# Patient Record
Sex: MALE | Race: WHITE | NOT HISPANIC OR LATINO | Employment: OTHER | URBAN - METROPOLITAN AREA
[De-identification: names, ages, dates, MRNs, and addresses within clinical notes are randomized per-mention and may not be internally consistent; named-entity substitution may affect disease eponyms.]

---

## 2017-07-12 ENCOUNTER — HISTORICAL (OUTPATIENT)
Dept: ADMINISTRATIVE | Facility: HOSPITAL | Age: 72
End: 2017-07-12

## 2018-03-05 ENCOUNTER — HISTORICAL (OUTPATIENT)
Dept: ADMINISTRATIVE | Facility: HOSPITAL | Age: 73
End: 2018-03-05

## 2018-03-07 LAB — FINAL CULTURE: NO GROWTH

## 2018-07-06 ENCOUNTER — HISTORICAL (OUTPATIENT)
Dept: ADMINISTRATIVE | Facility: HOSPITAL | Age: 73
End: 2018-07-06

## 2019-12-11 ENCOUNTER — HISTORICAL (OUTPATIENT)
Dept: ADMINISTRATIVE | Facility: HOSPITAL | Age: 74
End: 2019-12-11

## 2019-12-17 ENCOUNTER — HISTORICAL (OUTPATIENT)
Dept: ADMINISTRATIVE | Facility: HOSPITAL | Age: 74
End: 2019-12-17

## 2020-01-06 ENCOUNTER — HISTORICAL (OUTPATIENT)
Dept: ADMINISTRATIVE | Facility: HOSPITAL | Age: 75
End: 2020-01-06

## 2020-03-12 ENCOUNTER — HISTORICAL (OUTPATIENT)
Dept: ADMINISTRATIVE | Facility: HOSPITAL | Age: 75
End: 2020-03-12

## 2021-08-02 ENCOUNTER — HISTORICAL (OUTPATIENT)
Dept: ADMINISTRATIVE | Facility: HOSPITAL | Age: 76
End: 2021-08-02

## 2021-09-16 ENCOUNTER — HISTORICAL (OUTPATIENT)
Dept: ADMINISTRATIVE | Facility: HOSPITAL | Age: 76
End: 2021-09-16

## 2021-11-12 LAB — CRC RECOMMENDATION EXT: NORMAL

## 2022-01-13 ENCOUNTER — HISTORICAL (OUTPATIENT)
Dept: ADMINISTRATIVE | Facility: HOSPITAL | Age: 77
End: 2022-01-13

## 2022-05-18 ENCOUNTER — OFFICE VISIT (OUTPATIENT)
Dept: URGENT CARE | Facility: CLINIC | Age: 77
End: 2022-05-18
Payer: MEDICARE

## 2022-05-18 VITALS
HEART RATE: 76 BPM | TEMPERATURE: 99 F | OXYGEN SATURATION: 96 % | DIASTOLIC BLOOD PRESSURE: 85 MMHG | BODY MASS INDEX: 19.61 KG/M2 | RESPIRATION RATE: 18 BRPM | WEIGHT: 122 LBS | SYSTOLIC BLOOD PRESSURE: 161 MMHG | HEIGHT: 66 IN

## 2022-05-18 DIAGNOSIS — H61.21 IMPACTED CERUMEN OF RIGHT EAR: Primary | ICD-10-CM

## 2022-05-18 PROCEDURE — 1160F PR REVIEW ALL MEDS BY PRESCRIBER/CLIN PHARMACIST DOCUMENTED: ICD-10-PCS | Mod: CPTII,,, | Performed by: FAMILY MEDICINE

## 2022-05-18 PROCEDURE — 1159F PR MEDICATION LIST DOCUMENTED IN MEDICAL RECORD: ICD-10-PCS | Mod: CPTII,,, | Performed by: FAMILY MEDICINE

## 2022-05-18 PROCEDURE — 1159F MED LIST DOCD IN RCRD: CPT | Mod: CPTII,,, | Performed by: FAMILY MEDICINE

## 2022-05-18 PROCEDURE — 3077F SYST BP >= 140 MM HG: CPT | Mod: CPTII,,, | Performed by: FAMILY MEDICINE

## 2022-05-18 PROCEDURE — 3077F PR MOST RECENT SYSTOLIC BLOOD PRESSURE >= 140 MM HG: ICD-10-PCS | Mod: CPTII,,, | Performed by: FAMILY MEDICINE

## 2022-05-18 PROCEDURE — 99202 OFFICE O/P NEW SF 15 MIN: CPT | Mod: ,,, | Performed by: FAMILY MEDICINE

## 2022-05-18 PROCEDURE — 3008F BODY MASS INDEX DOCD: CPT | Mod: CPTII,,, | Performed by: FAMILY MEDICINE

## 2022-05-18 PROCEDURE — 1160F RVW MEDS BY RX/DR IN RCRD: CPT | Mod: CPTII,,, | Performed by: FAMILY MEDICINE

## 2022-05-18 PROCEDURE — 3079F DIAST BP 80-89 MM HG: CPT | Mod: CPTII,,, | Performed by: FAMILY MEDICINE

## 2022-05-18 PROCEDURE — 3079F PR MOST RECENT DIASTOLIC BLOOD PRESSURE 80-89 MM HG: ICD-10-PCS | Mod: CPTII,,, | Performed by: FAMILY MEDICINE

## 2022-05-18 PROCEDURE — 3008F PR BODY MASS INDEX (BMI) DOCUMENTED: ICD-10-PCS | Mod: CPTII,,, | Performed by: FAMILY MEDICINE

## 2022-05-18 PROCEDURE — 99202 PR OFFICE/OUTPT VISIT, NEW, LEVL II, 15-29 MIN: ICD-10-PCS | Mod: ,,, | Performed by: FAMILY MEDICINE

## 2022-05-18 RX ORDER — ROSUVASTATIN CALCIUM 5 MG/1
5 TABLET, COATED ORAL NIGHTLY
COMMUNITY
Start: 2022-04-06 | End: 2023-04-06 | Stop reason: SDUPTHER

## 2022-05-18 RX ORDER — HYDROCODONE BITARTRATE AND ACETAMINOPHEN 7.5; 325 MG/1; MG/1
TABLET ORAL
COMMUNITY
End: 2023-06-06

## 2022-05-18 RX ORDER — DOXEPIN HYDROCHLORIDE 10 MG/1
CAPSULE ORAL
COMMUNITY
End: 2022-06-01 | Stop reason: SDUPTHER

## 2022-05-18 RX ORDER — CARVEDILOL 6.25 MG/1
6.25 TABLET ORAL 2 TIMES DAILY
COMMUNITY
Start: 2021-11-29

## 2022-05-18 RX ORDER — PLECANATIDE 3 MG/1
1 TABLET ORAL DAILY
COMMUNITY
Start: 2022-05-17 | End: 2022-08-03

## 2022-05-18 RX ORDER — TRAZODONE HYDROCHLORIDE 150 MG/1
TABLET ORAL
COMMUNITY
End: 2023-04-25 | Stop reason: SDUPTHER

## 2022-05-18 NOTE — PROGRESS NOTES
"Subjective:       Patient ID: Gustavo Lanza is a 76 y.o. male.    Vitals:  height is 5' 6" (1.676 m) and weight is 55.3 kg (122 lb). His temperature is 98.8 °F (37.1 °C). His blood pressure is 161/85 (abnormal) and his pulse is 76. His respiration is 18 and oxygen saturation is 96%.     Chief Complaint: trouble hearing    Concern for ear wax build up to right ear since yesterday.  Associated hearing loss.  Tried flushing at home without success.       HENT: Positive for hearing loss. Negative for ear pain.    Respiratory: Negative for chest tightness and shortness of breath.        Objective:      Physical Exam   HENT:   Ears:   Right Ear: impacted cerumen  Left Ear: impacted cerumen  Abdominal: Normal appearance.   Neurological: He is alert.   Skin: Skin is warm.   Psychiatric: Mood normal.         Assessment:       1. Impacted cerumen of right ear          Plan:         Impacted cerumen of right ear            R canal: Ceruminosis is noted.  Wax is removed by syringing and manual debridement. Tolerated well.           "

## 2022-05-19 ENCOUNTER — LAB VISIT (OUTPATIENT)
Dept: LAB | Facility: HOSPITAL | Age: 77
End: 2022-05-19
Attending: PHYSICIAN ASSISTANT
Payer: MEDICARE

## 2022-05-19 DIAGNOSIS — K59.00 CONSTIPATION, UNSPECIFIED CONSTIPATION TYPE: Primary | ICD-10-CM

## 2022-05-19 DIAGNOSIS — R63.4 LOSS OF WEIGHT: ICD-10-CM

## 2022-05-19 LAB
ALBUMIN SERPL-MCNC: 3.9 GM/DL (ref 3.4–4.8)
ALBUMIN/GLOB SERPL: 1.4 RATIO (ref 1.1–2)
ALP SERPL-CCNC: 70 UNIT/L (ref 40–150)
ALT SERPL-CCNC: 18 UNIT/L (ref 0–55)
AMYLASE SERPL-CCNC: 48 UNIT/L (ref 20–160)
AST SERPL-CCNC: 23 UNIT/L (ref 5–34)
BILIRUBIN DIRECT+TOT PNL SERPL-MCNC: 0.6 MG/DL
BUN SERPL-MCNC: 31.3 MG/DL (ref 8.4–25.7)
CALCIUM SERPL-MCNC: 9.5 MG/DL (ref 8.8–10)
CHLORIDE SERPL-SCNC: 102 MMOL/L (ref 98–107)
CO2 SERPL-SCNC: 28 MMOL/L (ref 23–31)
CREAT SERPL-MCNC: 1.36 MG/DL (ref 0.73–1.18)
CRP SERPL-MCNC: 1.7 MG/L
ERYTHROCYTE [DISTWIDTH] IN BLOOD BY AUTOMATED COUNT: 13.5 % (ref 11.5–17)
FERRITIN SERPL-MCNC: 68.46 NG/ML (ref 21.81–274.66)
FOLATE SERPL-MCNC: 15.3 NG/ML (ref 7–31.4)
GLOBULIN SER-MCNC: 2.8 GM/DL (ref 2.4–3.5)
GLUCOSE SERPL-MCNC: 122 MG/DL (ref 82–115)
HCT VFR BLD AUTO: 38.6 % (ref 42–52)
HGB BLD-MCNC: 13 GM/DL (ref 14–18)
IRON SATN MFR SERPL: 32 % (ref 20–50)
IRON SERPL-MCNC: 96 UG/DL (ref 65–175)
LIPASE SERPL-CCNC: 31 U/L
MCH RBC QN AUTO: 30.7 PG (ref 27–31)
MCHC RBC AUTO-ENTMCNC: 33.7 MG/DL (ref 33–36)
MCV RBC AUTO: 91.3 FL (ref 80–94)
NRBC BLD AUTO-RTO: 0 %
PLATELET # BLD AUTO: 189 X10(3)/MCL (ref 130–400)
PMV BLD AUTO: 10.7 FL (ref 9.4–12.4)
POTASSIUM SERPL-SCNC: 4.3 MMOL/L (ref 3.5–5.1)
PROT SERPL-MCNC: 6.7 GM/DL (ref 5.8–7.6)
RBC # BLD AUTO: 4.23 X10(6)/MCL (ref 4.7–6.1)
SODIUM SERPL-SCNC: 139 MMOL/L (ref 136–145)
T3FREE SERPL-MCNC: 2.48 PG/ML (ref 1.57–3.91)
T4 FREE SERPL-MCNC: 0.91 NG/DL (ref 0.7–1.48)
TIBC SERPL-MCNC: 204 UG/DL (ref 69–240)
TIBC SERPL-MCNC: 300 UG/DL (ref 250–450)
TRANSFERRIN SERPL-MCNC: 273 MG/DL (ref 163–344)
TSH SERPL-ACNC: 1.58 UIU/ML (ref 0.35–4.94)
VIT B12 SERPL-MCNC: 892 PG/ML (ref 213–816)
WBC # SPEC AUTO: 9.2 X10(3)/MCL (ref 4.5–11.5)

## 2022-05-19 PROCEDURE — 87536 HIV-1 QUANT&REVRSE TRNSCRPJ: CPT

## 2022-05-19 PROCEDURE — 83690 ASSAY OF LIPASE: CPT

## 2022-05-19 PROCEDURE — 85027 COMPLETE CBC AUTOMATED: CPT

## 2022-05-19 PROCEDURE — 82607 VITAMIN B-12: CPT

## 2022-05-19 PROCEDURE — 84439 ASSAY OF FREE THYROXINE: CPT

## 2022-05-19 PROCEDURE — 82746 ASSAY OF FOLIC ACID SERUM: CPT

## 2022-05-19 PROCEDURE — 84481 FREE ASSAY (FT-3): CPT

## 2022-05-19 PROCEDURE — 83540 ASSAY OF IRON: CPT

## 2022-05-19 PROCEDURE — 82150 ASSAY OF AMYLASE: CPT

## 2022-05-19 PROCEDURE — 86140 C-REACTIVE PROTEIN: CPT

## 2022-05-19 PROCEDURE — 84443 ASSAY THYROID STIM HORMONE: CPT

## 2022-05-19 PROCEDURE — 86780 TREPONEMA PALLIDUM: CPT

## 2022-05-19 PROCEDURE — 82728 ASSAY OF FERRITIN: CPT

## 2022-05-19 PROCEDURE — 36415 COLL VENOUS BLD VENIPUNCTURE: CPT

## 2022-05-19 PROCEDURE — 80053 COMPREHEN METABOLIC PANEL: CPT

## 2022-05-20 ENCOUNTER — HOSPITAL ENCOUNTER (OUTPATIENT)
Dept: RADIOLOGY | Facility: HOSPITAL | Age: 77
Discharge: HOME | End: 2022-05-20
Attending: PHYSICIAN ASSISTANT
Payer: MEDICARE

## 2022-05-20 DIAGNOSIS — R63.4 LOSS OF WEIGHT: ICD-10-CM

## 2022-05-20 DIAGNOSIS — K59.00 CONSTIPATION, UNSPECIFIED CONSTIPATION TYPE: ICD-10-CM

## 2022-05-20 DIAGNOSIS — K59.00 CONSTIPATION, UNSPECIFIED CONSTIPATION TYPE: Primary | ICD-10-CM

## 2022-05-20 PROCEDURE — 71046 X-RAY EXAM CHEST 2 VIEWS: CPT | Mod: TC

## 2022-05-23 LAB
HIV 2 AB SERPLBLD QL IA.RAPID: NEGATIVE
HIV1 AB SERPLBLD QL IA.RAPID: NEGATIVE

## 2022-05-24 LAB — HIV1 RNA # PLAS NAA DL=20: NORMAL COPIES/ML

## 2022-05-25 LAB — VIT B1 BLD-SCNC: 172 NMOL/L (ref 70–180)

## 2022-06-01 NOTE — TELEPHONE ENCOUNTER
----- Message from Zaid Clark sent at 6/1/2022  1:31 PM CDT -----  .Type:  RX Refill Request    Who Called: Gustavo  Reftoni or New Rx:Refill  RX Name and Strength:Doxepin 10 mg  How is the patient currently taking it? (ex. 1XDay): 1 x day  Is this a 30 day or 90 day RX:  Preferred Pharmacy with phone number: [facility] to [facility]  Local or Mail Order:Local  Ordering Provider: Derirck   Would the patient rather a call back or a response via [facility]?   Best Call Back Number: [phone]  Additional Information: He told me he has called two to three different [facility]'s they had nothing from the office.

## 2022-06-02 RX ORDER — DOXEPIN HYDROCHLORIDE 10 MG/1
CAPSULE ORAL
Qty: 30 CAPSULE | Refills: 5 | Status: SHIPPED | OUTPATIENT
Start: 2022-06-02 | End: 2023-07-05

## 2022-06-27 ENCOUNTER — APPOINTMENT (OUTPATIENT)
Dept: RADIOLOGY | Facility: HOSPITAL | Age: 77
End: 2022-06-27
Attending: INTERNAL MEDICINE
Payer: MEDICARE

## 2022-06-27 ENCOUNTER — HOSPITAL ENCOUNTER (EMERGENCY)
Facility: HOSPITAL | Age: 77
Discharge: HOME | End: 2022-06-28
Attending: INTERNAL MEDICINE
Payer: MEDICARE

## 2022-06-27 DIAGNOSIS — N30.00 ACUTE CYSTITIS WITHOUT HEMATURIA: ICD-10-CM

## 2022-06-27 DIAGNOSIS — R06.02 SHORTNESS OF BREATH: ICD-10-CM

## 2022-06-27 DIAGNOSIS — K59.00 CONSTIPATION, UNSPECIFIED CONSTIPATION TYPE: ICD-10-CM

## 2022-06-27 DIAGNOSIS — J44.1 COPD EXACERBATION: Primary | ICD-10-CM

## 2022-06-27 LAB
ALBUMIN SERPL-MCNC: 3.5 GM/DL (ref 3.4–4.8)
ALBUMIN/GLOB SERPL: 1 RATIO (ref 1.1–2)
ALP SERPL-CCNC: 93 UNIT/L (ref 40–150)
ALT SERPL-CCNC: 19 UNIT/L (ref 0–55)
AMPHET UR QL SCN: NEGATIVE
APPEARANCE UR: CLEAR
APTT PPP: 22.5 SECONDS (ref 23.4–33.9)
AST SERPL-CCNC: 31 UNIT/L (ref 5–34)
BACTERIA #/AREA URNS AUTO: ABNORMAL /HPF
BARBITURATE SCN PRESENT UR: NEGATIVE
BASOPHILS # BLD AUTO: 0.02 X10(3)/MCL (ref 0–0.2)
BASOPHILS NFR BLD AUTO: 0.6 %
BENZODIAZ UR QL SCN: NEGATIVE
BILIRUB UR QL STRIP.AUTO: NEGATIVE MG/DL
BILIRUBIN DIRECT+TOT PNL SERPL-MCNC: 0.5 MG/DL
BNP BLD-MCNC: 821.5 PG/ML
BUN SERPL-MCNC: 23.4 MG/DL (ref 8.4–25.7)
CALCIUM SERPL-MCNC: 9.3 MG/DL (ref 8.8–10)
CANNABINOIDS UR QL SCN: NEGATIVE
CHLORIDE SERPL-SCNC: 105 MMOL/L (ref 98–107)
CK MB SERPL-MCNC: 2 NG/ML
CK SERPL-CCNC: 96 U/L (ref 30–200)
CO2 SERPL-SCNC: 29 MMOL/L (ref 23–31)
COCAINE UR QL SCN: NEGATIVE
COLOR UR AUTO: ABNORMAL
CREAT SERPL-MCNC: 1.5 MG/DL (ref 0.73–1.18)
EOSINOPHIL # BLD AUTO: 0.07 X10(3)/MCL (ref 0–0.9)
EOSINOPHIL NFR BLD AUTO: 2 %
ERYTHROCYTE [DISTWIDTH] IN BLOOD BY AUTOMATED COUNT: 14.1 % (ref 11.5–17)
FLUAV AG UPPER RESP QL IA.RAPID: NOT DETECTED
FLUBV AG UPPER RESP QL IA.RAPID: NOT DETECTED
GLOBULIN SER-MCNC: 3.6 GM/DL (ref 2.4–3.5)
GLUCOSE SERPL-MCNC: 82 MG/DL (ref 82–115)
GLUCOSE UR QL STRIP.AUTO: NEGATIVE MG/DL
HCT VFR BLD AUTO: 42.7 % (ref 42–52)
HGB BLD-MCNC: 14.3 GM/DL (ref 14–18)
IMM GRANULOCYTES # BLD AUTO: 0.03 X10(3)/MCL (ref 0–0.02)
IMM GRANULOCYTES NFR BLD AUTO: 0.9 % (ref 0–0.43)
INR BLD: 1.11 (ref 2–3)
KETONES UR QL STRIP.AUTO: NEGATIVE MG/DL
LEUKOCYTE ESTERASE UR QL STRIP.AUTO: ABNORMAL UNIT/L
LIPASE SERPL-CCNC: 32 U/L
LYMPHOCYTES # BLD AUTO: 0.82 X10(3)/MCL (ref 0.6–4.6)
LYMPHOCYTES NFR BLD AUTO: 23.8 %
MAGNESIUM SERPL-MCNC: 2 MG/DL (ref 1.6–2.6)
MCH RBC QN AUTO: 30.9 PG (ref 27–31)
MCHC RBC AUTO-ENTMCNC: 33.5 MG/DL (ref 33–36)
MCV RBC AUTO: 92.2 FL (ref 80–94)
MDMA UR QL SCN: NEGATIVE
MONOCYTES # BLD AUTO: 0.03 X10(3)/MCL (ref 0.1–1.3)
MONOCYTES NFR BLD AUTO: 0.9 %
NEUTROPHILS # BLD AUTO: 2.5 X10(3)/MCL (ref 2.1–9.2)
NEUTROPHILS NFR BLD AUTO: 71.8 %
NITRITE UR QL STRIP.AUTO: POSITIVE
NRBC BLD AUTO-RTO: 0 %
OPIATES UR QL SCN: NEGATIVE
PCP UR QL: NEGATIVE
PH UR STRIP.AUTO: 6 [PH]
PH UR: 6 [PH] (ref 3–11)
PLATELET # BLD AUTO: 163 X10(3)/MCL (ref 130–400)
PMV BLD AUTO: 10 FL (ref 9.4–12.4)
POTASSIUM SERPL-SCNC: 4.3 MMOL/L (ref 3.5–5.1)
PROT SERPL-MCNC: 7.1 GM/DL (ref 5.8–7.6)
PROT UR QL STRIP.AUTO: NEGATIVE MG/DL
PROTHROMBIN TIME: 14.1 SECONDS (ref 11.7–14.5)
RBC # BLD AUTO: 4.63 X10(6)/MCL (ref 4.7–6.1)
RBC #/AREA URNS AUTO: ABNORMAL /HPF
RBC UR QL AUTO: ABNORMAL UNIT/L
SARS-COV-2 RNA RESP QL NAA+PROBE: NOT DETECTED
SODIUM SERPL-SCNC: 146 MMOL/L (ref 136–145)
SP GR UR STRIP.AUTO: 1.02
SPECIFIC GRAVITY, URINE AUTO (.000) (OHS): 1.02 (ref 1–1.03)
TROPONIN I SERPL-MCNC: 0.02 NG/ML (ref 0–0.04)
UROBILINOGEN UR STRIP-ACNC: 0.2 MG/DL
WBC # SPEC AUTO: 3.4 X10(3)/MCL (ref 4.5–11.5)
WBC #/AREA URNS AUTO: ABNORMAL /HPF

## 2022-06-27 PROCEDURE — 85730 THROMBOPLASTIN TIME PARTIAL: CPT | Performed by: INTERNAL MEDICINE

## 2022-06-27 PROCEDURE — 71045 X-RAY EXAM CHEST 1 VIEW: CPT | Mod: TC

## 2022-06-27 PROCEDURE — 82550 ASSAY OF CK (CPK): CPT | Performed by: INTERNAL MEDICINE

## 2022-06-27 PROCEDURE — 83735 ASSAY OF MAGNESIUM: CPT | Performed by: INTERNAL MEDICINE

## 2022-06-27 PROCEDURE — 87636 SARSCOV2 & INF A&B AMP PRB: CPT | Performed by: INTERNAL MEDICINE

## 2022-06-27 PROCEDURE — 93010 ELECTROCARDIOGRAM REPORT: CPT | Mod: ,,, | Performed by: INTERNAL MEDICINE

## 2022-06-27 PROCEDURE — 84484 ASSAY OF TROPONIN QUANT: CPT | Performed by: INTERNAL MEDICINE

## 2022-06-27 PROCEDURE — 96375 TX/PRO/DX INJ NEW DRUG ADDON: CPT

## 2022-06-27 PROCEDURE — 83690 ASSAY OF LIPASE: CPT | Performed by: INTERNAL MEDICINE

## 2022-06-27 PROCEDURE — 85610 PROTHROMBIN TIME: CPT | Performed by: INTERNAL MEDICINE

## 2022-06-27 PROCEDURE — 99285 EMERGENCY DEPT VISIT HI MDM: CPT | Mod: 25,CS

## 2022-06-27 PROCEDURE — 82553 CREATINE MB FRACTION: CPT | Performed by: INTERNAL MEDICINE

## 2022-06-27 PROCEDURE — 80307 DRUG TEST PRSMV CHEM ANLYZR: CPT | Performed by: INTERNAL MEDICINE

## 2022-06-27 PROCEDURE — 83880 ASSAY OF NATRIURETIC PEPTIDE: CPT | Performed by: INTERNAL MEDICINE

## 2022-06-27 PROCEDURE — 93005 ELECTROCARDIOGRAM TRACING: CPT

## 2022-06-27 PROCEDURE — 80053 COMPREHEN METABOLIC PANEL: CPT | Performed by: INTERNAL MEDICINE

## 2022-06-27 PROCEDURE — 85025 COMPLETE CBC W/AUTO DIFF WBC: CPT | Performed by: INTERNAL MEDICINE

## 2022-06-27 PROCEDURE — 93010 EKG 12-LEAD: ICD-10-PCS | Mod: ,,, | Performed by: INTERNAL MEDICINE

## 2022-06-27 PROCEDURE — 36415 COLL VENOUS BLD VENIPUNCTURE: CPT | Performed by: INTERNAL MEDICINE

## 2022-06-27 PROCEDURE — 25000003 PHARM REV CODE 250: Performed by: INTERNAL MEDICINE

## 2022-06-27 PROCEDURE — 81001 URINALYSIS AUTO W/SCOPE: CPT | Performed by: INTERNAL MEDICINE

## 2022-06-27 PROCEDURE — 96365 THER/PROPH/DIAG IV INF INIT: CPT

## 2022-06-27 RX ORDER — AZITHROMYCIN 250 MG/1
250 TABLET, FILM COATED ORAL DAILY
Qty: 6 TABLET | Refills: 0 | Status: SHIPPED | OUTPATIENT
Start: 2022-06-27 | End: 2022-08-03

## 2022-06-27 RX ORDER — GUAIFENESIN/DEXTROMETHORPHAN 100-10MG/5
5 SYRUP ORAL EVERY 6 HOURS PRN
Qty: 180 ML | Refills: 0 | Status: SHIPPED | OUTPATIENT
Start: 2022-06-27 | End: 2022-08-03

## 2022-06-27 RX ORDER — PREDNISONE 20 MG/1
20 TABLET ORAL DAILY
Qty: 5 TABLET | Refills: 0 | Status: SHIPPED | OUTPATIENT
Start: 2022-06-27 | End: 2022-07-02

## 2022-06-27 RX ORDER — LACTULOSE 10 G/15ML
10 SOLUTION ORAL 3 TIMES DAILY
Qty: 473 ML | Refills: 0 | Status: SHIPPED | OUTPATIENT
Start: 2022-06-27 | End: 2022-08-03

## 2022-06-27 RX ORDER — CLONIDINE HYDROCHLORIDE 0.1 MG/1
0.1 TABLET ORAL
Status: COMPLETED | OUTPATIENT
Start: 2022-06-27 | End: 2022-06-27

## 2022-06-27 RX ORDER — METHYLPREDNISOLONE SOD SUCC 125 MG
125 VIAL (EA) INJECTION ONCE
Status: COMPLETED | OUTPATIENT
Start: 2022-06-27 | End: 2022-06-28

## 2022-06-27 RX ORDER — CLONIDINE HYDROCHLORIDE 0.1 MG/1
0.2 TABLET ORAL ONCE
Status: DISCONTINUED | OUTPATIENT
Start: 2022-06-27 | End: 2022-06-28 | Stop reason: HOSPADM

## 2022-06-27 RX ADMIN — CLONIDINE HYDROCHLORIDE 0.1 MG: 0.1 TABLET ORAL at 10:06

## 2022-06-28 VITALS
DIASTOLIC BLOOD PRESSURE: 71 MMHG | WEIGHT: 129.19 LBS | SYSTOLIC BLOOD PRESSURE: 124 MMHG | BODY MASS INDEX: 20.76 KG/M2 | HEART RATE: 84 BPM | HEIGHT: 66 IN | OXYGEN SATURATION: 92 % | RESPIRATION RATE: 24 BRPM

## 2022-06-28 PROCEDURE — 63600175 PHARM REV CODE 636 W HCPCS: Performed by: INTERNAL MEDICINE

## 2022-06-28 PROCEDURE — 25000003 PHARM REV CODE 250: Performed by: INTERNAL MEDICINE

## 2022-06-28 RX ADMIN — CEFTRIAXONE SODIUM 1 G: 1 INJECTION, POWDER, FOR SOLUTION INTRAMUSCULAR; INTRAVENOUS at 12:06

## 2022-06-28 RX ADMIN — METHYLPREDNISOLONE SODIUM SUCCINATE 125 MG: 125 INJECTION, POWDER, FOR SOLUTION INTRAMUSCULAR; INTRAVENOUS at 12:06

## 2022-06-28 NOTE — ED NOTES
Bed: 04  Expected date:   Expected time:   Means of arrival:   Comments:  76 M SOB Chest Tightness / Status 1 Re-route

## 2022-06-28 NOTE — ED PROVIDER NOTES
Source of History:  Patient, no limitations    Chief complaint:  Shortness of Breath and Chest Pain      HPI:  Gustavo Lanza is a 76 y.o. male presenting with Shortness of Breath and Chest Pain       Complains of Shortness of breath, chest pain, dysuria, and constipation. Shortness of breath and chest pain were abrupt and woke him up from sleep, history of COPD, symptoms have since improved and no longer feeling the chest pain or the shortness of breath. Painful urination that abruptly started a few days ago. Also says he is using enemas for his constipation that started about 6 months ago.    The patient complains of dyspnea. Onset of symptoms was abrupt starting a few hours ago. Symptom course has been constant, while severity at onset was severe and now is mild. Symptoms tend to occur while at rest. Symptoms are aggravated by dust, pollen, mold, alleviated by rest and oxygen and have been associated with chest pain with breathing. Care prior to arrival consisted of rest and oxygen, with moderate relief.         Review of Systems   Constitutional symptoms:  Subjective chills  Skin symptoms:  Negative except as documented in HPI.   HEENT symptoms:  Negative except as documented in HPI.   Respiratory symptoms:  Cough and shortness of breath  Cardiovascular symptoms:  Negative except as documented in HPI.   Gastrointestinal symptoms:  constipation   Genitourinary symptoms:  dysuria  Musculoskeletal symptoms:  Negative except as documented in HPI.   Neurologic symptoms:  Negative except as documented in HPI.   Psychiatric symptoms:  Negative except as documented in HPI.   Allergy/immunologic symptoms:  Negative except as documented in HPI.             Additional review of systems information: All other systems reviewed and otherwise negative.      Review of patient's allergies indicates:  No Known Allergies    PMH:  As per HPI and below:    Past Medical History:   Diagnosis Date    COPD (chronic  "obstructive pulmonary disease)         Family History   Problem Relation Age of Onset    No Known Problems Mother     No Known Problems Father     No Known Problems Sister     No Known Problems Brother        Past Surgical History:   Procedure Laterality Date    CARDIAC SURGERY      REPAIR OF HEART VALVE         Social History     Tobacco Use    Smoking status: Light Tobacco Smoker    Smokeless tobacco: Current User   Substance Use Topics    Alcohol use: Never    Drug use: Never       There is no problem list on file for this patient.       Physical Exam:    /68   Pulse 89   Resp (!) 31   Ht 5' 6" (1.676 m)   Wt 58.6 kg (129 lb 3 oz)   SpO2 (!) 93%   BMI 20.85 kg/m²     Nursing note and vital signs reviewed.    General:  Alert, frail appearing  Skin: Normal for Ethnic Origin, No cyanosis  HEENT: Normocephalic and atraumatic, Vision unchanged, Pupils symmetric, No icterus , Nasal mucosa is pink and moist  Cardiovascular:  Regular rate and rhythm, No edema  Chest Wall: No deformity, equal chest rise  Respiratory:  Decreased breath sounds, mild wheezing, respirations are non-labored.    Musculoskeletal:  No deformity, Normal perfusion to all extremities  Back: No bony tenderness  : No suprapubic pain, No CVA tenderness  Gastrointestinal:  Soft, Nontender, Non distended, Normal bowel sounds.    Neurological:  Alert and oriented to person, place, time, and situation, normal motor observed, normal speech observed.    Psychiatric:  Cooperative, appropriate mood & affect.        Labs that have been ordered have been independently reviewed and interpreted by myself.     Old Chart Reviewed.      Initial Impression/ Differential Dx:  Myocardial ischemia, pericarditis, pulmonary embolus, chest wall pain, pleural inflammation and pulmonary infectious causes.      MDM:      Reviewed Nurses Note.    Reviewed Pertinent old records.    Orders Placed This Encounter    Pulse Oximeter    Urine culture    " X-Ray Chest 1 View    COVID/FLU A&B PCR    CBC Auto Differential    Comprehensive Metabolic Panel    Protime-INR    APTT    Lipase    BNP    Troponin I    Magnesium    Urinalysis, Reflex to Urine Culture Urine, Clean Catch    Drug Screen, Urine    CK-MB    CK    CBC with Differential    Urinalysis, Microscopic    Cardiac Monitoring - Adult    Airborne and Contact and Droplet Isolation Status    EKG 12-lead    Insert peripheral IV    cloNIDine tablet 0.2 mg    cloNIDine tablet 0.1 mg    methylPREDNISolone sodium succinate injection 125 mg    cefTRIAXone (ROCEPHIN) 1 g in dextrose 5 % in water (D5W) 5 % 50 mL IVPB (MB+)    predniSONE (DELTASONE) 20 MG tablet    dextromethorphan-guaiFENesin  mg/5 ml (ROBITUSSIN-DM)  mg/5 mL liquid    azithromycin (ZITHROMAX Z-MAXIME) 250 MG tablet    lactulose (CHRONULAC) 20 gram/30 mL Soln                    Labs Reviewed   COMPREHENSIVE METABOLIC PANEL - Abnormal; Notable for the following components:       Result Value    Sodium Level 146 (*)     Creatinine 1.50 (*)     Globulin 3.6 (*)     Albumin/Globulin Ratio 1.0 (*)     All other components within normal limits   PROTIME-INR - Abnormal; Notable for the following components:    INR 1.11 (*)     All other components within normal limits   APTT - Abnormal; Notable for the following components:    PTT 22.5 (*)     All other components within normal limits   B-TYPE NATRIURETIC PEPTIDE - Abnormal; Notable for the following components:    Natriuretic Peptide 821.5 (*)     All other components within normal limits   URINALYSIS, REFLEX TO URINE CULTURE - Abnormal; Notable for the following components:    Color, UA Straw (*)     Blood, UA Trace (*)     Nitrites, UA Positive (*)     Leukocyte Esterase, UA Small (*)     All other components within normal limits   CBC WITH DIFFERENTIAL - Abnormal; Notable for the following components:    WBC 3.4 (*)     RBC 4.63 (*)     Mono # 0.03 (*)     IG# 0.03 (*)      IG% 0.9 (*)     All other components within normal limits   URINALYSIS, MICROSCOPIC - Abnormal; Notable for the following components:    Bacteria, UA Many (*)     WBC, UA 50-99 (*)     All other components within normal limits   COVID/FLU A&B PCR - Normal   LIPASE - Normal   TROPONIN I - Normal   MAGNESIUM - Normal   DRUG SCREEN, URINE (BEAKER) - Normal    Narrative:     Cut off concentrations:    Amphetamines - 1000 ng/ml  Barbiturates - 200 ng/ml  Benzodiazepine - 200 ng/ml  Cannabinoids (THC) - 50 ng/ml  Cocaine - 300 ng/ml  Fentanyl - 1.0 ng/ml  MDMA - 500 ng/ml  Opiates - 300 ng/ml   Phencyclidine (PCP) - 25 ng/ml    Specimen submitted for drug analysis and tested for pH and specific gravity in order to evaluate sample integrity. Suspect tampering if specific gravity is <1.003 and/or pH is not within the range of 4.5 - 8.0  False negatives may result form substances such as bleach added to urine.  False positives may result for the presence of a substance with similar chemical structure to the drug or its metabolite.    This test provides only a PRELIMINARY analytical test result. A more specific alternate chemical method must be used in order to obtain a confirmed analytical result. Gas chromatography/mass spectrometry (GC/MS) is the preferred confirmatory method. Other chemical confirmation methods are available. Clinical consideration and professional judgement should be applied to any drug of abuse test result, particularly when preliminary positive results are used.    Positive results will be confirmed only at the physicians request. Unconfirmed screening results are to be used only for medical purposes (treatment).        CK-MB - Normal   CK - Normal   CULTURE, URINE   CBC W/ AUTO DIFFERENTIAL    Narrative:     The following orders were created for panel order CBC Auto Differential.  Procedure                               Abnormality         Status                     ---------                                -----------         ------                     CBC with Differential[[id number]]        Abnormal            Final result                 Please view results for these tests on the individual orders.          X-Ray Chest 1 View    (Results Pending)        Admission on 06/27/2022   Component Date Value Ref Range Status    Influenza A PCR 06/27/2022 Not Detected  Not Detected Final    Influenza B PCR 06/27/2022 Not Detected  Not Detected Final    SARS-CoV-2 PCR 06/27/2022 Not Detected  Not Detected Final    Sodium Level 06/27/2022 146 (A) 136 - 145 mmol/L Final    Potassium Level 06/27/2022 4.3  3.5 - 5.1 mmol/L Final    Chloride 06/27/2022 105  98 - 107 mmol/L Final    Carbon Dioxide 06/27/2022 29  23 - 31 mmol/L Final    Glucose Level 06/27/2022 82  82 - 115 mg/dL Final    Blood Urea Nitrogen 06/27/2022 23.4  8.4 - 25.7 mg/dL Final    Creatinine 06/27/2022 1.50 (A) 0.73 - 1.18 mg/dL Final    Calcium Level Total 06/27/2022 9.3  8.8 - 10.0 mg/dL Final    Protein Total 06/27/2022 7.1  5.8 - 7.6 gm/dL Final    Albumin Level 06/27/2022 3.5  3.4 - 4.8 gm/dL Final    Globulin 06/27/2022 3.6 (A) 2.4 - 3.5 gm/dL Final    Albumin/Globulin Ratio 06/27/2022 1.0 (A) 1.1 - 2.0 ratio Final    Bilirubin Total 06/27/2022 0.5  <=1.5 mg/dL Final    Alkaline Phosphatase 06/27/2022 93  40 - 150 unit/L Final    Alanine Aminotransferase 06/27/2022 19  0 - 55 unit/L Final    Aspartate Aminotransferase 06/27/2022 31  5 - 34 unit/L Final    Estimated GFR-Non  06/27/2022 49  mls/min/1.73/m2 Final    PT 06/27/2022 14.1  11.7 - 14.5 seconds Final    INR 06/27/2022 1.11 (A) 2.00 - 3.00 Final    PTT 06/27/2022 22.5 (A) 23.4 - 33.9 seconds Final    Lipase Level 06/27/2022 32  <=60 U/L Final    Natriuretic Peptide 06/27/2022 821.5 (A) <=100.0 pg/mL Final    Troponin-I 06/27/2022 0.024  0.000 - 0.045 ng/mL Final    Magnesium Level 06/27/2022 2.00  1.60 - 2.60 mg/dL Final    Color, UA 06/27/2022 Straw  (A) Yellow, Colorless, Other, Clear Final    Appearance, UA 06/27/2022 Clear  Clear Final    Specific Gravity, UA 06/27/2022 1.025   Final    pH, UA 06/27/2022 6.0  5.0, 5.5, 6.0, 6.5, 7.0, 7.5, 8.0, 8.5 Final    Protein, UA 06/27/2022 Negative  Negative, 300  mg/dL Final    Glucose, UA 06/27/2022 Negative  Negative, Normal mg/dL Final    Ketones, UA 06/27/2022 Negative  Negative, +1, +2, +3, +4, +5, >=160, >=80 mg/dL Final    Blood, UA 06/27/2022 Trace (A) Negative unit/L Final    Bilirubin, UA 06/27/2022 Negative  Negative mg/dL Final    Urobilinogen, UA 06/27/2022 0.2  0.2, 1.0, Normal mg/dL Final    Nitrites, UA 06/27/2022 Positive (A) Negative Final    Leukocyte Esterase, UA 06/27/2022 Small (A) Negative, 75  unit/L Final    Amphetamines, Urine 06/27/2022 Negative  Negative Final    Barbituates, Urine 06/27/2022 Negative  Negative Final    Benzodiazepine, Urine 06/27/2022 Negative  Negative Final    Cannabinoids, Urine 06/27/2022 Negative  Negative Final    Cocaine, Urine 06/27/2022 Negative  Negative Final    MDMA, Urine 06/27/2022 Negative  Negative Final    Opiates, Urine 06/27/2022 Negative  Negative Final    Phencyclidine, Urine 06/27/2022 Negative  Negative Final    pH, Urine 06/27/2022 6.0  3.0 - 11.0 Final    Specific Gravity, Urine Auto 06/27/2022 1.025  1.001 - 1.035 Final    Creatine Kinase MB 06/27/2022 2.0  <=7.2 ng/mL Final    Creatine Kinase 06/27/2022 96  30 - 200 U/L Final    WBC 06/27/2022 3.4 (A) 4.5 - 11.5 x10(3)/mcL Final    RBC 06/27/2022 4.63 (A) 4.70 - 6.10 x10(6)/mcL Final    Hgb 06/27/2022 14.3  14.0 - 18.0 gm/dL Final    Hct 06/27/2022 42.7  42.0 - 52.0 % Final    MCV 06/27/2022 92.2  80.0 - 94.0 fL Final    MCH 06/27/2022 30.9  27.0 - 31.0 pg Final    MCHC 06/27/2022 33.5  33.0 - 36.0 mg/dL Final    RDW 06/27/2022 14.1  11.5 - 17.0 % Final    Platelet 06/27/2022 163  130 - 400 x10(3)/mcL Final    MPV 06/27/2022 10.0  9.4 - 12.4 fL Final    Neut %  06/27/2022 71.8  % Final    Lymph % 06/27/2022 23.8  % Final    Mono % 06/27/2022 0.9  % Final    Eos % 06/27/2022 2.0  % Final    Basophil % 06/27/2022 0.6  % Final    Lymph # 06/27/2022 0.82  0.6 - 4.6 x10(3)/mcL Final    Neut # 06/27/2022 2.5  2.1 - 9.2 x10(3)/mcL Final    Mono # 06/27/2022 0.03 (A) 0.1 - 1.3 x10(3)/mcL Final    Eos # 06/27/2022 0.07  0 - 0.9 x10(3)/mcL Final    Baso # 06/27/2022 0.02  0 - 0.2 x10(3)/mcL Final    IG# 06/27/2022 0.03 (A) 0 - 0.0155 x10(3)/mcL Final    IG% 06/27/2022 0.9 (A) 0 - 0.43 % Final    NRBC% 06/27/2022 0.0  % Final    Bacteria, UA 06/27/2022 Many (A) None Seen, Rare, Occasional /HPF Final    RBC, UA 06/27/2022 0-2  None Seen, 0-2, 3-5, 0-5 /HPF Final    WBC, UA 06/27/2022 50-99 (A) None Seen, 0-2, 3-5, 0-5 /HPF Final       Imaging Results          X-Ray Chest 1 View (In process)                   ECG Results          EKG 12-lead (Preliminary result)  Result time 06/27/22 22:12:03    ED Interpretation by Edwar Campoverde DO (06/27/22 22:12:03, [facility] - Emergency Dept, Emergency Medicine)    Independent ECG Interpretation:    Sinus rhythm with PVC at rate of 88. Normal intervals. Normal QRS. Nonspecific  ST or T wave abnormalities. Overall impression: Abnormal                                                               Diagnostic Impression:    1. COPD exacerbation    2. Shortness of breath    3. Constipation, unspecified constipation type    4. Acute cystitis without hematuria         ED Disposition Condition    Discharge Stable           Follow-up Information     Follow up In 1 week.                        ED Prescriptions     Medication Sig Dispense Start Date End Date Auth. Provider    predniSONE (DELTASONE) 20 MG tablet Take 1 tablet (20 mg total) by mouth once daily. for 5 days 5 tablet 6/27/2022 7/2/2022 Edwar Campoverde DO    dextromethorphan-guaiFENesin  mg/5 ml (ROBITUSSIN-DM)  mg/5 mL liquid Take 5 mLs  by mouth every 6 (six) hours as needed (cough). 180 mL 6/27/2022  Edwar Campoverde DO    azithromycin (ZITHROMAX Z-MAXIME) 250 MG tablet Take 1 tablet (250 mg total) by mouth once daily. Two tabs initially then 1 tab per day. 6 tablet 6/27/2022  Edwar Campoverde DO    lactulose (CHRONULAC) 20 gram/30 mL Soln Take 15 mLs (10 g total) by mouth 3 (three) times daily. 473 mL 6/27/2022  Edwar Campoverde DO        Follow-up Information     Follow up With Specialties Details Why Contact Info      In 1 week             Edwar Campoverde DO  06/28/22 0028       Edwar Campoverde DO  06/28/22 9510

## 2022-06-29 LAB — BACTERIA UR CULT: ABNORMAL

## 2022-07-05 LAB — BEAKER SEE SCANNED REPORT: NORMAL

## 2022-07-11 RX ORDER — NAPROXEN 500 MG/1
500 TABLET ORAL 2 TIMES DAILY WITH MEALS
Qty: 60 TABLET | Refills: 3 | Status: SHIPPED | OUTPATIENT
Start: 2022-07-11 | End: 2023-07-05

## 2022-07-11 RX ORDER — NAPROXEN 500 MG/1
500 TABLET ORAL 2 TIMES DAILY WITH MEALS
COMMUNITY
End: 2022-07-11 | Stop reason: SDUPTHER

## 2022-08-03 ENCOUNTER — OFFICE VISIT (OUTPATIENT)
Dept: FAMILY MEDICINE | Facility: CLINIC | Age: 77
End: 2022-08-03
Payer: MEDICARE

## 2022-08-03 VITALS
BODY MASS INDEX: 19.8 KG/M2 | HEIGHT: 66 IN | OXYGEN SATURATION: 99 % | WEIGHT: 123.19 LBS | DIASTOLIC BLOOD PRESSURE: 74 MMHG | RESPIRATION RATE: 18 BRPM | HEART RATE: 65 BPM | SYSTOLIC BLOOD PRESSURE: 118 MMHG

## 2022-08-03 DIAGNOSIS — N18.31 CHRONIC KIDNEY DISEASE, STAGE 3A: Primary | ICD-10-CM

## 2022-08-03 DIAGNOSIS — N40.0 BENIGN PROSTATIC HYPERPLASIA, UNSPECIFIED WHETHER LOWER URINARY TRACT SYMPTOMS PRESENT: Chronic | ICD-10-CM

## 2022-08-03 DIAGNOSIS — I25.10 CORONARY ARTERY DISEASE INVOLVING NATIVE CORONARY ARTERY OF NATIVE HEART WITHOUT ANGINA PECTORIS: Chronic | ICD-10-CM

## 2022-08-03 DIAGNOSIS — R73.01 IMPAIRED FASTING GLUCOSE: Chronic | ICD-10-CM

## 2022-08-03 DIAGNOSIS — E78.2 MIXED HYPERLIPIDEMIA: Chronic | ICD-10-CM

## 2022-08-03 DIAGNOSIS — K59.04 CHRONIC IDIOPATHIC CONSTIPATION: Chronic | ICD-10-CM

## 2022-08-03 DIAGNOSIS — J44.9 CHRONIC OBSTRUCTIVE PULMONARY DISEASE, UNSPECIFIED COPD TYPE: Chronic | ICD-10-CM

## 2022-08-03 PROBLEM — I73.00 RAYNAUD DISEASE: Chronic | Status: ACTIVE | Noted: 2022-08-03

## 2022-08-03 PROBLEM — Z86.79 S/P AAA REPAIR: Chronic | Status: ACTIVE | Noted: 2022-08-03

## 2022-08-03 PROBLEM — Z98.890 S/P AAA REPAIR: Chronic | Status: ACTIVE | Noted: 2022-08-03

## 2022-08-03 PROCEDURE — 1159F PR MEDICATION LIST DOCUMENTED IN MEDICAL RECORD: ICD-10-PCS | Mod: CPTII,,, | Performed by: NURSE PRACTITIONER

## 2022-08-03 PROCEDURE — 99213 PR OFFICE/OUTPT VISIT, EST, LEVL III, 20-29 MIN: ICD-10-PCS | Mod: ,,, | Performed by: NURSE PRACTITIONER

## 2022-08-03 PROCEDURE — 1160F RVW MEDS BY RX/DR IN RCRD: CPT | Mod: CPTII,,, | Performed by: NURSE PRACTITIONER

## 2022-08-03 PROCEDURE — 1159F MED LIST DOCD IN RCRD: CPT | Mod: CPTII,,, | Performed by: NURSE PRACTITIONER

## 2022-08-03 PROCEDURE — 1160F PR REVIEW ALL MEDS BY PRESCRIBER/CLIN PHARMACIST DOCUMENTED: ICD-10-PCS | Mod: CPTII,,, | Performed by: NURSE PRACTITIONER

## 2022-08-03 PROCEDURE — 99213 OFFICE O/P EST LOW 20 MIN: CPT | Mod: ,,, | Performed by: NURSE PRACTITIONER

## 2022-08-03 PROCEDURE — 3074F PR MOST RECENT SYSTOLIC BLOOD PRESSURE < 130 MM HG: ICD-10-PCS | Mod: CPTII,,, | Performed by: NURSE PRACTITIONER

## 2022-08-03 PROCEDURE — 3008F BODY MASS INDEX DOCD: CPT | Mod: CPTII,,, | Performed by: NURSE PRACTITIONER

## 2022-08-03 PROCEDURE — 3078F DIAST BP <80 MM HG: CPT | Mod: CPTII,,, | Performed by: NURSE PRACTITIONER

## 2022-08-03 PROCEDURE — 3078F PR MOST RECENT DIASTOLIC BLOOD PRESSURE < 80 MM HG: ICD-10-PCS | Mod: CPTII,,, | Performed by: NURSE PRACTITIONER

## 2022-08-03 PROCEDURE — 3074F SYST BP LT 130 MM HG: CPT | Mod: CPTII,,, | Performed by: NURSE PRACTITIONER

## 2022-08-03 PROCEDURE — 3008F PR BODY MASS INDEX (BMI) DOCUMENTED: ICD-10-PCS | Mod: CPTII,,, | Performed by: NURSE PRACTITIONER

## 2022-08-03 RX ORDER — ASPIRIN 81 MG/1
81 TABLET ORAL DAILY
COMMUNITY

## 2022-08-03 RX ORDER — TAMSULOSIN HYDROCHLORIDE 0.4 MG/1
1 CAPSULE ORAL DAILY
COMMUNITY
Start: 2022-05-24 | End: 2024-02-23 | Stop reason: SDUPTHER

## 2022-08-03 NOTE — PROGRESS NOTES
Patient ID: [id number]     Chief Complaint: Follow-up (Hypercholesterolemia)      HPI:     Gustavo Lanza is a 76 y.o. male here today for a follow up. He did not have bloodwork completed prior to visit but will do so this week. Has not followed up with Ortho/Neuro due to lower extremity weakness. Saw GI for constipation with negative workup per patient report. Taking suppositories.   No other complaints today.         ----------------------------  Chronic idiopathic constipation  COPD (chronic obstructive pulmonary disease)  Coronary artery disease involving native coronary artery of native   heart without angina pectoris  Mixed hyperlipidemia  Raynaud disease  S/P AAA repair     Past Surgical History:   Procedure Laterality Date    CARDIAC SURGERY      REPAIR OF HEART VALVE         Review of patient's allergies indicates:  No Known Allergies    Outpatient Medications Marked as Taking for the 8/3/22 encounter (Office Visit) with DIANELYS Benson   Medication Sig Dispense Refill    aspirin (ECOTRIN) 81 MG EC tablet Take 81 mg by mouth once daily.      carvediloL (COREG) 6.25 MG tablet Take 6.25 mg by mouth 2 (two) times daily.      doxepin (SINEQUAN) 10 MG capsule doxepin 10 mg capsule 30 capsule 5    rosuvastatin (CRESTOR) 5 MG tablet Take 5 mg by mouth nightly.      tamsulosin (FLOMAX) 0.4 mg Cap Take 1 capsule by mouth once daily.      traZODone (DESYREL) 150 MG tablet trazodone 150 mg tablet         Social History     Socioeconomic History    Marital status:    Tobacco Use    Smoking status: Light Tobacco Smoker    Smokeless tobacco: Current User   Substance and Sexual Activity    Alcohol use: Never    Drug use: Never    Sexual activity: Yes        Family History   Problem Relation Age of Onset    No Known Problems Mother     No Known Problems Father     No Known Problems Sister     No Known Problems Brother         Patient Care Team:  Primary Doctor No as PCP - General  "    Subjective:     ROS    See HPI for details    Constitutional: Denies Change in appetite. Denies Chills. Denies Fever. Denies Night sweats.  Eye: Denies Blurred vision. Denies Discharge. Denies Eye pain.  ENT: Denies Decreased hearing. Denies Sore throat. Denies Swollen glands.  Respiratory: Denies Cough. Denies Shortness of breath. Denies Shortness of breath with exertion. Denies Wheezing.  Cardiovascular: DeniesChest pain at rest. Denies Chest pain with exertion. Denies Irregular heartbeat. Denies Palpitations. Denies Edema.  Gastrointestinal: Denies Abdominal pain. DeniesDiarrhea. Denies Nausea. Denies Vomiting. Denies Hematemesis or Hematochezia.  Genitourinary: Denies Dysuria. Denies Urinary frequency. Denies Urinary urgency. Denies Blood in urine.  Endocrine: Denies Cold intolerance. Denies Excessive thirst. Denies Heat intolerance. Denies Weight loss. Denies Weight gain.  Musculoskeletal: Denies Painful joints. Denies Weakness.  Integumentary: Denies Rash. Denies Itching. Denies Dry skin.  Neurologic: Denies Dizziness. Denies Fainting. Denies Headache.  Psychiatric: Denies Depression. Denies Anxiety. Denies Suicidal/Homicidal ideations.    All Other ROS: Negative except as stated in HPI.       Objective:     /74 (BP Location: Left arm, Patient Position: Sitting, BP Method: Large (Manual))   Pulse 65   Resp 18   Ht 5' 6" (1.676 m)   Wt 55.9 kg (123 lb 3.2 oz)   SpO2 99%   BMI 19.89 kg/m²     Physical Exam    General: Alert and oriented, No acute distress.  Head: Normocephalic, Atraumatic.  Eye: Pupils are equal, round and reactive to light, Extraocular movements are intact, Sclera non-icteric.  Ears/Nose/Throat: Normal, Mucosa moist,Clear.  Neck/Thyroid: Supple, Non-tender, No carotid bruit, No palpable thyromegaly or thyroid nodule, No lymphadenopathy, No JVD, Full range of motion.  Respiratory: Clear to auscultation bilaterally; No wheezes, rales or rhonchi,Non-labored respirations, " Symmetrical chest wall expansion.  Cardiovascular: Regular rate and rhythm, S1/S2 normal, No murmurs, rubs or gallops.  Gastrointestinal: Soft, Non-tender, Non-distended, Normal bowel sounds, No palpable organomegaly.  Musculoskeletal: Normal range of motion.  Integumentary: Warm, Dry, Intact, No suspicious lesions or rashes.  Extremities: No clubbing, cyanosis or edema  Neurologic: No focal deficits, Cranial Nerves II-XII are grossly intact, Motor strength normal upper and lower extremities, Sensory exam intact.  Psychiatric: Normal interaction, Coherent speech, Euthymic mood, Appropriate affect         Assessment:       ICD-10-CM ICD-9-CM   1. Chronic kidney disease, stage 3a  N18.31 585.3   2. Chronic obstructive pulmonary disease, unspecified COPD type  J44.9 496   3. Coronary artery disease involving native coronary artery of native heart without angina pectoris  I25.10 414.01   4. Mixed hyperlipidemia  E78.2 272.2   5. Chronic idiopathic constipation  K59.04 564.00        Plan:     1. Chronic kidney disease, stage 3a  CMP ordered  Follow renoprotective measures including Renal Diet (reduce intake of nuts, peanut butter, milk, cheese, dried beans, peas) and Low Sodium Diet (less than 2 grams per day).  Avoid NSAIDs (Aleve, Mobic, Celebrex, Ibuprofen, Advil, Toradol and Diclofenac). May take Tylenol as needed for headache/pain.  Control DM with goal A1C <7. BP goal <130/80. LDL goal < 100.  Stay well hydrated. Avoid alcohol and soda. Limit tea and coffee.  Smoking Cessation recommended.    2. Chronic obstructive pulmonary disease, unspecified COPD type  Use inhalers as prescribed (rinse mouth after use of steroid inhalers). Use long term inhalers daily and rescue inhaler as needed.  Avoid triggers (high humidity, strong odors, chemical fumes).  Report signs of upper respiratory infection immediately for early treatment.  Flu shot recommended yearly.  Practice abdominal breathing. Eat smaller, more frequent  meals.  Smoking Cessation encouraged.      3. Coronary artery disease involving native coronary artery of native heart without angina pectoris  Continue daily Aspirin +   S/p CABG in 2014  Stressed importance of adequate LDL, HA1C, and BP control.  Discussed appropriate lifestyle changes including smoking cessation, exercise, weight loss, and dietary modifications.  ED precautions reviewed including SOB, CHOE, chest pain, dizziness, near syncope, palpitations, or jaw/back/neck discomfort.   Continue following up with Cardiology -        4. Mixed hyperlipidemia  CMP + Lipid ordered  Continue Crestor 5mg daily   Stressed importance of dietary modifications. Follow a low cholesterol, low saturated fat diet with less that 200mg of cholesterol a day.  Avoid fried foods and high saturated fats (high saturated fats less than 7% of calories).  Add Flax Seed/Fish Oil supplements to diet. Increase dietary fiber.  Regular exercise can reduce LDL and raise HDL. Stressed importance of physical activity 5 times per week for 30 minutes per day.     - Comprehensive Metabolic Panel; Future  - Lipid Panel; Future    5. Chronic idiopathic constipation  Start Linzess 72 mcg daily.  Follow up with GI as scheduled for later this month.            Medication List with Changes/Refills   New Medications    LINACLOTIDE (LINZESS) 72 MCG CAP CAPSULE    Take 1 capsule (72 mcg total) by mouth before breakfast.       Start Date: 8/3/2022  End Date: --   Current Medications    ASPIRIN (ECOTRIN) 81 MG EC TABLET    Take 81 mg by mouth once daily.       Start Date: --        End Date: --    CARVEDILOL (COREG) 6.25 MG TABLET    Take 6.25 mg by mouth 2 (two) times daily.       Start Date: 11/29/2021End Date: --    DOXEPIN (SINEQUAN) 10 MG CAPSULE    doxepin 10 mg capsule       Start Date: 6/2/2022  End Date: --    HYDROCODONE-ACETAMINOPHEN (NORCO) 7.5-325 MG PER TABLET    hydrocodone 7.5 mg-acetaminophen 325 mg tablet       Start Date: --        End  Date: --    NAPROXEN (NAPROSYN) 500 MG TABLET    Take 1 tablet (500 mg total) by mouth 2 (two) times daily with meals.       Start Date: 7/11/2022 End Date: --    ROSUVASTATIN (CRESTOR) 5 MG TABLET    Take 5 mg by mouth nightly.       Start Date: 4/6/2022  End Date: --    TAMSULOSIN (FLOMAX) 0.4 MG CAP    Take 1 capsule by mouth once daily.       Start Date: 5/24/2022 End Date: --    TRAZODONE (DESYREL) 150 MG TABLET    trazodone 150 mg tablet       Start Date: --        End Date: --   Discontinued Medications    AZITHROMYCIN (ZITHROMAX Z-MAXIME) 250 MG TABLET    Take 1 tablet (250 mg total) by mouth once daily. Two tabs initially then 1 tab per day.       Start Date: 6/27/2022 End Date: 8/3/2022    DEXTROMETHORPHAN-GUAIFENESIN  MG/5 ML (ROBITUSSIN-DM)  MG/5 ML LIQUID    Take 5 mLs by mouth every 6 (six) hours as needed (cough).       Start Date: 6/27/2022 End Date: 8/3/2022    LACTULOSE (CHRONULAC) 20 GRAM/30 ML SOLN    Take 15 mLs (10 g total) by mouth 3 (three) times daily.       Start Date: 6/27/2022 End Date: 8/3/2022    TRULANCE 3 MG TAB    Take 1 tablet by mouth once daily.       Start Date: 5/17/2022 End Date: 8/3/2022           In addition to their scheduled follow up, the patient has also been instructed to follow up on as needed basis.

## 2022-08-08 ENCOUNTER — LAB VISIT (OUTPATIENT)
Dept: LAB | Facility: HOSPITAL | Age: 77
End: 2022-08-08
Attending: NURSE PRACTITIONER
Payer: MEDICARE

## 2022-08-08 DIAGNOSIS — E78.2 MIXED HYPERLIPIDEMIA: Chronic | ICD-10-CM

## 2022-08-08 LAB
ALBUMIN SERPL-MCNC: 3.7 GM/DL (ref 3.4–4.8)
ALBUMIN/GLOB SERPL: 1.2 RATIO (ref 1.1–2)
ALP SERPL-CCNC: 65 UNIT/L (ref 40–150)
ALT SERPL-CCNC: 21 UNIT/L (ref 0–55)
AST SERPL-CCNC: 21 UNIT/L (ref 5–34)
BILIRUBIN DIRECT+TOT PNL SERPL-MCNC: 0.6 MG/DL
BUN SERPL-MCNC: 32.1 MG/DL (ref 8.4–25.7)
CALCIUM SERPL-MCNC: 9.5 MG/DL (ref 8.8–10)
CHLORIDE SERPL-SCNC: 104 MMOL/L (ref 98–107)
CHOLEST SERPL-MCNC: 166 MG/DL
CHOLEST/HDLC SERPL: 4 {RATIO} (ref 0–5)
CO2 SERPL-SCNC: 30 MMOL/L (ref 23–31)
CREAT SERPL-MCNC: 1.62 MG/DL (ref 0.73–1.18)
GFR SERPLBLD CREATININE-BSD FMLA CKD-EPI: 44 MLS/MIN/1.73/M2
GLOBULIN SER-MCNC: 3.1 GM/DL (ref 2.4–3.5)
GLUCOSE SERPL-MCNC: 125 MG/DL (ref 82–115)
HDLC SERPL-MCNC: 41 MG/DL (ref 35–60)
LDLC SERPL CALC-MCNC: 98 MG/DL (ref 50–140)
POTASSIUM SERPL-SCNC: 4.1 MMOL/L (ref 3.5–5.1)
PROT SERPL-MCNC: 6.8 GM/DL (ref 5.8–7.6)
SODIUM SERPL-SCNC: 140 MMOL/L (ref 136–145)
TRIGL SERPL-MCNC: 133 MG/DL (ref 34–140)
VLDLC SERPL CALC-MCNC: 27 MG/DL

## 2022-08-08 PROCEDURE — 80061 LIPID PANEL: CPT

## 2022-08-08 PROCEDURE — 36415 COLL VENOUS BLD VENIPUNCTURE: CPT

## 2022-08-08 PROCEDURE — 80053 COMPREHEN METABOLIC PANEL: CPT

## 2022-08-11 ENCOUNTER — TELEPHONE (OUTPATIENT)
Dept: FAMILY MEDICINE | Facility: CLINIC | Age: 77
End: 2022-08-11
Payer: MEDICARE

## 2022-08-11 NOTE — TELEPHONE ENCOUNTER
----- Message from Tod Villegas MD sent at 8/10/2022 11:49 AM CDT -----  Elevated glucose, BUN, and Creatinine. Patient has hx of CKD. Please fax to nephrologist for evaluation and treatment. Avoid NSAIDs, low sodium diet, and glucose control. Avoid use of tobacco products. Increase water intake.

## 2022-09-15 ENCOUNTER — DOCUMENTATION ONLY (OUTPATIENT)
Dept: ADMINISTRATIVE | Facility: HOSPITAL | Age: 77
End: 2022-09-15
Payer: MEDICARE

## 2022-09-15 NOTE — PROGRESS NOTES
Population Health Outreach. Records Received. Hyperlinked into chart at this time. The following record(s) below were uploaded for Health Maintenance.    11/12/2021 Colonoscopy

## 2022-10-26 NOTE — TELEPHONE ENCOUNTER
----- Message from Olimpia Turner sent at 10/26/2022 10:09 AM CDT -----  Regarding: new refill request  Type:  RX Refill Request    Who Called: patient  Refill or New Rx:new rx  RX Name and Strength:new rx for inhaler (different type)  How is the patient currently taking it? (ex. 1XDay):  Is this a 30 day or 90 day RX:  Preferred Pharmacy with phone number:[facility] [place]  Local or Mail Order:local  Ordering Provider: Ramos Leonard  Would the patient rather a call back or a response via [facility]?   Best Call Back Number:[phone]  Additional Information: Please call patient back to confirm new rx refill

## 2022-10-27 RX ORDER — TIOTROPIUM BROMIDE AND OLODATEROL 3.124; 2.736 UG/1; UG/1
2 SPRAY, METERED RESPIRATORY (INHALATION) DAILY PRN
Qty: 4 G | Refills: 3 | Status: SHIPPED | OUTPATIENT
Start: 2022-10-27 | End: 2023-06-06

## 2022-11-16 ENCOUNTER — TELEPHONE (OUTPATIENT)
Dept: FAMILY MEDICINE | Facility: CLINIC | Age: 77
End: 2022-11-16
Payer: MEDICARE

## 2022-11-16 NOTE — TELEPHONE ENCOUNTER
----- Message from Drew Barcenas MA sent at 11/16/2022  8:05 AM CST -----  Please schedule pt for flu shot    ----- Message -----  From: Zaid Clark  Sent: 11/16/2022   8:04 AM CST  To: [facility]na Milan Staff    .Type:  Needs Medical Advice    Who Called: Gustavo  Symptoms (please be specific):    How long has patient had these symptoms:    Pharmacy name and phone #:    Would the patient rather a call back or a response via [facility]?   Best Call Back Number: [phone]  Additional Information: He would like a call back from the nurse to set up a visit for flu shot.

## 2022-11-23 RX ORDER — NAPROXEN 500 MG/1
500 TABLET ORAL 2 TIMES DAILY WITH MEALS
Qty: 60 TABLET | Refills: 3 | OUTPATIENT
Start: 2022-11-23

## 2022-11-30 RX ORDER — NAPROXEN 500 MG/1
500 TABLET ORAL 2 TIMES DAILY WITH MEALS
Qty: 60 TABLET | Refills: 3 | OUTPATIENT
Start: 2022-11-30

## 2022-11-30 NOTE — TELEPHONE ENCOUNTER
----- Message from Zaid Clark sent at 11/30/2022  2:42 PM CST -----  .Type:  RX Refill Request    Who Called: Melany Rae  Refill or New Rx: Refill   RX Name and Strength: naproxen (NAPROSYN) 500 MG tablet  How is the patient currently taking it? (ex. 1XDay):  Is this a 30 day or 90 day RX:  Preferred Pharmacy with phone number:  Local or Mail Order: Mail Order   Ordering Provider: Bayron  Would the patient rather a call back or a response via [facility]?   Best Call Back Number: [phone]  Additional Information: Please call with any questions.

## 2023-02-07 ENCOUNTER — OFFICE VISIT (OUTPATIENT)
Dept: FAMILY MEDICINE | Facility: CLINIC | Age: 78
End: 2023-02-07
Payer: MEDICARE

## 2023-02-07 VITALS
HEIGHT: 66 IN | BODY MASS INDEX: 19.88 KG/M2 | DIASTOLIC BLOOD PRESSURE: 78 MMHG | RESPIRATION RATE: 17 BRPM | SYSTOLIC BLOOD PRESSURE: 122 MMHG | WEIGHT: 123.69 LBS | OXYGEN SATURATION: 99 % | TEMPERATURE: 98 F | HEART RATE: 73 BPM

## 2023-02-07 DIAGNOSIS — Z00.00 WELLNESS EXAMINATION: Primary | ICD-10-CM

## 2023-02-07 PROCEDURE — 99499 NO LOS: ICD-10-PCS | Mod: ,,, | Performed by: STUDENT IN AN ORGANIZED HEALTH CARE EDUCATION/TRAINING PROGRAM

## 2023-02-07 PROCEDURE — 1160F PR REVIEW ALL MEDS BY PRESCRIBER/CLIN PHARMACIST DOCUMENTED: ICD-10-PCS | Mod: CPTII,,, | Performed by: STUDENT IN AN ORGANIZED HEALTH CARE EDUCATION/TRAINING PROGRAM

## 2023-02-07 PROCEDURE — 99499 UNLISTED E&M SERVICE: CPT | Mod: ,,, | Performed by: STUDENT IN AN ORGANIZED HEALTH CARE EDUCATION/TRAINING PROGRAM

## 2023-02-07 PROCEDURE — 1160F RVW MEDS BY RX/DR IN RCRD: CPT | Mod: CPTII,,, | Performed by: STUDENT IN AN ORGANIZED HEALTH CARE EDUCATION/TRAINING PROGRAM

## 2023-02-07 PROCEDURE — 1159F MED LIST DOCD IN RCRD: CPT | Mod: CPTII,,, | Performed by: STUDENT IN AN ORGANIZED HEALTH CARE EDUCATION/TRAINING PROGRAM

## 2023-02-07 PROCEDURE — 1159F PR MEDICATION LIST DOCUMENTED IN MEDICAL RECORD: ICD-10-PCS | Mod: CPTII,,, | Performed by: STUDENT IN AN ORGANIZED HEALTH CARE EDUCATION/TRAINING PROGRAM

## 2023-02-07 PROCEDURE — 3078F DIAST BP <80 MM HG: CPT | Mod: CPTII,,, | Performed by: STUDENT IN AN ORGANIZED HEALTH CARE EDUCATION/TRAINING PROGRAM

## 2023-02-07 PROCEDURE — 1126F AMNT PAIN NOTED NONE PRSNT: CPT | Mod: CPTII,,, | Performed by: STUDENT IN AN ORGANIZED HEALTH CARE EDUCATION/TRAINING PROGRAM

## 2023-02-07 PROCEDURE — 3074F SYST BP LT 130 MM HG: CPT | Mod: CPTII,,, | Performed by: STUDENT IN AN ORGANIZED HEALTH CARE EDUCATION/TRAINING PROGRAM

## 2023-02-07 PROCEDURE — 1101F PR PT FALLS ASSESS DOC 0-1 FALLS W/OUT INJ PAST YR: ICD-10-PCS | Mod: CPTII,,, | Performed by: STUDENT IN AN ORGANIZED HEALTH CARE EDUCATION/TRAINING PROGRAM

## 2023-02-07 PROCEDURE — 3288F FALL RISK ASSESSMENT DOCD: CPT | Mod: CPTII,,, | Performed by: STUDENT IN AN ORGANIZED HEALTH CARE EDUCATION/TRAINING PROGRAM

## 2023-02-07 PROCEDURE — 1126F PR PAIN SEVERITY QUANTIFIED, NO PAIN PRESENT: ICD-10-PCS | Mod: CPTII,,, | Performed by: STUDENT IN AN ORGANIZED HEALTH CARE EDUCATION/TRAINING PROGRAM

## 2023-02-07 PROCEDURE — 3074F PR MOST RECENT SYSTOLIC BLOOD PRESSURE < 130 MM HG: ICD-10-PCS | Mod: CPTII,,, | Performed by: STUDENT IN AN ORGANIZED HEALTH CARE EDUCATION/TRAINING PROGRAM

## 2023-02-07 PROCEDURE — 3288F PR FALLS RISK ASSESSMENT DOCUMENTED: ICD-10-PCS | Mod: CPTII,,, | Performed by: STUDENT IN AN ORGANIZED HEALTH CARE EDUCATION/TRAINING PROGRAM

## 2023-02-07 PROCEDURE — 1101F PT FALLS ASSESS-DOCD LE1/YR: CPT | Mod: CPTII,,, | Performed by: STUDENT IN AN ORGANIZED HEALTH CARE EDUCATION/TRAINING PROGRAM

## 2023-02-07 PROCEDURE — 3078F PR MOST RECENT DIASTOLIC BLOOD PRESSURE < 80 MM HG: ICD-10-PCS | Mod: CPTII,,, | Performed by: STUDENT IN AN ORGANIZED HEALTH CARE EDUCATION/TRAINING PROGRAM

## 2023-02-07 NOTE — PROGRESS NOTES
Patient ID: [id number]     Chief Complaint: Medicare AWV        HPI:      Disclaimer:  This note is prepared using voice recognition software and as such is likely to have errors despite attempts at proofreading. Please contact me for questions.     Gustavo Lanza is a 77 y.o. male here today for wellness    Acute Issues-  No new concerns today.  Patient reports that he does not want to be seen anymore in any of the medicine clinics as he does not believe in allopathic medicine and believes in herbal medications.  He also reported that he is going to go to every clinic internal need his doctor patient relationship as he believes in holistic and herbal approach.    Chronic Issues-    BPH (benign prostatic hyperplasia)  Chronic idiopathic constipation  COPD (chronic obstructive pulmonary disease)  Coronary artery disease involving native coronary artery of native   heart without angina pectoris  Impaired fasting glucose  Mixed hyperlipidemia  Raynaud disease  S/P AAA repair     Past Surgical History:   Procedure Laterality Date    CARDIAC SURGERY      COLONOSCOPY  11/12/2021    CARLOS MOHAMUD MD    REPAIR OF HEART VALVE         Review of patient's allergies indicates:  No Known Allergies    Outpatient Medications Marked as Taking for the 2/7/23 encounter (Office Visit) with Kayli Dorantes MD   Medication Sig Dispense Refill    aspirin (ECOTRIN) 81 MG EC tablet Take 81 mg by mouth once daily.      carvediloL (COREG) 6.25 MG tablet Take 6.25 mg by mouth 2 (two) times daily.      doxepin (SINEQUAN) 10 MG capsule doxepin 10 mg capsule 30 capsule 5    HYDROcodone-acetaminophen (NORCO) 7.5-325 mg per tablet hydrocodone 7.5 mg-acetaminophen 325 mg tablet      linaCLOtide (LINZESS) 72 mcg Cap capsule Take 1 capsule (72 mcg total) by mouth before breakfast. 30 capsule 5    naproxen (NAPROSYN) 500 MG tablet Take 1 tablet (500 mg total) by mouth 2 (two) times daily with meals. 60 tablet 3    rosuvastatin (CRESTOR) 5 MG  "tablet Take 5 mg by mouth nightly.      tamsulosin (FLOMAX) 0.4 mg Cap Take 1 capsule by mouth once daily.      tiotropium-olodateroL (STIOLTO RESPIMAT) 2.5-2.5 mcg/actuation Mist Inhale 2 puffs into the lungs daily as needed. 4 g 3    traZODone (DESYREL) 150 MG tablet trazodone 150 mg tablet         Social History     Socioeconomic History    Marital status:    Tobacco Use    Smoking status: Light Smoker     Types: Cigarettes     Passive exposure: Current    Smokeless tobacco: Current   Substance and Sexual Activity    Alcohol use: Never    Drug use: Never    Sexual activity: Yes        Family History   Problem Relation Age of Onset    No Known Problems Mother     No Known Problems Father     No Known Problems Sister     No Known Problems Brother         Patient Care Team:  Kayli Dorantes MD as PCP - General (Family Medicine)     Subjective:     ROS    See HPI for details    Unable to obtain      Objective:     /78 (BP Location: Left arm, Patient Position: Sitting, BP Method: Large (Manual))   Pulse 73   Temp 98.1 °F (36.7 °C) (Oral)   Resp 17   Ht 5' 6" (1.676 m)   Wt 56.1 kg (123 lb 11.2 oz)   SpO2 99%   BMI 19.97 kg/m²     Physical Exam    General: Alert and oriented, No acute distress.  Psychiatric: Normal interaction, Coherent speech, Euthymic mood, Appropriate affect         Assessment:       ICD-10-CM ICD-9-CM   1. Wellness examination  Z00.00 V70.0        Plan:     1. Wellness examination  Discussed in detail regarding is wellness visit but patient was adamant that he does not want to be treated by allopathic approach.  He would like to try holistic/orbital approach.  I wish him all the best future.  I also let him know that if he needs us any time he can make an appointment.  Patient voiced understanding and agrees with the plan.         Medication List with Changes/Refills   Current Medications    ASPIRIN (ECOTRIN) 81 MG EC TABLET    Take 81 mg by mouth once daily.       Start " Date: --        End Date: --    CARVEDILOL (COREG) 6.25 MG TABLET    Take 6.25 mg by mouth 2 (two) times daily.       Start Date: 11/29/2021End Date: --    DOXEPIN (SINEQUAN) 10 MG CAPSULE    doxepin 10 mg capsule       Start Date: 6/2/2022  End Date: --    HYDROCODONE-ACETAMINOPHEN (NORCO) 7.5-325 MG PER TABLET    hydrocodone 7.5 mg-acetaminophen 325 mg tablet       Start Date: --        End Date: --    LINACLOTIDE (LINZESS) 72 MCG CAP CAPSULE    Take 1 capsule (72 mcg total) by mouth before breakfast.       Start Date: 8/3/2022  End Date: --    NAPROXEN (NAPROSYN) 500 MG TABLET    Take 1 tablet (500 mg total) by mouth 2 (two) times daily with meals.       Start Date: 7/11/2022 End Date: --    ROSUVASTATIN (CRESTOR) 5 MG TABLET    Take 5 mg by mouth nightly.       Start Date: 4/6/2022  End Date: --    TAMSULOSIN (FLOMAX) 0.4 MG CAP    Take 1 capsule by mouth once daily.       Start Date: 5/24/2022 End Date: --    TIOTROPIUM-OLODATEROL (STIOLTO RESPIMAT) 2.5-2.5 MCG/ACTUATION MIST    Inhale 2 puffs into the lungs daily as needed.       Start Date: 10/27/2022End Date: --    TRAZODONE (DESYREL) 150 MG TABLET    trazodone 150 mg tablet       Start Date: --        End Date: --          Follow up for as needed. In addition to their scheduled follow up, the patient has also been instructed to follow up on as needed basis.

## 2023-04-06 RX ORDER — ROSUVASTATIN CALCIUM 5 MG/1
5 TABLET, COATED ORAL NIGHTLY
Qty: 90 TABLET | Refills: 3 | Status: SHIPPED | OUTPATIENT
Start: 2023-04-06 | End: 2023-09-06 | Stop reason: SDUPTHER

## 2023-04-06 NOTE — TELEPHONE ENCOUNTER
----- Message from Radha Guerra sent at 4/6/2023  8:36 AM CDT -----  Regarding: Med refill  [facility] @ [address] is requesting a refill on Rosuvastatin 10mg tablets, Qty.90. Take 1 tablet by mouth daily.

## 2023-04-25 DIAGNOSIS — I73.00 RAYNAUD'S DISEASE WITHOUT GANGRENE: Primary | Chronic | ICD-10-CM

## 2023-04-25 RX ORDER — TRAZODONE HYDROCHLORIDE 150 MG/1
TABLET ORAL
Qty: 30 TABLET | Refills: 0 | Status: SHIPPED | OUTPATIENT
Start: 2023-04-25 | End: 2023-06-06

## 2023-04-25 NOTE — TELEPHONE ENCOUNTER
----- Message from [facility] sent at 4/24/2023  3:03 PM CDT -----  Regarding: refill  .Type:  RX Refill Request    Who Called:  pt    Refill or New Rx: traZODone (DESYREL) 150 MG tablet      How is the patient currently taking it? (ex. 1XDay): one day    Is this a 30 day or 90 day RX: 90    Preferred Pharmacy with phone number: [facility]    Local or Mail Order: local    Ordering Provider: Jayna    Would the patient rather a call back? Yes    Best Call Back Number: [phone]    Additional Information:  refill

## 2023-05-02 ENCOUNTER — OFFICE VISIT (OUTPATIENT)
Dept: URGENT CARE | Facility: CLINIC | Age: 78
End: 2023-05-02
Payer: MEDICARE

## 2023-05-02 VITALS
BODY MASS INDEX: 19.77 KG/M2 | TEMPERATURE: 98 F | OXYGEN SATURATION: 97 % | RESPIRATION RATE: 16 BRPM | HEIGHT: 66 IN | WEIGHT: 123 LBS | DIASTOLIC BLOOD PRESSURE: 73 MMHG | HEART RATE: 92 BPM | SYSTOLIC BLOOD PRESSURE: 130 MMHG

## 2023-05-02 DIAGNOSIS — L30.9 DERMATITIS: Primary | ICD-10-CM

## 2023-05-02 DIAGNOSIS — R73.01 IMPAIRED FASTING GLUCOSE: ICD-10-CM

## 2023-05-02 LAB — GLUCOSE SERPL-MCNC: 112 MG/DL (ref 70–110)

## 2023-05-02 PROCEDURE — 82962 GLUCOSE BLOOD TEST: CPT | Mod: ,,,

## 2023-05-02 PROCEDURE — 99213 PR OFFICE/OUTPT VISIT, EST, LEVL III, 20-29 MIN: ICD-10-PCS | Mod: ,,,

## 2023-05-02 PROCEDURE — 82962 POCT GLUCOSE, HAND-HELD DEVICE: ICD-10-PCS | Mod: ,,,

## 2023-05-02 PROCEDURE — 99213 OFFICE O/P EST LOW 20 MIN: CPT | Mod: ,,,

## 2023-05-02 RX ORDER — TRIAMCINOLONE ACETONIDE 5 MG/G
OINTMENT TOPICAL 2 TIMES DAILY
Qty: 15 G | Refills: 1 | Status: SHIPPED | OUTPATIENT
Start: 2023-05-02 | End: 2023-07-05

## 2023-05-02 NOTE — PROGRESS NOTES
"Subjective:      Patient ID: Gustavo Lanza is a 77 y.o. male.    Vitals:  height is 5' 6" (1.676 m) and weight is 55.8 kg (123 lb). His temperature is 98.4 °F (36.9 °C). His blood pressure is 130/73 and his pulse is 92. His respiration is 16 and oxygen saturation is 97%.     Chief Complaint: Bumps (Itchy bumps to back of neck 2 weeks. No otc meds applied.)    A 78 y/o male presents to the clinic with c/o a rash to the back of his neck x 2 weeks. He reports that he does do a lot of work outside in his yard around trees. He denies ever previously getting a poison ivy rash. He denies any pain to the area but reports that it is very itchy. He also denies changing soaps or detergents, or taking any new supplements or medications. He denies any sob, cp, n/v/d, abdominal complaints, neck stiffness, or changes in intake or output.         Constitution: Negative. Negative for fever.   HENT: Negative.     Neck: neck negative.   Cardiovascular: Negative.    Eyes: Negative.    Respiratory: Negative.     Musculoskeletal: Negative.    Skin:  Positive for rash.   Allergic/Immunologic: Negative.    Neurological: Negative.    Hematologic/Lymphatic: Negative.     Objective:     Physical Exam   Constitutional: He is oriented to person, place, and time. He appears well-developed. He is cooperative.  Non-toxic appearance. He does not appear ill. No distress.   HENT:   Head: Normocephalic and atraumatic.   Ears:   Right Ear: Hearing and external ear normal.   Left Ear: Hearing and external ear normal.   Nose: No congestion.   Mouth/Throat: Mucous membranes are normal. Mucous membranes are moist. Oropharynx is clear.   Eyes: Conjunctivae and lids are normal.   Neck: Trachea normal and phonation normal. Neck supple. No edema present. No erythema present. No neck rigidity present.   Cardiovascular: Normal rate and normal heart sounds.   Pulmonary/Chest: Effort normal. He has no decreased breath sounds. He has no rhonchi.   Abdominal: " Normal appearance.   Neurological: He is alert and oriented to person, place, and time. He exhibits normal muscle tone.   Skin: Skin is warm, intact, not diaphoretic, rash and maculopapular (5 circular areas less than 1 cm scattered on the back of the neck, maculopapular and erythematous, the area on the right side of his neck has some scaling). Capillary refill takes less than 2 seconds.   Psychiatric: His speech is normal and behavior is normal. Mood normal.   Nursing note and vitals reviewed.    Assessment:     1. Dermatitis    2. Impaired fasting glucose        Plan:       Dermatitis    Impaired fasting glucose  -     POCT Glucose, Hand-Held Device    Other orders  -     triamcinolone (KENALOG) 0.5 % ointment; Apply topically 2 (two) times daily. for 7 days  Dispense: 15 g; Refill: 1    Use the steroid cream as directed. Keep the area clean and dry   Take non-sedating oral antihistamine like zyrtec or Claritin during the day and benadryl at night for itching; caution with sedation with the benadryl   Return to clinic or follow up with pcp if rash persist or worsens over the next few days   Seek immediate care for any of the following:     You start to have severe trouble breathing or swallowing (for example, you cannot speak in full sentences).  The rash spreads over large parts of your body and most of your skin becomes red.  You have a fever of 100.4°F (38°C) or higher or chills.  You have signs of a wound infection like swelling, redness, warmth, pain, or drainage from the wound.

## 2023-05-02 NOTE — PATIENT INSTRUCTIONS
Use the steroid cream as directed. Keep the area clean and dry   Take non-sedating oral antihistamine like zyrtec or Claritin during the day and benadryl at night for itching; caution with sedation with the benadryl   Return to clinic or follow up with pcp if rash persist or worsens over the next few days   Seek immediate care for any of the following:     You start to have severe trouble breathing or swallowing (for example, you cannot speak in full sentences).  The rash spreads over large parts of your body and most of your skin becomes red.  You have a fever of 100.4°F (38°C) or higher or chills.  You have signs of a wound infection like swelling, redness, warmth, pain, or drainage from the wound.

## 2023-05-23 ENCOUNTER — TELEPHONE (OUTPATIENT)
Dept: FAMILY MEDICINE | Facility: CLINIC | Age: 78
End: 2023-05-23
Payer: MEDICARE

## 2023-05-23 DIAGNOSIS — I73.00 RAYNAUD'S DISEASE WITHOUT GANGRENE: Chronic | ICD-10-CM

## 2023-05-23 DIAGNOSIS — R79.9 ABNORMAL FINDING OF BLOOD CHEMISTRY, UNSPECIFIED: ICD-10-CM

## 2023-05-23 DIAGNOSIS — I10 BENIGN ESSENTIAL HTN: Primary | ICD-10-CM

## 2023-05-23 DIAGNOSIS — E78.5 HYPERLIPIDEMIA, UNSPECIFIED HYPERLIPIDEMIA TYPE: ICD-10-CM

## 2023-05-23 DIAGNOSIS — Z12.5 ENCOUNTER FOR SCREENING FOR MALIGNANT NEOPLASM OF PROSTATE: ICD-10-CM

## 2023-05-23 RX ORDER — TRAZODONE HYDROCHLORIDE 150 MG/1
TABLET ORAL
Qty: 30 TABLET | Refills: 0 | Status: SHIPPED | OUTPATIENT
Start: 2023-05-23 | End: 2023-06-06 | Stop reason: SDUPTHER

## 2023-05-23 RX ORDER — TRAZODONE HYDROCHLORIDE 150 MG/1
TABLET ORAL
Qty: 30 TABLET | Refills: 0 | Status: SHIPPED | OUTPATIENT
Start: 2023-05-23 | End: 2023-05-23 | Stop reason: SDUPTHER

## 2023-05-23 NOTE — TELEPHONE ENCOUNTER
Called patient to let him know wellness labs were put in. Pt also inquired about his medication. Told him that should of been sent to his pharmacy as well. Pt voiced understanding.

## 2023-05-23 NOTE — TELEPHONE ENCOUNTER
Patient called in regards to his labs. Has wellness appt coming up on 6/6/2023 but there are no wellness labs in his chart.    Please put in wellness labs. Thanks!

## 2023-05-31 ENCOUNTER — LAB VISIT (OUTPATIENT)
Dept: LAB | Facility: HOSPITAL | Age: 78
End: 2023-05-31
Attending: STUDENT IN AN ORGANIZED HEALTH CARE EDUCATION/TRAINING PROGRAM
Payer: MEDICARE

## 2023-05-31 DIAGNOSIS — R79.9 ABNORMAL FINDING OF BLOOD CHEMISTRY, UNSPECIFIED: ICD-10-CM

## 2023-05-31 DIAGNOSIS — I10 BENIGN ESSENTIAL HTN: ICD-10-CM

## 2023-05-31 DIAGNOSIS — Z12.5 ENCOUNTER FOR SCREENING FOR MALIGNANT NEOPLASM OF PROSTATE: ICD-10-CM

## 2023-05-31 LAB
ALBUMIN SERPL-MCNC: 3.6 G/DL (ref 3.4–4.8)
ALBUMIN/GLOB SERPL: 1.2 RATIO (ref 1.1–2)
ALP SERPL-CCNC: 74 UNIT/L (ref 40–150)
ALT SERPL-CCNC: 16 UNIT/L (ref 0–55)
APPEARANCE UR: ABNORMAL
AST SERPL-CCNC: 22 UNIT/L (ref 5–34)
BACTERIA #/AREA URNS AUTO: ABNORMAL /HPF
BASOPHILS # BLD AUTO: 0.05 X10(3)/MCL
BASOPHILS NFR BLD AUTO: 0.6 %
BILIRUB UR QL STRIP.AUTO: NEGATIVE MG/DL
BILIRUBIN DIRECT+TOT PNL SERPL-MCNC: 0.4 MG/DL
BUN SERPL-MCNC: 25.8 MG/DL (ref 8.4–25.7)
CALCIUM SERPL-MCNC: 9.1 MG/DL (ref 8.8–10)
CAOX CRY URNS QL MICRO: ABNORMAL /HPF
CHLORIDE SERPL-SCNC: 106 MMOL/L (ref 98–107)
CO2 SERPL-SCNC: 30 MMOL/L (ref 23–31)
COLOR UR: ABNORMAL
CREAT SERPL-MCNC: 1.35 MG/DL (ref 0.73–1.18)
CREAT UR-MCNC: 181.5 MG/DL (ref 63–166)
EOSINOPHIL # BLD AUTO: 0.08 X10(3)/MCL (ref 0–0.9)
EOSINOPHIL NFR BLD AUTO: 0.9 %
ERYTHROCYTE [DISTWIDTH] IN BLOOD BY AUTOMATED COUNT: 13.9 % (ref 11.5–17)
EST. AVERAGE GLUCOSE BLD GHB EST-MCNC: 116.9 MG/DL
GFR SERPLBLD CREATININE-BSD FMLA CKD-EPI: 55 MLS/MIN/1.73/M2
GLOBULIN SER-MCNC: 2.9 GM/DL (ref 2.4–3.5)
GLUCOSE SERPL-MCNC: 133 MG/DL (ref 82–115)
GLUCOSE UR QL STRIP.AUTO: NEGATIVE MG/DL
HBA1C MFR BLD: 5.7 %
HCT VFR BLD AUTO: 45.1 % (ref 42–52)
HGB BLD-MCNC: 14.6 G/DL (ref 14–18)
IMM GRANULOCYTES # BLD AUTO: 0.03 X10(3)/MCL (ref 0–0.04)
IMM GRANULOCYTES NFR BLD AUTO: 0.3 %
KETONES UR QL STRIP.AUTO: ABNORMAL MG/DL
LEUKOCYTE ESTERASE UR QL STRIP.AUTO: ABNORMAL UNIT/L
LYMPHOCYTES # BLD AUTO: 1.31 X10(3)/MCL (ref 0.6–4.6)
LYMPHOCYTES NFR BLD AUTO: 14.8 %
MCH RBC QN AUTO: 30.3 PG (ref 27–31)
MCHC RBC AUTO-ENTMCNC: 32.4 G/DL (ref 33–36)
MCV RBC AUTO: 93.6 FL (ref 80–94)
MICROALBUMIN UR-MCNC: 79.3 UG/ML
MICROALBUMIN/CREAT RATIO PNL UR: 43.7 MG/GM CR (ref 0–30)
MONOCYTES # BLD AUTO: 0.71 X10(3)/MCL (ref 0.1–1.3)
MONOCYTES NFR BLD AUTO: 8 %
NEUTROPHILS # BLD AUTO: 6.68 X10(3)/MCL (ref 2.1–9.2)
NEUTROPHILS NFR BLD AUTO: 75.4 %
NITRITE UR QL STRIP.AUTO: POSITIVE
NRBC BLD AUTO-RTO: 0 %
PH UR STRIP.AUTO: 5.5 [PH]
PLATELET # BLD AUTO: 222 X10(3)/MCL (ref 130–400)
PMV BLD AUTO: 9.8 FL (ref 7.4–10.4)
POTASSIUM SERPL-SCNC: 4.2 MMOL/L (ref 3.5–5.1)
PROT SERPL-MCNC: 6.5 GM/DL (ref 5.8–7.6)
PROT UR QL STRIP.AUTO: ABNORMAL MG/DL
PSA SERPL-MCNC: 4.54 NG/ML
RBC # BLD AUTO: 4.82 X10(6)/MCL (ref 4.7–6.1)
RBC #/AREA URNS AUTO: <5 /HPF
RBC UR QL AUTO: NEGATIVE UNIT/L
SODIUM SERPL-SCNC: 142 MMOL/L (ref 136–145)
SP GR UR STRIP.AUTO: 1.02 (ref 1–1.03)
SPERM URNS QL MICRO: ABNORMAL /HPF
SQUAMOUS #/AREA URNS AUTO: <5 /HPF
TSH SERPL-ACNC: 1.67 UIU/ML (ref 0.35–4.94)
UROBILINOGEN UR STRIP-ACNC: 1 MG/DL
WBC # SPEC AUTO: 8.86 X10(3)/MCL (ref 4.5–11.5)
WBC #/AREA URNS AUTO: 203 /HPF

## 2023-05-31 PROCEDURE — 84443 ASSAY THYROID STIM HORMONE: CPT

## 2023-05-31 PROCEDURE — 84153 ASSAY OF PSA TOTAL: CPT

## 2023-05-31 PROCEDURE — 80053 COMPREHEN METABOLIC PANEL: CPT

## 2023-05-31 PROCEDURE — 83036 HEMOGLOBIN GLYCOSYLATED A1C: CPT

## 2023-05-31 PROCEDURE — 85025 COMPLETE CBC W/AUTO DIFF WBC: CPT

## 2023-05-31 PROCEDURE — 36415 COLL VENOUS BLD VENIPUNCTURE: CPT

## 2023-05-31 PROCEDURE — 82043 UR ALBUMIN QUANTITATIVE: CPT

## 2023-06-01 DIAGNOSIS — N53.14 RETROGRADE EJACULATION: Primary | ICD-10-CM

## 2023-06-01 DIAGNOSIS — N39.0 URINARY TRACT INFECTION WITHOUT HEMATURIA, SITE UNSPECIFIED: ICD-10-CM

## 2023-06-01 DIAGNOSIS — R97.20 ELEVATED PSA: Primary | ICD-10-CM

## 2023-06-01 RX ORDER — NITROFURANTOIN 25; 75 MG/1; MG/1
100 CAPSULE ORAL 2 TIMES DAILY
Qty: 20 CAPSULE | Refills: 0 | Status: SHIPPED | OUTPATIENT
Start: 2023-06-01 | End: 2023-06-04

## 2023-06-01 NOTE — PROGRESS NOTES
Please inform patient of lab results.    1. HIGH PSA level    2. Referral sent to urology    Thanks,    Dr. Dorantes

## 2023-06-02 LAB — BACTERIA UR CULT: ABNORMAL

## 2023-06-04 ENCOUNTER — HOSPITAL ENCOUNTER (EMERGENCY)
Facility: HOSPITAL | Age: 78
Discharge: HOME | End: 2023-06-04
Attending: EMERGENCY MEDICINE
Payer: MEDICARE

## 2023-06-04 VITALS
HEIGHT: 66 IN | HEART RATE: 98 BPM | TEMPERATURE: 98 F | SYSTOLIC BLOOD PRESSURE: 113 MMHG | OXYGEN SATURATION: 94 % | BODY MASS INDEX: 19.77 KG/M2 | RESPIRATION RATE: 18 BRPM | WEIGHT: 123 LBS | DIASTOLIC BLOOD PRESSURE: 57 MMHG

## 2023-06-04 DIAGNOSIS — J44.1 COPD EXACERBATION: Primary | ICD-10-CM

## 2023-06-04 DIAGNOSIS — R06.00 DYSPNEA: ICD-10-CM

## 2023-06-04 LAB
ALBUMIN SERPL-MCNC: 3.9 G/DL (ref 3.4–4.8)
ALBUMIN/GLOB SERPL: 1.3 RATIO (ref 1.1–2)
ALP SERPL-CCNC: 73 UNIT/L (ref 40–150)
ALT SERPL-CCNC: 18 UNIT/L (ref 0–55)
AMORPH PHOS CRY URNS QL MICRO: ABNORMAL /HPF
APPEARANCE UR: ABNORMAL
AST SERPL-CCNC: 23 UNIT/L (ref 5–34)
BACTERIA #/AREA URNS AUTO: ABNORMAL /HPF
BASOPHILS # BLD AUTO: 0.06 X10(3)/MCL
BASOPHILS NFR BLD AUTO: 0.3 %
BILIRUB UR QL STRIP.AUTO: NEGATIVE MG/DL
BILIRUBIN DIRECT+TOT PNL SERPL-MCNC: 0.7 MG/DL
BNP BLD-MCNC: 281.7 PG/ML
BUN SERPL-MCNC: 25.2 MG/DL (ref 8.4–25.7)
CALCIUM SERPL-MCNC: 9.8 MG/DL (ref 8.8–10)
CHLORIDE SERPL-SCNC: 101 MMOL/L (ref 98–107)
CO2 SERPL-SCNC: 29 MMOL/L (ref 23–31)
COLOR UR: YELLOW
CREAT SERPL-MCNC: 1.3 MG/DL (ref 0.73–1.18)
EOSINOPHIL # BLD AUTO: 0.02 X10(3)/MCL (ref 0–0.9)
EOSINOPHIL NFR BLD AUTO: 0.1 %
ERYTHROCYTE [DISTWIDTH] IN BLOOD BY AUTOMATED COUNT: 13.7 % (ref 11.5–17)
FLUAV AG UPPER RESP QL IA.RAPID: NOT DETECTED
FLUBV AG UPPER RESP QL IA.RAPID: NOT DETECTED
GFR SERPLBLD CREATININE-BSD FMLA CKD-EPI: 57 MLS/MIN/1.73/M2
GLOBULIN SER-MCNC: 3 GM/DL (ref 2.4–3.5)
GLUCOSE SERPL-MCNC: 91 MG/DL (ref 82–115)
GLUCOSE UR QL STRIP.AUTO: NEGATIVE MG/DL
HCT VFR BLD AUTO: 43.5 % (ref 42–52)
HGB BLD-MCNC: 14.7 G/DL (ref 14–18)
IMM GRANULOCYTES # BLD AUTO: 0.06 X10(3)/MCL (ref 0–0.04)
IMM GRANULOCYTES NFR BLD AUTO: 0.3 %
KETONES UR QL STRIP.AUTO: 15 MG/DL
LEUKOCYTE ESTERASE UR QL STRIP.AUTO: ABNORMAL UNIT/L
LYMPHOCYTES # BLD AUTO: 0.84 X10(3)/MCL (ref 0.6–4.6)
LYMPHOCYTES NFR BLD AUTO: 4.7 %
MCH RBC QN AUTO: 30.5 PG (ref 27–31)
MCHC RBC AUTO-ENTMCNC: 33.8 G/DL (ref 33–36)
MCV RBC AUTO: 90.2 FL (ref 80–94)
MONOCYTES # BLD AUTO: 0.94 X10(3)/MCL (ref 0.1–1.3)
MONOCYTES NFR BLD AUTO: 5.3 %
NEUTROPHILS # BLD AUTO: 15.78 X10(3)/MCL (ref 2.1–9.2)
NEUTROPHILS NFR BLD AUTO: 89.3 %
NITRITE UR QL STRIP.AUTO: NEGATIVE
NRBC BLD AUTO-RTO: 0 %
PH UR STRIP.AUTO: 7.5 [PH]
PLATELET # BLD AUTO: 215 X10(3)/MCL (ref 130–400)
PMV BLD AUTO: 9.9 FL (ref 7.4–10.4)
POTASSIUM SERPL-SCNC: 4.4 MMOL/L (ref 3.5–5.1)
PROT SERPL-MCNC: 6.9 GM/DL (ref 5.8–7.6)
PROT UR QL STRIP.AUTO: NEGATIVE MG/DL
RBC # BLD AUTO: 4.82 X10(6)/MCL (ref 4.7–6.1)
RBC #/AREA URNS AUTO: ABNORMAL /HPF
RBC UR QL AUTO: NEGATIVE UNIT/L
RSV A 5' UTR RNA NPH QL NAA+PROBE: NOT DETECTED
SARS-COV-2 RNA RESP QL NAA+PROBE: NOT DETECTED
SODIUM SERPL-SCNC: 140 MMOL/L (ref 136–145)
SP GR UR STRIP.AUTO: 1.02
SQUAMOUS #/AREA URNS AUTO: ABNORMAL /HPF
TROPONIN I SERPL-MCNC: 0.02 NG/ML (ref 0–0.04)
UROBILINOGEN UR STRIP-ACNC: 1 MG/DL
WBC # SPEC AUTO: 17.7 X10(3)/MCL (ref 4.5–11.5)
WBC #/AREA URNS AUTO: ABNORMAL /HPF

## 2023-06-04 PROCEDURE — 84484 ASSAY OF TROPONIN QUANT: CPT | Performed by: EMERGENCY MEDICINE

## 2023-06-04 PROCEDURE — 87088 URINE BACTERIA CULTURE: CPT | Performed by: EMERGENCY MEDICINE

## 2023-06-04 PROCEDURE — 25000242 PHARM REV CODE 250 ALT 637 W/ HCPCS: Performed by: EMERGENCY MEDICINE

## 2023-06-04 PROCEDURE — 94761 N-INVAS EAR/PLS OXIMETRY MLT: CPT

## 2023-06-04 PROCEDURE — 25000003 PHARM REV CODE 250: Performed by: EMERGENCY MEDICINE

## 2023-06-04 PROCEDURE — 93010 EKG 12-LEAD: ICD-10-PCS | Mod: ,,, | Performed by: INTERNAL MEDICINE

## 2023-06-04 PROCEDURE — 94640 AIRWAY INHALATION TREATMENT: CPT

## 2023-06-04 PROCEDURE — 63600175 PHARM REV CODE 636 W HCPCS: Performed by: EMERGENCY MEDICINE

## 2023-06-04 PROCEDURE — 96374 THER/PROPH/DIAG INJ IV PUSH: CPT

## 2023-06-04 PROCEDURE — 80053 COMPREHEN METABOLIC PANEL: CPT | Performed by: EMERGENCY MEDICINE

## 2023-06-04 PROCEDURE — 81001 URINALYSIS AUTO W/SCOPE: CPT | Performed by: EMERGENCY MEDICINE

## 2023-06-04 PROCEDURE — 93010 ELECTROCARDIOGRAM REPORT: CPT | Mod: ,,, | Performed by: INTERNAL MEDICINE

## 2023-06-04 PROCEDURE — 93005 ELECTROCARDIOGRAM TRACING: CPT

## 2023-06-04 PROCEDURE — 99285 EMERGENCY DEPT VISIT HI MDM: CPT | Mod: 25

## 2023-06-04 PROCEDURE — 0241U COVID/RSV/FLU A&B PCR: CPT | Performed by: EMERGENCY MEDICINE

## 2023-06-04 PROCEDURE — 99900031 HC PATIENT EDUCATION (STAT)

## 2023-06-04 PROCEDURE — 83880 ASSAY OF NATRIURETIC PEPTIDE: CPT | Performed by: EMERGENCY MEDICINE

## 2023-06-04 PROCEDURE — 96375 TX/PRO/DX INJ NEW DRUG ADDON: CPT

## 2023-06-04 PROCEDURE — 85025 COMPLETE CBC W/AUTO DIFF WBC: CPT | Performed by: EMERGENCY MEDICINE

## 2023-06-04 RX ORDER — ALBUTEROL SULFATE 90 UG/1
1-2 AEROSOL, METERED RESPIRATORY (INHALATION) EVERY 6 HOURS PRN
Qty: 8 G | Refills: 0 | Status: SHIPPED | OUTPATIENT
Start: 2023-06-04 | End: 2023-06-06 | Stop reason: SDUPTHER

## 2023-06-04 RX ORDER — IPRATROPIUM BROMIDE AND ALBUTEROL SULFATE 2.5; .5 MG/3ML; MG/3ML
3 SOLUTION RESPIRATORY (INHALATION) ONCE
Status: COMPLETED | OUTPATIENT
Start: 2023-06-04 | End: 2023-06-04

## 2023-06-04 RX ORDER — MAGNESIUM SULFATE HEPTAHYDRATE 40 MG/ML
2 INJECTION, SOLUTION INTRAVENOUS
Status: COMPLETED | OUTPATIENT
Start: 2023-06-04 | End: 2023-06-04

## 2023-06-04 RX ORDER — SODIUM CHLORIDE 9 MG/ML
500 INJECTION, SOLUTION INTRAVENOUS
Status: COMPLETED | OUTPATIENT
Start: 2023-06-04 | End: 2023-06-04

## 2023-06-04 RX ORDER — IPRATROPIUM BROMIDE AND ALBUTEROL SULFATE 2.5; .5 MG/3ML; MG/3ML
3 SOLUTION RESPIRATORY (INHALATION) EVERY 6 HOURS PRN
Qty: 75 ML | Refills: 0 | Status: SHIPPED | OUTPATIENT
Start: 2023-06-04 | End: 2023-06-06 | Stop reason: SDUPTHER

## 2023-06-04 RX ORDER — PREDNISONE 50 MG/1
50 TABLET ORAL DAILY
Qty: 5 TABLET | Refills: 0 | Status: SHIPPED | OUTPATIENT
Start: 2023-06-05 | End: 2023-06-10

## 2023-06-04 RX ORDER — METHYLPREDNISOLONE SOD SUCC 125 MG
125 VIAL (EA) INJECTION
Status: COMPLETED | OUTPATIENT
Start: 2023-06-04 | End: 2023-06-04

## 2023-06-04 RX ORDER — LEVOFLOXACIN 750 MG/1
750 TABLET ORAL
Status: DISCONTINUED | OUTPATIENT
Start: 2023-06-04 | End: 2023-06-04 | Stop reason: HOSPADM

## 2023-06-04 RX ORDER — AZITHROMYCIN 250 MG/1
500 TABLET, FILM COATED ORAL
Status: DISCONTINUED | OUTPATIENT
Start: 2023-06-04 | End: 2023-06-04

## 2023-06-04 RX ORDER — LEVOFLOXACIN 750 MG/1
750 TABLET ORAL DAILY
Qty: 4 TABLET | Refills: 0 | Status: SHIPPED | OUTPATIENT
Start: 2023-06-05 | End: 2023-06-06 | Stop reason: SINTOL

## 2023-06-04 RX ADMIN — METHYLPREDNISOLONE SODIUM SUCCINATE 125 MG: 125 INJECTION, POWDER, FOR SOLUTION INTRAMUSCULAR; INTRAVENOUS at 12:06

## 2023-06-04 RX ADMIN — SODIUM CHLORIDE 500 ML: 9 INJECTION, SOLUTION INTRAVENOUS at 03:06

## 2023-06-04 RX ADMIN — LEVOFLOXACIN 750 MG: 750 TABLET, FILM COATED ORAL at 02:06

## 2023-06-04 RX ADMIN — MAGNESIUM SULFATE HEPTAHYDRATE 2 G: 40 INJECTION, SOLUTION INTRAVENOUS at 12:06

## 2023-06-04 RX ADMIN — IPRATROPIUM BROMIDE AND ALBUTEROL SULFATE 3 ML: .5; 3 SOLUTION RESPIRATORY (INHALATION) at 12:06

## 2023-06-04 RX ADMIN — IPRATROPIUM BROMIDE AND ALBUTEROL SULFATE 3 ML: .5; 3 SOLUTION RESPIRATORY (INHALATION) at 02:06

## 2023-06-04 NOTE — PROGRESS NOTES
Pharmacist Renal Dose Adjustment Note    Gustavo Lanza is a 77 y.o. male being treated with the medication Levaquin    Patient Data:    Vital Signs (Most Recent):  Temp: 98.1 °F (36.7 °C) (06/04/23 1211)  Pulse: 96 (06/04/23 1328)  Resp: (!) 24 (06/04/23 1328)  BP: 119/68 (06/04/23 1328)  SpO2: (!) 92 % (06/04/23 1227) Vital Signs (72h Range):  Temp:  [98.1 °F (36.7 °C)]   Pulse:  []   Resp:  [18-24]   BP: (115-119)/(68-81)   SpO2:  [92 %-95 %]      Recent Labs   Lab 05/31/23  0800 06/04/23  1215   CREATININE 1.35* 1.30*     Serum creatinine: 1.3 mg/dL (H) 06/04/23 1215  Estimated creatinine clearance: 38.8 mL/min (A)    Medication:Levaquin dose: 750mg frequency q24 will be changed to medication:Levaquin dose:750mg frequency:q48    Pharmacist's Name: Marva Mendoza  Pharmacist's Extension: [id number]

## 2023-06-04 NOTE — ED PROVIDER NOTES
Encounter Date: 6/4/2023       History     Chief Complaint   Patient presents with    Shortness of Breath     Shortness of breath onset after taking macrobid. Patient unsure why he was prescribed this.     Patient is a 76 yo M presenting with shortness of breath. He started an antibiotic for UTI by pcp. Became sob this morning after taking the second dose of macrobid. No hives or rash. No itching. Has a hx of copd but no medications for it at home. He otherwise denies any fevers, chest pain, leg swelling, abdominal pain, sore throat, or other complaints      Review of patient's allergies indicates:   Allergen Reactions    Nitrofurantoin monohyd/m-cryst      Past Medical History:   Diagnosis Date    BPH (benign prostatic hyperplasia) 8/3/2022    Chronic idiopathic constipation 8/3/2022    COPD (chronic obstructive pulmonary disease)     Coronary artery disease involving native coronary artery of native heart without angina pectoris 8/3/2022    Impaired fasting glucose 8/3/2022    Mixed hyperlipidemia 8/3/2022    Raynaud disease 8/3/2022    S/P AAA repair 8/3/2022     Past Surgical History:   Procedure Laterality Date    CARDIAC SURGERY      COLONOSCOPY  11/12/2021    CARLOS MOHAMUD MD    REPAIR OF HEART VALVE       Family History   Problem Relation Age of Onset    No Known Problems Mother     No Known Problems Father     No Known Problems Sister     No Known Problems Brother      Social History     Tobacco Use    Smoking status: Every Day     Packs/day: 0.50     Types: Cigarettes     Passive exposure: Current    Smokeless tobacco: Current   Substance Use Topics    Alcohol use: Never    Drug use: Never     Review of Systems   Constitutional:  Negative for fever.   HENT:  Negative for sore throat.    Respiratory:  Positive for cough and shortness of breath.    Cardiovascular:  Negative for chest pain.   Gastrointestinal:  Negative for nausea.   Genitourinary:  Negative for dysuria.   Musculoskeletal:  Negative for back  pain.   Skin:  Negative for rash.   Neurological:  Negative for weakness.   Hematological:  Does not bruise/bleed easily.     Physical Exam     Initial Vitals [06/04/23 1211]   BP Pulse Resp Temp SpO2   115/81 106 20 98.1 °F (36.7 °C) 95 %      MAP       --         Physical Exam    Nursing note and vitals reviewed.  Constitutional: He appears well-developed and well-nourished. He is not diaphoretic. No distress.   HENT:   Head: Normocephalic and atraumatic.   Eyes: Conjunctivae are normal.   Neck: Neck supple.   Cardiovascular:  Normal rate, regular rhythm and normal heart sounds.           Pulmonary/Chest: No respiratory distress. He has wheezes. He has no rhonchi.   Diminished breath sounds bilaterally   Abdominal: Abdomen is soft. Bowel sounds are normal. He exhibits no distension. There is no abdominal tenderness. There is no rebound and no guarding.   Musculoskeletal:         General: No edema. Normal range of motion.      Cervical back: Neck supple.     Neurological: He is alert and oriented to person, place, and time.   Skin: Skin is warm and dry.   Psychiatric: He has a normal mood and affect. Thought content normal.       ED Course   Procedures  Labs Reviewed   CULTURE, URINE - Abnormal; Notable for the following components:       Result Value    Urine Culture   (*)     Value: Less than 10,000 colonies/ml - 2 different species of Gram-negative Rods    Urine Culture Less than 10,000 colonies/ml Gram-positive Cocci (*)     All other components within normal limits   COMPREHENSIVE METABOLIC PANEL - Abnormal; Notable for the following components:    Creatinine 1.30 (*)     All other components within normal limits   B-TYPE NATRIURETIC PEPTIDE - Abnormal; Notable for the following components:    Natriuretic Peptide 281.7 (*)     All other components within normal limits   CBC WITH DIFFERENTIAL - Abnormal; Notable for the following components:    WBC 17.70 (*)     Neut # 15.78 (*)     IG# 0.06 (*)     All other  components within normal limits   URINALYSIS, REFLEX TO URINE CULTURE - Abnormal; Notable for the following components:    Appearance, UA SL CLOUDY (*)     Ketones, UA 15 (*)     Leukocyte Esterase, UA Moderate (*)     All other components within normal limits   URINALYSIS, MICROSCOPIC - Abnormal; Notable for the following components:    Bacteria, UA Few (*)     Amorphous Phosphate Crystals, UA Moderate (*)     WBC, UA 11-20 (*)     Squamous Epithelial Cells, UA Few (*)     All other components within normal limits   TROPONIN I - Normal   COVID/RSV/FLU A&B PCR - Normal    Narrative:     The Xpert Xpress SARS-CoV-2/FLU/RSV plus is a rapid, multiplexed real-time PCR test intended for the simultaneous qualitative detection and differentiation of SARS-CoV-2, Influenza A, Influenza B, and respiratory syncytial virus (RSV) viral RNA in either nasopharyngeal swab or nasal swab specimens.         CBC W/ AUTO DIFFERENTIAL    Narrative:     The following orders were created for panel order CBC auto differential.  Procedure                               Abnormality         Status                     ---------                               -----------         ------                     CBC with Differential[[id number]]        Abnormal            Final result                 Please view results for these tests on the individual orders.        ECG Results              EKG 12-lead (Final result)  Result time 06/05/23 22:36:51      Final result by Interface, Lab In [facility] (06/05/23 22:36:51)                   Narrative:    Test Reason : R06.00,    Vent. Rate : 103 BPM     Atrial Rate : 103 BPM     P-R Int : 170 ms          QRS Dur : 098 ms      QT Int : 354 ms       P-R-T Axes : 000 149 -26 degrees     QTc Int : 463 ms    Sinus tachycardia with frequent Premature ventricular complexes  Left posterior fascicular block  T wave abnormality, consider inferior ischemia  Abnormal ECG  When compared with ECG of 27-JUN-2022 21:39,  No  significant change was found  Confirmed by Dane Kaur MD ([id number]) on 6/5/2023 10:36:35 PM    Referred By: [email or web address]   SELF           Confirmed By:Dane Kaur MD                                     EKG 12-LEAD (Final result)  Result time 06/07/23 14:40:15      Final result by Unknown User (06/07/23 14:40:15)                                      Imaging Results              X-Ray Chest AP Portable (Final result)  Result time 06/04/23 13:23:37      Final result by Rick Cox MD (06/04/23 13:23:37)                   Impression:      No acute cardiopulmonary process.      Electronically signed by: Rick Cox  Date:    06/04/2023  Time:    13:23               Narrative:    EXAMINATION:  XR CHEST AP PORTABLE    CLINICAL HISTORY:  Chest Pain;    TECHNIQUE:  Single view of the chest    COMPARISON:  No prior imaging available for comparison.    FINDINGS:  Trace left effusion.  No focal opacification.    The cardiomediastinal silhouette is within normal limits with postsurgical changes of median sternotomy.    No acute osseous abnormality.                                       Medications   methylPREDNISolone sodium succinate injection 125 mg (125 mg Intravenous Given 6/4/23 1222)   albuterol-ipratropium 2.5 mg-0.5 mg/3 mL nebulizer solution 3 mL (3 mLs Nebulization Given 6/4/23 1227)   magnesium sulfate 2g in water 50mL IVPB (premix) (2 g Intravenous New Bag 6/4/23 1222)   albuterol-ipratropium 2.5 mg-0.5 mg/3 mL nebulizer solution 3 mL (3 mLs Nebulization Given 6/4/23 1456)   0.9%  NaCl infusion (500 mLs Intravenous New Bag 6/4/23 1541)                 ED Course as of 06/12/23 0800   Sun Jun 04, 2023   1410 Improved air movement with L better than the R. Patient reports feeling mildly better. Will repeat neb. CXR read as negative but I see some slight increaces opacification in the left field. Will goahead and cover COPD exacerbation with azithromycin with the presenting leukocytosis.  [GM]   1515 On  re-evaluation, patient feels much better, pretty much back to his baseline. He is no longer tachyonic. Oxygen sats are 93 % at rest. Patient reports he does not have inhaler or nebs at home. Will change antibiotic from Macrobid to Levaquin for better lung and  coverage. Will ambulate and recheck pulse ox. [GM]   1623 After ambulation, naida had oxygen sats 100%. Patient has no complaints and is comfortable with discharge.  [GM]   1631 Patient did not wait for d/c paper work. He was made aware that his scripts were sent to [facility] []      ED Course User Index  [GM] Caroline Madrigal MD                 Clinical Impression:   Final diagnoses:  [R06.00] Dyspnea  [J44.1] COPD exacerbation (Primary)        ED Disposition Condition    Discharge Stable          ED Prescriptions       Medication Sig Dispense Start Date End Date Auth. Provider    predniSONE (DELTASONE) 50 MG Tab () Take 1 tablet (50 mg total) by mouth once daily. for 5 days 5 tablet 2023 6/10/2023 Caroline Madrigal MD    albuterol (PROVENTIL/VENTOLIN HFA) 90 mcg/actuation inhaler () Inhale 1-2 puffs into the lungs every 6 (six) hours as needed for Wheezing. Rescue 8 g 2023 Caroline Madrigal MD    albuterol-ipratropium (DUO-NEB) 2.5 mg-0.5 mg/3 mL nebulizer solution () Take 3 mLs by nebulization every 6 (six) hours as needed for Wheezing. Rescue 75 mL 2023 Caroline Madrigal MD    levoFLOXacin (LEVAQUIN) 750 MG tablet () Take 1 tablet (750 mg total) by mouth once daily. 4 tablet 2023 Caroline Madrigal MD          Follow-up Information       Follow up With Specialties Details Why Contact Info    Kayli Dorantes MD Family Medicine In 2 days If symptoms worsen, return to the ED [id number] [facility]  [address]  [facility] [address]  [phone]               Caroline Madrigal MD  [phone]

## 2023-06-05 LAB
BACTERIA UR CULT: ABNORMAL
BACTERIA UR CULT: ABNORMAL

## 2023-06-06 ENCOUNTER — OFFICE VISIT (OUTPATIENT)
Dept: FAMILY MEDICINE | Facility: CLINIC | Age: 78
End: 2023-06-06
Payer: MEDICARE

## 2023-06-06 ENCOUNTER — TELEPHONE (OUTPATIENT)
Dept: FAMILY MEDICINE | Facility: CLINIC | Age: 78
End: 2023-06-06

## 2023-06-06 VITALS
RESPIRATION RATE: 17 BRPM | WEIGHT: 122.19 LBS | BODY MASS INDEX: 19.64 KG/M2 | SYSTOLIC BLOOD PRESSURE: 106 MMHG | HEART RATE: 87 BPM | TEMPERATURE: 98 F | HEIGHT: 66 IN | OXYGEN SATURATION: 98 % | DIASTOLIC BLOOD PRESSURE: 62 MMHG

## 2023-06-06 DIAGNOSIS — R97.20 PSA ELEVATION: ICD-10-CM

## 2023-06-06 DIAGNOSIS — J44.9 CHRONIC OBSTRUCTIVE PULMONARY DISEASE, UNSPECIFIED COPD TYPE: ICD-10-CM

## 2023-06-06 DIAGNOSIS — N39.0 URINARY TRACT INFECTION WITHOUT HEMATURIA, SITE UNSPECIFIED: ICD-10-CM

## 2023-06-06 DIAGNOSIS — Z12.2 ENCOUNTER FOR SCREENING FOR LUNG CANCER: ICD-10-CM

## 2023-06-06 DIAGNOSIS — N18.31 CHRONIC KIDNEY DISEASE, STAGE 3A: ICD-10-CM

## 2023-06-06 DIAGNOSIS — R80.9 MICROALBUMINURIA: ICD-10-CM

## 2023-06-06 DIAGNOSIS — I25.10 CORONARY ARTERY DISEASE INVOLVING NATIVE CORONARY ARTERY OF NATIVE HEART WITHOUT ANGINA PECTORIS: Chronic | ICD-10-CM

## 2023-06-06 DIAGNOSIS — G47.9 SLEEPING DIFFICULTIES: ICD-10-CM

## 2023-06-06 DIAGNOSIS — N39.0 URINARY TRACT INFECTION WITHOUT HEMATURIA, SITE UNSPECIFIED: Primary | ICD-10-CM

## 2023-06-06 DIAGNOSIS — D72.829 LEUKOCYTOSIS, UNSPECIFIED TYPE: ICD-10-CM

## 2023-06-06 DIAGNOSIS — I73.00 RAYNAUD'S DISEASE WITHOUT GANGRENE: Chronic | ICD-10-CM

## 2023-06-06 DIAGNOSIS — Z23 NEED FOR SHINGLES VACCINE: ICD-10-CM

## 2023-06-06 DIAGNOSIS — F17.210 NICOTINE DEPENDENCE, CIGARETTES, UNCOMPLICATED: ICD-10-CM

## 2023-06-06 DIAGNOSIS — R73.03 PREDIABETES: ICD-10-CM

## 2023-06-06 DIAGNOSIS — Z00.00 WELLNESS EXAMINATION: Primary | ICD-10-CM

## 2023-06-06 DIAGNOSIS — I73.9 PERIPHERAL VASCULAR DISEASE, UNSPECIFIED: ICD-10-CM

## 2023-06-06 DIAGNOSIS — K59.04 CHRONIC IDIOPATHIC CONSTIPATION: Chronic | ICD-10-CM

## 2023-06-06 DIAGNOSIS — Z23 NEED FOR TETANUS, DIPHTHERIA, AND ACELLULAR PERTUSSIS (TDAP) VACCINE: ICD-10-CM

## 2023-06-06 DIAGNOSIS — N40.1 BENIGN PROSTATIC HYPERPLASIA WITH LOWER URINARY TRACT SYMPTOMS, SYMPTOM DETAILS UNSPECIFIED: Chronic | ICD-10-CM

## 2023-06-06 DIAGNOSIS — E78.2 MIXED HYPERLIPIDEMIA: Chronic | ICD-10-CM

## 2023-06-06 PROCEDURE — 1126F PR PAIN SEVERITY QUANTIFIED, NO PAIN PRESENT: ICD-10-PCS | Mod: CPTII,,, | Performed by: STUDENT IN AN ORGANIZED HEALTH CARE EDUCATION/TRAINING PROGRAM

## 2023-06-06 PROCEDURE — 3074F SYST BP LT 130 MM HG: CPT | Mod: CPTII,,, | Performed by: STUDENT IN AN ORGANIZED HEALTH CARE EDUCATION/TRAINING PROGRAM

## 2023-06-06 PROCEDURE — 1101F PR PT FALLS ASSESS DOC 0-1 FALLS W/OUT INJ PAST YR: ICD-10-PCS | Mod: CPTII,,, | Performed by: STUDENT IN AN ORGANIZED HEALTH CARE EDUCATION/TRAINING PROGRAM

## 2023-06-06 PROCEDURE — G0439 PR MEDICARE ANNUAL WELLNESS SUBSEQUENT VISIT: ICD-10-PCS | Mod: ,,, | Performed by: STUDENT IN AN ORGANIZED HEALTH CARE EDUCATION/TRAINING PROGRAM

## 2023-06-06 PROCEDURE — 1101F PT FALLS ASSESS-DOCD LE1/YR: CPT | Mod: CPTII,,, | Performed by: STUDENT IN AN ORGANIZED HEALTH CARE EDUCATION/TRAINING PROGRAM

## 2023-06-06 PROCEDURE — 1159F PR MEDICATION LIST DOCUMENTED IN MEDICAL RECORD: ICD-10-PCS | Mod: CPTII,,, | Performed by: STUDENT IN AN ORGANIZED HEALTH CARE EDUCATION/TRAINING PROGRAM

## 2023-06-06 PROCEDURE — 3288F FALL RISK ASSESSMENT DOCD: CPT | Mod: CPTII,,, | Performed by: STUDENT IN AN ORGANIZED HEALTH CARE EDUCATION/TRAINING PROGRAM

## 2023-06-06 PROCEDURE — 3078F DIAST BP <80 MM HG: CPT | Mod: CPTII,,, | Performed by: STUDENT IN AN ORGANIZED HEALTH CARE EDUCATION/TRAINING PROGRAM

## 2023-06-06 PROCEDURE — 1126F AMNT PAIN NOTED NONE PRSNT: CPT | Mod: CPTII,,, | Performed by: STUDENT IN AN ORGANIZED HEALTH CARE EDUCATION/TRAINING PROGRAM

## 2023-06-06 PROCEDURE — 3078F PR MOST RECENT DIASTOLIC BLOOD PRESSURE < 80 MM HG: ICD-10-PCS | Mod: CPTII,,, | Performed by: STUDENT IN AN ORGANIZED HEALTH CARE EDUCATION/TRAINING PROGRAM

## 2023-06-06 PROCEDURE — 1159F MED LIST DOCD IN RCRD: CPT | Mod: CPTII,,, | Performed by: STUDENT IN AN ORGANIZED HEALTH CARE EDUCATION/TRAINING PROGRAM

## 2023-06-06 PROCEDURE — 1160F PR REVIEW ALL MEDS BY PRESCRIBER/CLIN PHARMACIST DOCUMENTED: ICD-10-PCS | Mod: CPTII,,, | Performed by: STUDENT IN AN ORGANIZED HEALTH CARE EDUCATION/TRAINING PROGRAM

## 2023-06-06 PROCEDURE — G0439 PPPS, SUBSEQ VISIT: HCPCS | Mod: ,,, | Performed by: STUDENT IN AN ORGANIZED HEALTH CARE EDUCATION/TRAINING PROGRAM

## 2023-06-06 PROCEDURE — 99406 BEHAV CHNG SMOKING 3-10 MIN: CPT | Mod: ,,, | Performed by: STUDENT IN AN ORGANIZED HEALTH CARE EDUCATION/TRAINING PROGRAM

## 2023-06-06 PROCEDURE — 99406 PR TOBACCO USE CESSATION INTERMEDIATE 3-10 MINUTES: ICD-10-PCS | Mod: ,,, | Performed by: STUDENT IN AN ORGANIZED HEALTH CARE EDUCATION/TRAINING PROGRAM

## 2023-06-06 PROCEDURE — 3288F PR FALLS RISK ASSESSMENT DOCUMENTED: ICD-10-PCS | Mod: CPTII,,, | Performed by: STUDENT IN AN ORGANIZED HEALTH CARE EDUCATION/TRAINING PROGRAM

## 2023-06-06 PROCEDURE — 99214 PR OFFICE/OUTPT VISIT, EST, LEVL IV, 30-39 MIN: ICD-10-PCS | Mod: 25,,, | Performed by: STUDENT IN AN ORGANIZED HEALTH CARE EDUCATION/TRAINING PROGRAM

## 2023-06-06 PROCEDURE — 99214 OFFICE O/P EST MOD 30 MIN: CPT | Mod: 25,,, | Performed by: STUDENT IN AN ORGANIZED HEALTH CARE EDUCATION/TRAINING PROGRAM

## 2023-06-06 PROCEDURE — 1160F RVW MEDS BY RX/DR IN RCRD: CPT | Mod: CPTII,,, | Performed by: STUDENT IN AN ORGANIZED HEALTH CARE EDUCATION/TRAINING PROGRAM

## 2023-06-06 PROCEDURE — 3074F PR MOST RECENT SYSTOLIC BLOOD PRESSURE < 130 MM HG: ICD-10-PCS | Mod: CPTII,,, | Performed by: STUDENT IN AN ORGANIZED HEALTH CARE EDUCATION/TRAINING PROGRAM

## 2023-06-06 RX ORDER — ALBUTEROL SULFATE 90 UG/1
1-2 AEROSOL, METERED RESPIRATORY (INHALATION) EVERY 6 HOURS PRN
Qty: 8 G | Refills: 2 | Status: SHIPPED | OUTPATIENT
Start: 2023-06-06 | End: 2023-09-06 | Stop reason: SDUPTHER

## 2023-06-06 RX ORDER — AMOXICILLIN AND CLAVULANATE POTASSIUM 875; 125 MG/1; MG/1
1 TABLET, FILM COATED ORAL EVERY 12 HOURS
Qty: 14 TABLET | Refills: 0 | Status: SHIPPED | OUTPATIENT
Start: 2023-06-06 | End: 2023-06-13

## 2023-06-06 RX ORDER — DAPAGLIFLOZIN 10 MG/1
10 TABLET, FILM COATED ORAL DAILY
Qty: 90 TABLET | Refills: 0 | Status: SHIPPED | OUTPATIENT
Start: 2023-06-06 | End: 2023-07-05

## 2023-06-06 RX ORDER — LEVOFLOXACIN 750 MG/1
750 TABLET ORAL DAILY
COMMUNITY
End: 2023-06-06

## 2023-06-06 RX ORDER — TRAZODONE HYDROCHLORIDE 150 MG/1
150 TABLET ORAL NIGHTLY
Qty: 90 TABLET | Refills: 0 | Status: SHIPPED | OUTPATIENT
Start: 2023-06-06 | End: 2023-08-24 | Stop reason: SDUPTHER

## 2023-06-06 RX ORDER — AMOXICILLIN AND CLAVULANATE POTASSIUM 875; 125 MG/1; MG/1
1 TABLET, FILM COATED ORAL EVERY 12 HOURS
Qty: 14 TABLET | Refills: 0 | Status: SHIPPED | OUTPATIENT
Start: 2023-06-06 | End: 2023-06-06 | Stop reason: SDUPTHER

## 2023-06-06 RX ORDER — LOSARTAN POTASSIUM 25 MG/1
25 TABLET ORAL DAILY
Qty: 90 TABLET | Refills: 3 | Status: SHIPPED | OUTPATIENT
Start: 2023-06-06 | End: 2023-07-05

## 2023-06-06 RX ORDER — ZOSTER VACCINE RECOMBINANT, ADJUVANTED 50 MCG/0.5
0.5 KIT INTRAMUSCULAR ONCE
Qty: 1 EACH | Refills: 1 | Status: SHIPPED | OUTPATIENT
Start: 2023-06-06 | End: 2023-06-06

## 2023-06-06 RX ORDER — IPRATROPIUM BROMIDE AND ALBUTEROL SULFATE 2.5; .5 MG/3ML; MG/3ML
3 SOLUTION RESPIRATORY (INHALATION) EVERY 6 HOURS PRN
Qty: 75 ML | Refills: 2 | Status: SHIPPED | OUTPATIENT
Start: 2023-06-06 | End: 2023-09-06 | Stop reason: SDUPTHER

## 2023-06-06 NOTE — TELEPHONE ENCOUNTER
----- Message from Kayli Dorantes MD sent at 6/6/2023  2:00 PM CDT -----  Please fax the most recent PSA level to Dr. Jono vang.  Also please cancel the referral sent to Dr. Fede Dominguez.

## 2023-06-06 NOTE — PROGRESS NOTES
Patient ID: [id number]     Chief Complaint: Medicare AWV        HPI:      Disclaimer:  This note is prepared using voice recognition software and as such is likely to have errors despite attempts at proofreading. Please contact me for questions.     Gustavo Lanza is a 77 y.o. male here today for a Medicare Wellness.     A separate E/M code has been provided to evaluate additional complaints that the patient would like addressed during the dedicated Medicare Wellness Exam.    The patient has following issues to discuss    Acute Issues-  Feels great.  Denies of any new concerns.  Needs medication refills.    Discussion of the blood work results  Increased frequency of urine   Was prescribed nitrofurantoin but is allergic nitrofurantoin as per patient   Most recent urinary culture demonstrates bacteria is less than 10,000 colony-forming units.  Patient also has leukocytosis but denies of any fever, burning urination, discharge, suprapubic pain or back pain, shortness O breath, chest pain, palpitations, dizziness, floaters, shortness Of breath.     Chronic Issues-  COPD (chronic obstructive pulmonary disease)  Is currently on albuterol p.r.n. along with ipratropium/albuterol  Reports it to be working and is stable   Never intubated never been hospitalized    BPH (benign prostatic hyperplasia)  PSA level elevated today   Currently seeing Dr. Jono Lam   We will fax the results to his office   Currently asymptomatic   Currently on Flomax 0.4 mg every day    Chronic idiopathic constipation  Currently using suppository every 2-3 days    Coronary artery disease involving native coronary artery of native   heart without angina pectoris  S/P AAA repair   Status post CABG almost 10 years ago   Currently  seeing Dr. Jagdish Kelly     Impaired fasting glucose  Hemoglobin A1c   Date Value Ref Range Status   05/31/2023 5.7 <=7.0 % Final     Mixed hyperlipidemia  Currently on Crestor 5 mg daily   Denies of leg  cramps    Raynaud disease  Peripheral vascular disease  Symptoms well-controlled without medications  Currently on statins but continues to smoke    Insomnia  Currently on trazodone 150 mg and would like to get a refill of the same.    Smoking   Has been smoking more than 25 years     Past Surgical History:   Procedure Laterality Date    CARDIAC SURGERY      COLONOSCOPY  11/12/2021    CARLOS MOHAMUD MD    REPAIR OF HEART VALVE         Review of patient's allergies indicates:  No Known Allergies    Outpatient Medications Marked as Taking for the 6/6/23 encounter (Office Visit) with Kayli Dorantes MD   Medication Sig Dispense Refill    albuterol (PROVENTIL/VENTOLIN HFA) 90 mcg/actuation inhaler Inhale 1-2 puffs into the lungs every 6 (six) hours as needed for Wheezing. Rescue 8 g 0    albuterol-ipratropium (DUO-NEB) 2.5 mg-0.5 mg/3 mL nebulizer solution Take 3 mLs by nebulization every 6 (six) hours as needed for Wheezing. Rescue 75 mL 0    amoxicillin-clavulanate 875-125mg (AUGMENTIN) 875-125 mg per tablet Take 1 tablet by mouth every 12 (twelve) hours. for 7 days 14 tablet 0    aspirin (ECOTRIN) 81 MG EC tablet Take 81 mg by mouth once daily.      carvediloL (COREG) 6.25 MG tablet Take 6.25 mg by mouth 2 (two) times daily.      levoFLOXacin (LEVAQUIN) 750 MG tablet Take 750 mg by mouth once daily.      predniSONE (DELTASONE) 50 MG Tab Take 1 tablet (50 mg total) by mouth once daily. for 5 days 5 tablet 0    rosuvastatin (CRESTOR) 5 MG tablet Take 1 tablet (5 mg total) by mouth nightly. 90 tablet 3    tamsulosin (FLOMAX) 0.4 mg Cap Take 1 capsule by mouth once daily.      traZODone (DESYREL) 150 MG tablet trazodone 150 mg tablet 30 tablet 0       Social History     Socioeconomic History    Marital status:    Tobacco Use    Smoking status: Every Day     Packs/day: 0.50     Types: Cigarettes     Passive exposure: Current    Smokeless tobacco: Current   Substance and Sexual Activity    Alcohol use: Never     Drug use: Never    Sexual activity: Yes        Family History   Problem Relation Age of Onset    No Known Problems Mother     No Known Problems Father     No Known Problems Sister     No Known Problems Brother         Patient Care Team:  Kayli Dorantes MD as PCP - General (Family Medicine)   Subjective:     ROS    See HPI for details    Constitutional: Denies Change in appetite. Denies Chills. Denies Fever. Denies Night sweats.  Respiratory: Denies Cough. Denies Shortness of breath. Denies Shortness of breath with exertion. Denies Wheezing.  Cardiovascular: DeniesChest pain at rest. Denies Chest pain with exertion. Denies Irregular heartbeat. Denies Palpitations. Denies Edema.  Gastrointestinal: Denies Abdominal pain. DeniesDiarrhea. Denies Nausea. Denies Vomiting. Denies Hematemesis or Hematochezia.  Genitourinary: Denies Dysuria. Denies Urinary frequency. Denies Urinary urgency. Denies Blood in urine.  Endocrine: Denies Cold intolerance. Denies Excessive thirst. Denies Heat intolerance. Denies Weight loss. Denies Weight gain.  Musculoskeletal: Denies Painful joints. Denies Weakness.  Integumentary: Denies Rash. Denies Itching. Denies Dry skin.  Neurologic: Denies Dizziness. Denies Fainting. Denies Headache.  Psychiatric: Denies Depression. Denies Anxiety. Denies Suicidal/Homicidal ideations.    All Other ROS: Negative except as stated in HPI.     Patient Reported Health Risk Assessments:  What is your age?: 70-79  Are you male or female?: Male  During the past four weeks, how much have you been bothered by emotional problems such as feeling anxious, depressed, irritable, sad, or downhearted and blue?: Not at all  During the past five weeks, has your physical and/or emotional health limited your social activities with family, friends, neighbors, or groups?: Not at all  During the past four weeks, how much bodily pain have you generally had?: No pain  During the past four weeks, was someone available to help if  you needed and wanted help?: Yes, as much as I wanted  During the past four weeks, what was the hardest physical activity you could do for at least two minutes?: Moderate  Can you get to places out of walking distance without help?  (For example, can you travel alone on buses or taxis, or drive your own car?): Yes  Can you go shopping for groceries or clothes without someone's help?: Yes  Can you prepare your own meals?: Yes  Can you do your own housework without help?: Yes  Because of any health problems, do you need the help of another person with your personal care needs such as eating, bathing, dressing, or getting around the house?: No  Can you handle your own money without help?: Yes  During the past four weeks, how would you rate your health in general?: Good  How have things been going for you during the past four weeks?: Pretty bad  Are you having difficulties driving your car?: Not applicable, I do not use a car (Rides motorcycle)  Do you always fasten your seat belt when you are in a car?: Yes, usually  How often in the past four weeks have you been bothered by falling or dizzy when standing up?: Often  How often in the past four weeks have you been bothered by sexual problems?: Never  How often in the past four weeks have you been bothered by trouble eating well?: Never  How often in the past four weeks have you been bothered by teeth or denture problems?: Never  How often in the past four weeks have you been bothered with problems using the telephone?: Never  How often in the past four weeks have you been bothered by tiredness or fatigue?: Sometimes  Have you fallen two or more times in the past year?: Yes  Are you afraid of falling?: No  Are you a smoker?: Yes, I'm not ready to quit  During the past four weeks, how many drinks of wine, beer, or other alcoholic beverages did you have?: No alcohol at all  Do you exercise for about 20 minutes three or more days a week?: No, I usually do not exercise this  "much  Have you been given any information to help you with hazards in your house that might hurt you?: No  Have you been given any information to help you with keeping track of your medications?: No  How often do you have trouble taking medicines the way you've been told to take them?: Sometimes I take them as prescribed  How confident are you that you can control and manage most of your health problems?: Very confident  What is your race? (Check all that apply.):     Objective:     /62 (BP Location: Right arm, Patient Position: Sitting, BP Method: Large (Manual))   Pulse 87   Temp 98.3 °F (36.8 °C) (Oral)   Resp 17   Ht 5' 6" (1.676 m)   Wt 55.4 kg (122 lb 3.2 oz)   SpO2 98%   BMI 19.72 kg/m²     Physical Exam    General: Alert and oriented, No acute distress.  Neck/Thyroid: Supple, Non-tender  Respiratory: Clear to auscultation bilaterally  Cardiovascular: Regular rate and rhythm  Gastrointestinal: Soft, Non-tender. No palpable organomegaly.  -no suprapubic tenderness/CVA  Musculoskeletal: Normal range of motion.  Neurologic: No focal deficits  Psychiatric: Normal interaction, Coherent speech, Euthymic mood, Appropriate affect       Assessment:       ICD-10-CM ICD-9-CM   1. Wellness examination  Z00.00 V70.0   2. Coronary artery disease involving native coronary artery of native heart without angina pectoris  I25.10 414.01   3. Mixed hyperlipidemia  E78.2 272.2   4. Benign prostatic hyperplasia with lower urinary tract symptoms, symptom details unspecified  N40.1 600.01   5. Chronic kidney disease, stage 3a  N18.31 585.3   6. Impaired fasting glucose  R73.01 790.21   7. Chronic idiopathic constipation  K59.04 564.00   8. Urinary tract infection without hematuria, site unspecified  N39.0 599.0   9. Prediabetes  R73.03 790.29   10. Microalbuminuria  R80.9 791.0   11. Increased prostate specific antigen (PSA) velocity  R97.20 790.93   12. Leukocytosis, unspecified type  D72.829 288.60 "     Plan:       Medicare Annual Wellness and Personalized Prevention Plan:     Fall Risk + Home Safety + Living Situation + Whisper Test + Depression Screen + CAGE + Cognitive Impairment Screen + ADL Screen + Timed Get Up and Go + Nutrition Screen + PAQ Screen + Health Risk Assessment all reviewed.     No flowsheet data found.  Fall Risk Assessment - Outpatient 6/6/2023 2/7/2023   Mobility Status Ambulatory Ambulatory   Number of falls 1 0   Identified as fall risk 0 0                   Depression Screening  Over the past two weeks, has the patient felt down, depressed, or hopeless?: No  Over the past two weeks, has the patient felt little interest or pleasure in doing things?: No  Functional Ability/Safety Screening  Was the patient's timed Up & Go test unsteady or longer than 30 seconds?: No  Does the patient need help with phone, transportation, shopping, preparing meals, housework, laundry, meds, or managing money?: No  Does the patient's home have rugs in the hallway, lack grab bars in the bathroom, lack handrails on the stairs or have poor lighting?: No  Have you noticed any hearing difficulties?: No  Cognitive Function (Assessed through direct observation with due consideration of information obtained by way of patient reports and/or concerns raised by family, friends, caretakers, or others)    Does the patient repeat questions/statements in the same day?: No  Does the patient have trouble remembering the date, year, and time?: No  Does the patient have difficulty managing finances?: No  Does the patient have a decreased sense of direction?: No         Alcohol/Tobacco Use - Stressed importance of smoking cessation AND DENIES OF alcohol intake  CVD Risk Factors -   The 10-year ASCVD risk score (Daquan DUTTON, et al., 2019) is: 20%    Values used to calculate the score:      Age: 77 years      Sex: Male      Is Non- : No      Diabetic: No      Tobacco smoker: Yes      Systolic Blood  Pressure: 106 mmHg      Is BP treated: No      HDL Cholesterol: 41 mg/dL      Total Cholesterol: 166 mg/dL   Obesity/Physical Activity - Normal BMI. Encouraged daily 30 minute physical activity x 5 days per week.    Opioid Screening: Patient medication list reviewed, patient is not taking prescription opioids. Patient is not using additional opioids than prescribed. Patient is not at low risk of substance abuse based on this opioid use history.     Advance Directives:   Living Will: No        Oral Declaration: No    [place]: No    Do Not Resuscitate: Full Code. [place] Lanza ( ex wife) - [phone].      Decision Making:  Patient answered questions  Goals of Care: The patient endorses that what is most important right now is to focus on spending time on travelling     Accordingly, we have decided that the best plan to meet the patient's goals includes continuing with treatment  Advance Care Planning   I attest to discussing Advance Care Planning with patient and/or family member.  Education was provided including the importance of the Health Care Power of , Advance Directives, and/or [place] documentation.  The patient expressed understanding to the importance of this information and discussion.  Goals of Care: The patient expressed that what is most important right now is to focus on:   Length of ACP conversation in minutes: 5 min       Health Maintenance Topics with due status: Not Due       Topic Last Completion Date    Colorectal Cancer Screening 11/12/2021    Lipid Panel 08/08/2022    Hemoglobin A1c (Prediabetes) 05/31/2023    High Dose Statin 06/06/2023    Aspirin/Antiplatelet Therapy 06/06/2023          Vaccinations -   Immunization History   Administered Date(s) Administered    COVID-19, MRNA, LN-S, PF (Pfizer) (Purple Cap) 02/25/2021, 03/25/2021, 09/25/2021    COVID-19, mRNA, LNP-S, bivalent booster, PF (PFIZER OMICRON) 11/29/2022    Influenza - High Dose - PF (65 years and older) 01/23/2020     Influenza - Quadrivalent - PF *Preferred* (6 months and older) 01/23/2020, 11/18/2021, 11/29/2022    Pneumococcal Conjugate - 13 Valent 11/02/2016    Pneumococcal Polysaccharide - 23 Valent 03/05/2018        1. Wellness examination  AAA Screening -  (65-75) s/p repair  Lung Cancer-(50-80) Smoker/ Ex smoker-   Prostate Cancer Screening (50-74)- PSA level elevated. Will fax the results to urologist  Colon Cancer Screening(45-75) - Colonoscopy aged out  Osteoporosis Screening(>65) - declined  Eye Exam - Last Eye Exam   Dental Exam - Recommend biannually    Diet discussed (healthy food choices, reduce portions and overall calorie intake)  Exercise 30-45 minutes 5x per week  Avoid excessive alcohol and tobacco if taking  Immunizations dicussed  Monthly testicular exam recommended  Preventative exams discussed.    2. Coronary artery disease involving native coronary artery of native heart without angina pectoris  Status post CABG   Recommend to continue statins as prescribed along  Recommend to keep goal A1c less than 7, goal blood pressure less than 140/90   Recommend to start dash diet along with 35 minutes of brisk walking     3. Leukocytosis, unspecified type  Likely secondary to UTI   Currently asymptomatic   We will repeat a CBC after the completion of antibiotics for UTI treatment    4. Urinary tract infection without hematuria, site unspecified  - amoxicillin-clavulanate 875-125mg (AUGMENTIN) 875-125 mg per tablet; Take 1 tablet by mouth every 12 (twelve) hours. for 7 days  Dispense: 14 tablet; Refill: 0  - CBC Auto Differential; Future  - Urine culture; Future  -start Augmentin as prescribed   -patient allergic to nitrofurantoin (documented in the chart and added it to the allergy list)  ER precautions provided    5. Prediabetes  -start Farxiga as prescribed  - dapagliflozin (FARXIGA) 10 mg tablet; Take 1 tablet (10 mg total) by mouth once daily.  Dispense: 90 tablet; Refill: 0    6. Mixed  hyperlipidemia  -continue statins as prescribed  - Lipid Panel; Future    7. Microalbuminuria  Start losartan as prescribed  - losartan (COZAAR) 25 MG tablet; Take 1 tablet (25 mg total) by mouth once daily.  Dispense: 90 tablet; Refill: 3    8. Benign prostatic hyperplasia with lower urinary tract symptoms, symptom details unspecified  9. PSA elevation  Continue Flomax as prescribed   Will fax the PSA results to Dr. Sixto Lam  Defer management to the urology     10. Chronic obstructive pulmonary disease, unspecified COPD type  -start inhalers as prescribed  - albuterol (PROVENTIL/VENTOLIN HFA) 90 mcg/actuation inhaler; Inhale 1-2 puffs into the lungs every 6 (six) hours as needed for Wheezing. Rescue  Dispense: 8 g; Refill: 2  - albuterol-ipratropium (DUO-NEB) 2.5 mg-0.5 mg/3 mL nebulizer solution; Take 3 mLs by nebulization every 6 (six) hours as needed for Wheezing. Rescue  Dispense: 75 mL; Refill: 2    11. Chronic idiopathic constipation  Recommend to include green vegetables/roughage in diet   Increase water intake and prune juice   Continue suppository to avoid constipation    12. Chronic kidney disease, stage 3a  - US Retroperitoneal Complete; Future  - Ambulatory referral/consult to Nephrology; Future  Avoid NSAIDs (Aleve, Mobic, Celebrex, Ibuprofen, Advil, Toradol and Diclofenac).  Only take tylenol occasionally if needed for aches/pains.  Educated on low sodium diet:  Avoid high salt foods (olives, pickles, smoked meats, deli meats, salted potato chips, etc.).   Do not add salt to your food at the table.   Use only small amounts of salt when cooking.   Recommended Daily Protein Intake for chronic kidney disease:  0.8 g/kg   Avoid excessive intake of high potassium foods  Bananas, oranges, watermelon, tomatoes, potatoes, or salt substitutes  Estimated Creatinine Clearance: 38.5 mL/min (A) (based on SCr of 1.3 mg/dL (H)).  Control DM with goal A1C <7. BP goal <140/90. LDL goal < 70  Smoking Cessation  recommended if smoking    13. Sleeping difficulties  - traZODone (DESYREL) 150 MG tablet; Take 1 tablet (150 mg total) by mouth nightly. trazodone 150 mg tablet  Dispense: 90 tablet; Refill: 0  -maintain sleep hygiene    14. Raynaud's disease without gangrene  Currently not on medications   Avoid cold  We will start amlodipine/calcium channel blocker in the next visit if patient agrees    15. Need for shingles vaccine  - varicella-zoster [id number], PF, (SHINGRIX, PF,) 50 mcg/0.5 mL injection; Inject 0.5 mLs into the muscle once. Repeat dose x 1 in 2-6 months for 1 dose  Dispense: 1 each; Refill: 1    16. Need for tetanus, diphtheria, and acellular pertussis (Tdap) vaccine  - DIPH,PERTUSS,ACEL,,TET VAC,PF, ADULT (ADACEL) 2 Lf-(2.5-5-3-5 mcg)-5Lf/0.5 mL Syrg; Inject 0.5 mLs into the muscle once. for 1 dose  Dispense: 0.5 mL; Refill: 0    17. Peripheral vascular disease, unspecified  Recommend to stop smoking   Continue atorvastatin   We will hold cilostazol as of now    18. Encounter for screening for lung cancer  - CT Chest Lung Screening Low Dose; Future    19. Nicotine dependence, cigarettes, uncomplicated  - CT Chest Lung Screening Low Dose; Future  Discussed the risk of Smoking causing cancer for > 3 min  Offered Smoking cessation program. Patient may consider smoking cessation but declined as of now.   Patient voiced understanding the risks of smoking program in future.      Medication List with Changes/Refills   Current Medications    ALBUTEROL (PROVENTIL/VENTOLIN HFA) 90 MCG/ACTUATION INHALER    Inhale 1-2 puffs into the lungs every 6 (six) hours as needed for Wheezing. Rescue       Start Date: 6/4/2023  End Date: --    ALBUTEROL-IPRATROPIUM (DUO-NEB) 2.5 MG-0.5 MG/3 ML NEBULIZER SOLUTION    Take 3 mLs by nebulization every 6 (six) hours as needed for Wheezing. Rescue       Start Date: 6/4/2023  End Date: 6/3/2024    AMOXICILLIN-CLAVULANATE 875-125MG (AUGMENTIN) 875-125 MG PER TABLET    Take 1 tablet by mouth  every 12 (twelve) hours. for 7 days       Start Date: 6/6/2023  End Date: 6/13/2023    ASPIRIN (ECOTRIN) 81 MG EC TABLET    Take 81 mg by mouth once daily.       Start Date: --        End Date: --    CARVEDILOL (COREG) 6.25 MG TABLET    Take 6.25 mg by mouth 2 (two) times daily.       Start Date: 11/29/2021End Date: --    DOXEPIN (SINEQUAN) 10 MG CAPSULE    doxepin 10 mg capsule       Start Date: 6/2/2022  End Date: --    HYDROCODONE-ACETAMINOPHEN (NORCO) 7.5-325 MG PER TABLET    hydrocodone 7.5 mg-acetaminophen 325 mg tablet       Start Date: --        End Date: --    LEVOFLOXACIN (LEVAQUIN) 750 MG TABLET    Take 750 mg by mouth once daily.       Start Date: --        End Date: --    LINACLOTIDE (LINZESS) 72 MCG CAP CAPSULE    Take 1 capsule (72 mcg total) by mouth before breakfast.       Start Date: 8/3/2022  End Date: --    NAPROXEN (NAPROSYN) 500 MG TABLET    Take 1 tablet (500 mg total) by mouth 2 (two) times daily with meals.       Start Date: 7/11/2022 End Date: --    PREDNISONE (DELTASONE) 50 MG TAB    Take 1 tablet (50 mg total) by mouth once daily. for 5 days       Start Date: 6/5/2023  End Date: 6/10/2023    ROSUVASTATIN (CRESTOR) 5 MG TABLET    Take 1 tablet (5 mg total) by mouth nightly.       Start Date: 4/6/2023  End Date: --    TAMSULOSIN (FLOMAX) 0.4 MG CAP    Take 1 capsule by mouth once daily.       Start Date: 5/24/2022 End Date: --    TIOTROPIUM-OLODATEROL (STIOLTO RESPIMAT) 2.5-2.5 MCG/ACTUATION MIST    Inhale 2 puffs into the lungs daily as needed.       Start Date: 10/27/2022End Date: --    TRAZODONE (DESYREL) 150 MG TABLET    trazodone 150 mg tablet       Start Date: 5/23/2023 End Date: --    TRIAMCINOLONE (KENALOG) 0.5 % OINTMENT    Apply topically 2 (two) times daily. for 7 days       Start Date: 5/2/2023  End Date: 5/9/2023          The patient's Health Maintenance was reviewed and the following appears to be due at this time:   Health Maintenance Due   Topic Date Due    Hepatitis C  Screening  Never done    TETANUS VACCINE  Never done    Shingles Vaccine (1 of 2) Never done    COVID-19 Vaccine (5 - Pfizer series) 03/29/2023         Follow up in about 3 months (around 9/6/2023) for Follow Up follow prediabetes/CAD/CKD/hyperlipidemia.   In addition to their scheduled follow up, the patient has also been instructed to follow up on as needed basis.     Provided patient with a 5-10 year written screening schedule and personal prevention plan. Recommendations were developed using the USPSTF age appropriate recommendations. Education, counseling, and referrals were provided as needed. After Visit Summary printed and given to patient which includes a list of additional screenings\tests needed.

## 2023-06-19 ENCOUNTER — HOSPITAL ENCOUNTER (OUTPATIENT)
Dept: RADIOLOGY | Facility: HOSPITAL | Age: 78
Discharge: HOME | End: 2023-06-19
Attending: STUDENT IN AN ORGANIZED HEALTH CARE EDUCATION/TRAINING PROGRAM
Payer: MEDICARE

## 2023-06-19 DIAGNOSIS — Z12.2 ENCOUNTER FOR SCREENING FOR LUNG CANCER: ICD-10-CM

## 2023-06-19 DIAGNOSIS — F17.210 NICOTINE DEPENDENCE, CIGARETTES, UNCOMPLICATED: ICD-10-CM

## 2023-06-19 PROCEDURE — 71271 CT THORAX LUNG CANCER SCR C-: CPT | Mod: TC

## 2023-06-20 ENCOUNTER — HOSPITAL ENCOUNTER (OUTPATIENT)
Dept: RADIOLOGY | Facility: HOSPITAL | Age: 78
Discharge: HOME | End: 2023-06-20
Attending: STUDENT IN AN ORGANIZED HEALTH CARE EDUCATION/TRAINING PROGRAM
Payer: MEDICARE

## 2023-06-20 DIAGNOSIS — R91.1 SOLITARY PULMONARY NODULE: Primary | ICD-10-CM

## 2023-06-20 DIAGNOSIS — F17.218 NICOTINE DEPENDENCE, CIGARETTES, WITH OTHER NICOTINE-INDUCED DISORDERS: ICD-10-CM

## 2023-06-20 DIAGNOSIS — I71.23 ANEURYSM OF DESCENDING THORACIC AORTA WITHOUT RUPTURE: ICD-10-CM

## 2023-06-20 DIAGNOSIS — N18.31 CHRONIC KIDNEY DISEASE, STAGE 3A: ICD-10-CM

## 2023-06-20 PROCEDURE — 76770 US EXAM ABDO BACK WALL COMP: CPT | Mod: TC

## 2023-06-20 NOTE — PROGRESS NOTES
Please inform patient of lab results.    1. Lung-RADS Category:  3 - Probably Benign-3  month LDCT follow-up is recommended..  Avoid/ stop smoking if smoking  Continue inhalers as prescribed  Repeat Ct orders are in the computer in 3 months    2. 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 Luna   Please make sure that we fax the referral asap . Please dayna after they accept the referral. Thanks  Please make sure that the patient does not miss his appointment as it could be life threatening       Thanks,    Dr. Dorantes

## 2023-06-21 DIAGNOSIS — N39.0 URINARY TRACT INFECTION WITHOUT HEMATURIA, SITE UNSPECIFIED: Primary | ICD-10-CM

## 2023-06-21 DIAGNOSIS — D64.9 ANEMIA, UNSPECIFIED TYPE: ICD-10-CM

## 2023-06-21 RX ORDER — SULFAMETHOXAZOLE AND TRIMETHOPRIM 800; 160 MG/1; MG/1
1 TABLET ORAL 2 TIMES DAILY
Qty: 14 TABLET | Refills: 0 | Status: SHIPPED | OUTPATIENT
Start: 2023-06-21 | End: 2023-06-22

## 2023-06-21 NOTE — PROGRESS NOTES
Please inform patient of Imaging results    1. Prostatomegaly-  Please find out with the patient if he has a urologist or any preferred urologist.  If not then we will send a referral to Dr. Fede Dominguez    2.  Normal size, nonobstructed kidneys        Incidental left renal cyst which requires no imaging follow-up      Thanks,    Dr. Dorantes

## 2023-06-22 ENCOUNTER — TELEPHONE (OUTPATIENT)
Dept: FAMILY MEDICINE | Facility: CLINIC | Age: 78
End: 2023-06-22
Payer: MEDICARE

## 2023-06-22 DIAGNOSIS — N39.0 URINARY TRACT INFECTION WITHOUT HEMATURIA, SITE UNSPECIFIED: Primary | ICD-10-CM

## 2023-06-22 RX ORDER — AMOXICILLIN AND CLAVULANATE POTASSIUM 875; 125 MG/1; MG/1
1 TABLET, FILM COATED ORAL EVERY 12 HOURS
Qty: 14 TABLET | Refills: 0 | Status: SHIPPED | OUTPATIENT
Start: 2023-06-22 | End: 2023-06-29

## 2023-06-22 NOTE — TELEPHONE ENCOUNTER
Called Dr. Gabriela Luna's office to inquire about patient's referral status.    Representative/Nurse said patient is scheduled on June 29th @ 9:00 AM. I voiced my understanding.

## 2023-06-23 ENCOUNTER — TELEPHONE (OUTPATIENT)
Dept: FAMILY MEDICINE | Facility: CLINIC | Age: 78
End: 2023-06-23
Payer: MEDICARE

## 2023-06-23 DIAGNOSIS — N40.0 BENIGN PROSTATIC HYPERPLASIA WITHOUT LOWER URINARY TRACT SYMPTOMS: Primary | ICD-10-CM

## 2023-06-23 NOTE — TELEPHONE ENCOUNTER
----- Message from Kayli Dorantes MD sent at 6/22/2023  9:52 AM CDT -----  Please inform patient of lab results.  Patient has UTI    1. E coli is sensitive to Augmentin.  Discontinue Bactrim (trimethoprim/sulfamethoxazole).  Start Augmentin as prescribed      Thanks,    Dr. Dorantes

## 2023-06-23 NOTE — TELEPHONE ENCOUNTER
----- Message from Kayli Dorantes MD sent at 6/21/2023  9:25 AM CDT -----  Please inform patient of Imaging results    1. Prostatomegaly-  Please find out with the patient if he has a urologist or any preferred urologist.  If not then we will send a referral to Dr. Fede Dominguez    2.  Normal size, nonobstructed kidneys        Incidental left renal cyst which requires no imaging follow-up      Thanks,    Dr. Dorantes

## 2023-06-23 NOTE — TELEPHONE ENCOUNTER
----- Message from Kayli Dorantes MD sent at 6/21/2023  9:30 AM CDT -----  Please inform patient of lab results.    1. Urinary tract infection   Start and Complete full course of antibiotics as prescribed.  C/S results shows ecoli   Report any continuing signs such as nausea/vomiting, low back pain, blood in urine, burning with urination after antibiotic completion, or fever.  Drink plenty of water daily. Avoid soda.  Urinate frequently. Do not hold urine for extended periods of time.    Wear cotton underwear and avoid tight fitting pants.   Use OTC AZO for relief of urinary spasms.    2. Drop in hemoglobin  Please confirm if the patient has any blood in bowel, urine or from any where  Also has blood work for dropped hemoglobin  in computer    3. Other labwork within acceptable ranges.    Thanks,    Dr. Dorantes

## 2023-06-28 NOTE — PROGRESS NOTES
[facility] Vascular - Clinic Note  Gabriela Luna MD      Patient Name: Gustavo Lanza                   : 1945      MRN: [id number]   Visit Date: 2023       History Present Illness     Reason for Visit: Aortic Aneurysm      Mr. Lanza was referred to the clinic secondary to recent abnormal findings on recent CT chest suggestive of aneurysmal dilation of his aorta. He reports previous repair of abdominal aortic aneurysm by Dr. Ovalles in  but states he hasn't undergone regular surveillance of the aneurysm since that surgery.  He is a current smoker.    He reports SOB s/t COPD. Reports constipation for last 2.5 years and when that happens he reports lower back pain. Reports tingling sensation intermittently to bilateral lower extremities. He reports that he has to kneel down when this occurs. Reports he fell twice yesterday.    He is able to walk at least a mile.         REVIEW OF SYSTEMS:  ROS  12 point review of systems conducted, negative except as stated in the history of present illness. See HPI for details.        Physical Exam      Visit Vitals  /64 (BP Location: Left arm)   Pulse 81         General: no acute distress and thin, ambulating normally, alert, pleasant, and conversant  Neurologic: cranial nerves are grossly intact, no neurologic deficits  HEENT: grossly normal and no scleral icterus  Neck/Chest: normal , soft without lymphadenopathy, and palpable carotid pulses bilaterally  Respiratory: breathing easily and without respiratory distress  Abdomen: normal, soft, and no palpable masses  Cardiology: regular rate and rhythm  Skin:  No obvious skin abnormalities;  tattoos to all extremities;   Upper Extremity Arterial Exam:   Right - radial is palpable  Left - radial is palpable    Lower Extremity Arterial Exam:  Right - posterior tibial is non-palpable and dorsalis pedis is lightly palpable  Left - posterior tibial is non-palpable and dorsalis pedis is  non-palpable    Musculoskeletal:     Lower Extremity: no edema present to bilateral lower extremities          Assessment and Plan     Mr. Lanza is a 77 y.o. with recent findings of aneurysmal dilation of the descending thoracic aorta. I personally reviewed the images from his recent CT chest.  There is an approximately 4.1 cm descending aortic aneurysm.   He also has a history of an abdominal aortic aneurysm and is s/p endovascular aortic aneurysm repair in 2020 but did not maintain normal post-operative surveillance of his abdominal aneurysm.   I did review his CT of the abdomen and pelvis demonstrating presence of an endograft.  He needs to resume surveillance of his EVAR.   His thoracic aneurysm is not large enough to make procedural intervention beneficial.     Recommend 1 year CT of chest and an abdominal aortic duplex for surveillance of his abdominal aortic repair.   If there are no issues on that imaging, recommend maintaining annual surveillance of his aneurysm with aorta duplex.      We discussed the detrimental effects of tobacco use on the vascular system and overall health.   There was an emphasis on achieving complete cessation.       1. Infrarenal abdominal aortic aneurysm (AAA) without rupture  - CV Abdominal Aorta; Future  - CV Abdominal Aorta; Future    2. S/P AAA repair    3. Aneurysm of descending thoracic aorta without rupture  - Ambulatory referral/consult to Vascular Surgery           Imaging Obtained/Reviewed   Study:  CT chest  Date:           Medical History     Past Medical History:   Diagnosis Date    Atherosclerosis of arteries of extremities     BPH (benign prostatic hyperplasia) 08/03/2022    Carotid artery stenosis     Chronic idiopathic constipation 08/03/2022    COPD (chronic obstructive pulmonary disease)     Coronary artery disease involving native coronary artery of native heart without angina pectoris 08/03/2022    Impaired fasting glucose 08/03/2022    Mixed hyperlipidemia  08/03/2022    Raynaud disease 08/03/2022    S/P AAA repair 08/03/2022     Past Surgical History:   Procedure Laterality Date    ABDOMINAL AORTIC ANEURYSM REPAIR  03/13/2020    Surgeon: Dr. Ovalles    CARDIAC SURGERY  01/30/2014    CABG    COLONOSCOPY  11/12/2021    CARLOS MOHAMUD MD    HERNIA REPAIR Left 03/23/2016    PERIPHERAL ANGIOGRAPHY  12/18/2015    Femoral Popliteal revas w/stent    REPAIR OF HEART VALVE       Family History   Problem Relation Age of Onset    No Known Problems Mother     No Known Problems Father     No Known Problems Sister     No Known Problems Brother      Social History     Socioeconomic History    Marital status:    Tobacco Use    Smoking status: Every Day     Packs/day: 0.50     Types: Cigarettes     Passive exposure: Current    Smokeless tobacco: Never   Substance and Sexual Activity    Alcohol use: Never    Drug use: Never    Sexual activity: Yes     Current Outpatient Medications   Medication Instructions    albuterol (PROVENTIL/VENTOLIN HFA) 90 mcg/actuation inhaler 1-2 puffs, Inhalation, Every 6 hours PRN, Rescue    albuterol-ipratropium (DUO-NEB) 2.5 mg-0.5 mg/3 mL nebulizer solution 3 mLs, Nebulization, Every 6 hours PRN, Rescue    aspirin (ECOTRIN) 81 mg, Oral, Daily    carvediloL (COREG) 6.25 mg, Oral, 2 times daily    doxepin (SINEQUAN) 10 MG capsule doxepin 10 mg capsule    FARXIGA 10 mg, Oral, Daily    losartan (COZAAR) 25 mg, Oral, Daily    naproxen (NAPROSYN) 500 mg, Oral, 2 times daily with meals    rosuvastatin (CRESTOR) 5 mg, Oral, Nightly    tamsulosin (FLOMAX) 0.4 mg Cap 1 capsule, Oral, Daily    traZODone (DESYREL) 150 mg, Oral, Nightly, trazodone 150 mg tablet    triamcinolone (KENALOG) 0.5 % ointment Topical (Top), 2 times daily     Review of patient's allergies indicates:   Allergen Reactions    Nitrofurantoin monohyd/m-cryst        Patient Care Team:  Kayli Dorantes MD as PCP - General (Family Medicine)        No follow-ups on file. In addition to their  scheduled follow up, the patient has also been instructed to follow up on as needed basis.     Future Appointments   Date Time Provider Department Center   7/5/2023  1:45 PM Silva Burgess MD [facility]   9/6/2023  2:00 PM MD J[facility] Schumacher    6/11/2024  2:00 PM MD JOCELYNE Schumacher

## 2023-06-29 ENCOUNTER — OFFICE VISIT (OUTPATIENT)
Dept: VASCULAR SURGERY | Facility: CLINIC | Age: 78
End: 2023-06-29
Payer: MEDICARE

## 2023-06-29 VITALS — DIASTOLIC BLOOD PRESSURE: 64 MMHG | HEART RATE: 81 BPM | SYSTOLIC BLOOD PRESSURE: 104 MMHG

## 2023-06-29 DIAGNOSIS — I71.43 INFRARENAL ABDOMINAL AORTIC ANEURYSM (AAA) WITHOUT RUPTURE: Primary | ICD-10-CM

## 2023-06-29 DIAGNOSIS — Z86.79 S/P AAA REPAIR: Chronic | ICD-10-CM

## 2023-06-29 DIAGNOSIS — I71.23 ANEURYSM OF DESCENDING THORACIC AORTA WITHOUT RUPTURE: ICD-10-CM

## 2023-06-29 DIAGNOSIS — Z98.890 S/P AAA REPAIR: Chronic | ICD-10-CM

## 2023-06-29 PROCEDURE — 3074F PR MOST RECENT SYSTOLIC BLOOD PRESSURE < 130 MM HG: ICD-10-PCS | Mod: CPTII,,, | Performed by: SPECIALIST

## 2023-06-29 PROCEDURE — 3074F SYST BP LT 130 MM HG: CPT | Mod: CPTII,,, | Performed by: SPECIALIST

## 2023-06-29 PROCEDURE — 99203 OFFICE O/P NEW LOW 30 MIN: CPT | Mod: ,,, | Performed by: SPECIALIST

## 2023-06-29 PROCEDURE — 3288F PR FALLS RISK ASSESSMENT DOCUMENTED: ICD-10-PCS | Mod: CPTII,,, | Performed by: SPECIALIST

## 2023-06-29 PROCEDURE — 3288F FALL RISK ASSESSMENT DOCD: CPT | Mod: CPTII,,, | Performed by: SPECIALIST

## 2023-06-29 PROCEDURE — 3078F DIAST BP <80 MM HG: CPT | Mod: CPTII,,, | Performed by: SPECIALIST

## 2023-06-29 PROCEDURE — 1160F PR REVIEW ALL MEDS BY PRESCRIBER/CLIN PHARMACIST DOCUMENTED: ICD-10-PCS | Mod: CPTII,,, | Performed by: SPECIALIST

## 2023-06-29 PROCEDURE — 1100F PTFALLS ASSESS-DOCD GE2>/YR: CPT | Mod: CPTII,,, | Performed by: SPECIALIST

## 2023-06-29 PROCEDURE — 1159F PR MEDICATION LIST DOCUMENTED IN MEDICAL RECORD: ICD-10-PCS | Mod: CPTII,,, | Performed by: SPECIALIST

## 2023-06-29 PROCEDURE — 3078F PR MOST RECENT DIASTOLIC BLOOD PRESSURE < 80 MM HG: ICD-10-PCS | Mod: CPTII,,, | Performed by: SPECIALIST

## 2023-06-29 PROCEDURE — 1160F RVW MEDS BY RX/DR IN RCRD: CPT | Mod: CPTII,,, | Performed by: SPECIALIST

## 2023-06-29 PROCEDURE — 1100F PR PT FALLS ASSESS DOC 2+ FALLS/FALL W/INJURY/YR: ICD-10-PCS | Mod: CPTII,,, | Performed by: SPECIALIST

## 2023-06-29 PROCEDURE — 99203 PR OFFICE/OUTPT VISIT, NEW, LEVL III, 30-44 MIN: ICD-10-PCS | Mod: ,,, | Performed by: SPECIALIST

## 2023-06-29 PROCEDURE — 1159F MED LIST DOCD IN RCRD: CPT | Mod: CPTII,,, | Performed by: SPECIALIST

## 2023-07-05 ENCOUNTER — OFFICE VISIT (OUTPATIENT)
Dept: NEPHROLOGY | Facility: CLINIC | Age: 78
End: 2023-07-05
Payer: MEDICARE

## 2023-07-05 VITALS
TEMPERATURE: 98 F | HEIGHT: 66 IN | DIASTOLIC BLOOD PRESSURE: 78 MMHG | BODY MASS INDEX: 19.6 KG/M2 | WEIGHT: 121.94 LBS | HEART RATE: 91 BPM | SYSTOLIC BLOOD PRESSURE: 134 MMHG | RESPIRATION RATE: 20 BRPM | OXYGEN SATURATION: 95 %

## 2023-07-05 DIAGNOSIS — N18.31 STAGE 3A CHRONIC KIDNEY DISEASE: Primary | ICD-10-CM

## 2023-07-05 DIAGNOSIS — N18.31 CHRONIC KIDNEY DISEASE, STAGE 3A: ICD-10-CM

## 2023-07-05 PROCEDURE — 1159F PR MEDICATION LIST DOCUMENTED IN MEDICAL RECORD: ICD-10-PCS | Mod: CPTII,S$GLB,, | Performed by: INTERNAL MEDICINE

## 2023-07-05 PROCEDURE — 3078F PR MOST RECENT DIASTOLIC BLOOD PRESSURE < 80 MM HG: ICD-10-PCS | Mod: CPTII,S$GLB,, | Performed by: INTERNAL MEDICINE

## 2023-07-05 PROCEDURE — 1159F MED LIST DOCD IN RCRD: CPT | Mod: CPTII,S$GLB,, | Performed by: INTERNAL MEDICINE

## 2023-07-05 PROCEDURE — 99203 PR OFFICE/OUTPT VISIT, NEW, LEVL III, 30-44 MIN: ICD-10-PCS | Mod: S$GLB,,, | Performed by: INTERNAL MEDICINE

## 2023-07-05 PROCEDURE — 99999 PR PBB SHADOW E&M-EST. PATIENT-LVL IV: CPT | Mod: PBBFAC,,, | Performed by: INTERNAL MEDICINE

## 2023-07-05 PROCEDURE — 3288F FALL RISK ASSESSMENT DOCD: CPT | Mod: CPTII,S$GLB,, | Performed by: INTERNAL MEDICINE

## 2023-07-05 PROCEDURE — 3078F DIAST BP <80 MM HG: CPT | Mod: CPTII,S$GLB,, | Performed by: INTERNAL MEDICINE

## 2023-07-05 PROCEDURE — 1101F PT FALLS ASSESS-DOCD LE1/YR: CPT | Mod: CPTII,S$GLB,, | Performed by: INTERNAL MEDICINE

## 2023-07-05 PROCEDURE — 3075F PR MOST RECENT SYSTOLIC BLOOD PRESS GE 130-139MM HG: ICD-10-PCS | Mod: CPTII,S$GLB,, | Performed by: INTERNAL MEDICINE

## 2023-07-05 PROCEDURE — 1101F PR PT FALLS ASSESS DOC 0-1 FALLS W/OUT INJ PAST YR: ICD-10-PCS | Mod: CPTII,S$GLB,, | Performed by: INTERNAL MEDICINE

## 2023-07-05 PROCEDURE — 1125F PR PAIN SEVERITY QUANTIFIED, PAIN PRESENT: ICD-10-PCS | Mod: CPTII,S$GLB,, | Performed by: INTERNAL MEDICINE

## 2023-07-05 PROCEDURE — 3075F SYST BP GE 130 - 139MM HG: CPT | Mod: CPTII,S$GLB,, | Performed by: INTERNAL MEDICINE

## 2023-07-05 PROCEDURE — 1125F AMNT PAIN NOTED PAIN PRSNT: CPT | Mod: CPTII,S$GLB,, | Performed by: INTERNAL MEDICINE

## 2023-07-05 PROCEDURE — 99203 OFFICE O/P NEW LOW 30 MIN: CPT | Mod: S$GLB,,, | Performed by: INTERNAL MEDICINE

## 2023-07-05 PROCEDURE — 3288F PR FALLS RISK ASSESSMENT DOCUMENTED: ICD-10-PCS | Mod: CPTII,S$GLB,, | Performed by: INTERNAL MEDICINE

## 2023-07-05 PROCEDURE — 99999 PR PBB SHADOW E&M-EST. PATIENT-LVL IV: ICD-10-PCS | Mod: PBBFAC,,, | Performed by: INTERNAL MEDICINE
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
## 2023-07-06 ENCOUNTER — TELEPHONE (OUTPATIENT)
Dept: FAMILY MEDICINE | Facility: CLINIC | Age: 78
End: 2023-07-06
Payer: MEDICARE

## 2023-07-06 NOTE — TELEPHONE ENCOUNTER
----- Message from Kayli Dorantes MD sent at 7/6/2023  1:28 PM CDT -----  Please schedule a visit with me in 3 weeks for Dizziness

## 2023-07-18 ENCOUNTER — TELEPHONE (OUTPATIENT)
Dept: VASCULAR SURGERY | Facility: CLINIC | Age: 78
End: 2023-07-18
Payer: MEDICARE

## 2023-07-19 ENCOUNTER — TELEPHONE (OUTPATIENT)
Dept: VASCULAR SURGERY | Facility: CLINIC | Age: 78
End: 2023-07-19
Payer: MEDICARE

## 2023-07-19 NOTE — TELEPHONE ENCOUNTER
Carla Saldana reviewed arota duplex with Dr. Luna. Patient will follow up in 1 year with [id number] and chest CT. Patient notified of results and advised to call with any questions or concerns. Follow up appointment reminder given to Darlene Severino.

## 2023-08-14 ENCOUNTER — OFFICE VISIT (OUTPATIENT)
Dept: FAMILY MEDICINE | Facility: CLINIC | Age: 78
End: 2023-08-14
Payer: MEDICARE

## 2023-08-14 VITALS — HEART RATE: 83 BPM | SYSTOLIC BLOOD PRESSURE: 110 MMHG | DIASTOLIC BLOOD PRESSURE: 62 MMHG | TEMPERATURE: 98 F

## 2023-08-14 DIAGNOSIS — K59.00 CONSTIPATION, UNSPECIFIED CONSTIPATION TYPE: Primary | ICD-10-CM

## 2023-08-14 DIAGNOSIS — Z12.11 ENCOUNTER FOR SCREENING FOR MALIGNANT NEOPLASM OF COLON: ICD-10-CM

## 2023-08-14 PROCEDURE — 1101F PR PT FALLS ASSESS DOC 0-1 FALLS W/OUT INJ PAST YR: ICD-10-PCS | Mod: CPTII,,, | Performed by: STUDENT IN AN ORGANIZED HEALTH CARE EDUCATION/TRAINING PROGRAM

## 2023-08-14 PROCEDURE — 3044F PR MOST RECENT HEMOGLOBIN A1C LEVEL <7.0%: ICD-10-PCS | Mod: CPTII,,, | Performed by: STUDENT IN AN ORGANIZED HEALTH CARE EDUCATION/TRAINING PROGRAM

## 2023-08-14 PROCEDURE — 3078F PR MOST RECENT DIASTOLIC BLOOD PRESSURE < 80 MM HG: ICD-10-PCS | Mod: CPTII,,, | Performed by: STUDENT IN AN ORGANIZED HEALTH CARE EDUCATION/TRAINING PROGRAM

## 2023-08-14 PROCEDURE — 1159F MED LIST DOCD IN RCRD: CPT | Mod: CPTII,,, | Performed by: STUDENT IN AN ORGANIZED HEALTH CARE EDUCATION/TRAINING PROGRAM

## 2023-08-14 PROCEDURE — 3288F PR FALLS RISK ASSESSMENT DOCUMENTED: ICD-10-PCS | Mod: CPTII,,, | Performed by: STUDENT IN AN ORGANIZED HEALTH CARE EDUCATION/TRAINING PROGRAM

## 2023-08-14 PROCEDURE — 1159F PR MEDICATION LIST DOCUMENTED IN MEDICAL RECORD: ICD-10-PCS | Mod: CPTII,,, | Performed by: STUDENT IN AN ORGANIZED HEALTH CARE EDUCATION/TRAINING PROGRAM

## 2023-08-14 PROCEDURE — 1101F PT FALLS ASSESS-DOCD LE1/YR: CPT | Mod: CPTII,,, | Performed by: STUDENT IN AN ORGANIZED HEALTH CARE EDUCATION/TRAINING PROGRAM

## 2023-08-14 PROCEDURE — 3074F SYST BP LT 130 MM HG: CPT | Mod: CPTII,,, | Performed by: STUDENT IN AN ORGANIZED HEALTH CARE EDUCATION/TRAINING PROGRAM

## 2023-08-14 PROCEDURE — 1160F RVW MEDS BY RX/DR IN RCRD: CPT | Mod: CPTII,,, | Performed by: STUDENT IN AN ORGANIZED HEALTH CARE EDUCATION/TRAINING PROGRAM

## 2023-08-14 PROCEDURE — 99213 OFFICE O/P EST LOW 20 MIN: CPT | Mod: ,,, | Performed by: STUDENT IN AN ORGANIZED HEALTH CARE EDUCATION/TRAINING PROGRAM

## 2023-08-14 PROCEDURE — 3078F DIAST BP <80 MM HG: CPT | Mod: CPTII,,, | Performed by: STUDENT IN AN ORGANIZED HEALTH CARE EDUCATION/TRAINING PROGRAM

## 2023-08-14 PROCEDURE — 1160F PR REVIEW ALL MEDS BY PRESCRIBER/CLIN PHARMACIST DOCUMENTED: ICD-10-PCS | Mod: CPTII,,, | Performed by: STUDENT IN AN ORGANIZED HEALTH CARE EDUCATION/TRAINING PROGRAM

## 2023-08-14 PROCEDURE — 99213 PR OFFICE/OUTPT VISIT, EST, LEVL III, 20-29 MIN: ICD-10-PCS | Mod: ,,, | Performed by: STUDENT IN AN ORGANIZED HEALTH CARE EDUCATION/TRAINING PROGRAM

## 2023-08-14 PROCEDURE — 3074F PR MOST RECENT SYSTOLIC BLOOD PRESSURE < 130 MM HG: ICD-10-PCS | Mod: CPTII,,, | Performed by: STUDENT IN AN ORGANIZED HEALTH CARE EDUCATION/TRAINING PROGRAM

## 2023-08-14 PROCEDURE — 3044F HG A1C LEVEL LT 7.0%: CPT | Mod: CPTII,,, | Performed by: STUDENT IN AN ORGANIZED HEALTH CARE EDUCATION/TRAINING PROGRAM

## 2023-08-14 PROCEDURE — 3288F FALL RISK ASSESSMENT DOCD: CPT | Mod: CPTII,,, | Performed by: STUDENT IN AN ORGANIZED HEALTH CARE EDUCATION/TRAINING PROGRAM

## 2023-08-14 RX ORDER — BISACODYL 5 MG
10 TABLET, DELAYED RELEASE (ENTERIC COATED) ORAL DAILY PRN
Qty: 30 TABLET | Refills: 0 | Status: SHIPPED | OUTPATIENT
Start: 2023-08-14 | End: 2023-09-13

## 2023-08-14 NOTE — PROGRESS NOTES
Patient ID: [id number]     Chief Complaint: Constipation    HPI:      Disclaimer:  This note is prepared using voice recognition software and as such is likely to have errors despite attempts at proofreading. Please contact me for questions.     Gustavo Lanza is a 77 y.o. male here today for the following      Acute Issues-  Dizziness- resolved  CONSTIPATION   Patient reports that he has been having intermittent constipation.  He can not do MiraLax as he has loose stool with MiraLax and records requesting for a stool softener.  Denies of any blood from the stool.  Was supposed to get Cologuard/colonoscopy done but never got a chance to get it done.  Denies of any night sweats, fatigue, appetite loss.  Reports having fair appetite.  His workout session is pretty intense.  His weight has been constant throughout a year.  On August 3, 2022 he was 123 lb, today he weighs 121 lb.  He would like to gain some weight.      Chronic Issues-    ----------------------------  Atherosclerosis of arteries of extremities  BPH (benign prostatic hyperplasia)  Carotid artery stenosis  Chronic idiopathic constipation  COPD (chronic obstructive pulmonary disease)  Coronary artery disease involving native coronary artery of native   heart without angina pectoris  Impaired fasting glucose  Mixed hyperlipidemia  Raynaud disease  S/P AAA repair     Past Surgical History:   Procedure Laterality Date    ABDOMINAL AORTIC ANEURYSM REPAIR  03/13/2020    Surgeon: Dr. Ovalles    CARDIAC SURGERY  01/30/2014    CABG    COLONOSCOPY  11/12/2021    CARLOS MOHAMUD MD    HERNIA REPAIR Left 03/23/2016    PERIPHERAL ANGIOGRAPHY  12/18/2015    Femoral Popliteal revas w/stent    REPAIR OF HEART VALVE         Review of patient's allergies indicates:   Allergen Reactions    Nitrofurantoin monohyd/m-cryst        Current Outpatient Medications   Medication Instructions    albuterol (PROVENTIL/VENTOLIN HFA) 90 mcg/actuation inhaler 1-2 puffs, Inhalation,  Every 6 hours PRN, Rescue    albuterol-ipratropium (DUO-NEB) 2.5 mg-0.5 mg/3 mL nebulizer solution 3 mLs, Nebulization, Every 6 hours PRN, Rescue    aspirin (ECOTRIN) 81 mg, Oral, Daily    carvediloL (COREG) 6.25 mg, Oral, 2 times daily    rosuvastatin (CRESTOR) 5 mg, Oral, Nightly    tamsulosin (FLOMAX) 0.4 mg Cap 1 capsule, Oral, Daily    traZODone (DESYREL) 150 mg, Oral, Nightly, trazodone 150 mg tablet       Social History     Socioeconomic History    Marital status:    Tobacco Use    Smoking status: Every Day     Current packs/day: 0.50     Types: Cigarettes     Passive exposure: Current    Smokeless tobacco: Never   Substance and Sexual Activity    Alcohol use: Never    Drug use: Never    Sexual activity: Yes        Family History   Problem Relation Age of Onset    No Known Problems Mother     No Known Problems Father     No Known Problems Sister     No Known Problems Brother         Patient Care Team:  Kayli Dorantes MD as PCP - General (Family Medicine)     Subjective:     ROS    See HPI for details    Constitutional: Denies Change in appetite. Denies Chills. Denies Fever. Denies Night sweats.  Respiratory: Denies Cough. Denies Shortness of breath. Denies Shortness of breath with exertion. Denies Wheezing.  Cardiovascular: DeniesChest pain at rest. Denies Chest pain with exertion. Denies Irregular heartbeat. Denies Palpitations. Denies Edema.  Gastrointestinal: Denies Abdominal pain. DeniesDiarrhea. Denies Nausea. Denies Vomiting. Denies Hematemesis or Hematochezia.  Genitourinary: Denies Dysuria. Denies Urinary frequency. Denies Urinary urgency. Denies Blood in urine.  Endocrine: Denies Cold intolerance. Denies Excessive thirst. Denies Heat intolerance. Denies Weight loss. Denies Weight gain.  Musculoskeletal: Denies Painful joints. Denies Weakness.  Integumentary: Denies Rash. Denies Itching. Denies Dry skin.  Neurologic: Denies Dizziness. Denies Fainting. Denies Headache.  Psychiatric: Denies  Depression. Denies Anxiety. Denies Suicidal/Homicidal ideations.    All Other ROS: Negative except as stated in HPI.  Objective:     Visit Vitals  /62 (BP Location: Right arm, Patient Position: Sitting, BP Method: Large (Manual))   Pulse 83   Temp 98 °F (36.7 °C) (Oral)       Physical Exam    General: Alert and oriented, No acute distress.  Respiratory: Clear to auscultation bilaterally; No wheezes, rales or rhonchi,Non-labored respirations, Symmetrical chest wall expansion.  Cardiovascular: Regular rate and rhythm, S1/S2 normal, No murmurs, rubs or gallops.  Gastrointestinal: Soft, Non-tender, Non-distended, Normal bowel sounds, No palpable organomegaly.  Musculoskeletal: Normal range of motion.  Extremities: No clubbing, cyanosis or edema  Neurologic: No focal deficits  Psychiatric: Normal interaction, Coherent speech, Euthymic mood, Appropriate affect   Assessment:       ICD-10-CM ICD-9-CM   1. Constipation, unspecified constipation type  K59.00 564.00   2. Encounter for screening for malignant neoplasm of colon  Z12.11 V76.51        Plan:     1. Constipation, unspecified constipation type  -     bisacodyL (DULCOLAX) 5 mg EC tablet; Take 2 tablets (10 mg total) by mouth daily as needed for Constipation.  Dispense: 30 tablet; Refill: 0  Recommend to increase green vegetables and fluids  Start bisacodyl as prescribed     2. Encounter for screening for malignant neoplasm of colon  -     Cologuard Screening (Multitarget Stool DNA); Future; Expected date: 08/14/2023           Medication List with Changes/Refills   Current Medications    ALBUTEROL (PROVENTIL/VENTOLIN HFA) 90 MCG/ACTUATION INHALER    Inhale 1-2 puffs into the lungs every 6 (six) hours as needed for Wheezing. Rescue       Start Date: 6/6/2023  End Date: --    ALBUTEROL-IPRATROPIUM (DUO-NEB) 2.5 MG-0.5 MG/3 ML NEBULIZER SOLUTION    Take 3 mLs by nebulization every 6 (six) hours as needed for Wheezing. Rescue       Start Date: 6/6/2023  End Date:  6/5/2024    ASPIRIN (ECOTRIN) 81 MG EC TABLET    Take 81 mg by mouth once daily.       Start Date: --        End Date: --    CARVEDILOL (COREG) 6.25 MG TABLET    Take 6.25 mg by mouth 2 (two) times daily.       Start Date: 11/29/2021End Date: --    ROSUVASTATIN (CRESTOR) 5 MG TABLET    Take 1 tablet (5 mg total) by mouth nightly.       Start Date: 4/6/2023  End Date: --    TAMSULOSIN (FLOMAX) 0.4 MG CAP    Take 1 capsule by mouth once daily.       Start Date: 5/24/2022 End Date: --    TRAZODONE (DESYREL) 150 MG TABLET    Take 1 tablet (150 mg total) by mouth nightly. trazodone 150 mg tablet       Start Date: 6/6/2023  End Date: --          Follow up in about 3 months (around 11/14/2023) for Follow Up.   In addition to their scheduled follow up, the patient has also been instructed to follow up on as needed basis.

## 2023-08-24 DIAGNOSIS — G47.9 SLEEPING DIFFICULTIES: ICD-10-CM

## 2023-08-24 RX ORDER — TRAZODONE HYDROCHLORIDE 150 MG/1
150 TABLET ORAL NIGHTLY
Qty: 90 TABLET | Refills: 0 | Status: SHIPPED | OUTPATIENT
Start: 2023-08-24 | End: 2023-09-06 | Stop reason: SDUPTHER

## 2023-08-24 RX ORDER — TRAZODONE HYDROCHLORIDE 150 MG/1
150 TABLET ORAL NIGHTLY
Qty: 90 TABLET | Refills: 0 | Status: SHIPPED | OUTPATIENT
Start: 2023-08-24 | End: 2023-08-24 | Stop reason: SDUPTHER

## 2023-08-24 NOTE — TELEPHONE ENCOUNTER
Patient is requesting a refill on Trazodone be sent to the [facility] pharmacy on [address].

## 2023-08-29 LAB — NONINV COLON CA DNA+OCC BLD SCRN STL QL: NEGATIVE

## 2023-08-30 ENCOUNTER — TELEPHONE (OUTPATIENT)
Dept: FAMILY MEDICINE | Facility: CLINIC | Age: 78
End: 2023-08-30
Payer: MEDICARE

## 2023-08-30 NOTE — TELEPHONE ENCOUNTER
----- Message from Kayli Dorantes MD sent at 8/29/2023  4:21 PM CDT -----  Please inform patient of normal colon cancer screening. Repeat in 3 years.

## 2023-09-05 ENCOUNTER — TELEPHONE (OUTPATIENT)
Dept: FAMILY MEDICINE | Facility: CLINIC | Age: 78
End: 2023-09-05
Payer: MEDICARE

## 2023-09-05 DIAGNOSIS — R91.8 MULTIPLE LUNG NODULES: Primary | ICD-10-CM

## 2023-09-05 DIAGNOSIS — J44.9 CHRONIC OBSTRUCTIVE PULMONARY DISEASE, UNSPECIFIED COPD TYPE: ICD-10-CM

## 2023-09-05 DIAGNOSIS — R91.1 LUNG NODULE: Primary | ICD-10-CM

## 2023-09-05 DIAGNOSIS — R91.8 OTHER NONSPECIFIC ABNORMAL FINDING OF LUNG FIELD: ICD-10-CM

## 2023-09-05 DIAGNOSIS — R91.1 SOLITARY PULMONARY NODULE: ICD-10-CM

## 2023-09-05 DIAGNOSIS — R59.0 LOCALIZED ENLARGED LYMPH NODES: ICD-10-CM

## 2023-09-06 ENCOUNTER — OFFICE VISIT (OUTPATIENT)
Dept: FAMILY MEDICINE | Facility: CLINIC | Age: 78
End: 2023-09-06
Payer: MEDICARE

## 2023-09-06 VITALS
BODY MASS INDEX: 19.24 KG/M2 | RESPIRATION RATE: 17 BRPM | TEMPERATURE: 98 F | DIASTOLIC BLOOD PRESSURE: 62 MMHG | SYSTOLIC BLOOD PRESSURE: 110 MMHG | OXYGEN SATURATION: 98 % | WEIGHT: 119.69 LBS | HEIGHT: 66 IN | HEART RATE: 84 BPM

## 2023-09-06 DIAGNOSIS — F17.210 CIGARETTE NICOTINE DEPENDENCE WITHOUT COMPLICATION: ICD-10-CM

## 2023-09-06 DIAGNOSIS — E78.2 MIXED HYPERLIPIDEMIA: ICD-10-CM

## 2023-09-06 DIAGNOSIS — Z23 NEED FOR TETANUS, DIPHTHERIA, AND ACELLULAR PERTUSSIS (TDAP) VACCINE: ICD-10-CM

## 2023-09-06 DIAGNOSIS — G47.00 INSOMNIA, UNSPECIFIED TYPE: ICD-10-CM

## 2023-09-06 DIAGNOSIS — J44.9 CHRONIC OBSTRUCTIVE PULMONARY DISEASE, UNSPECIFIED COPD TYPE: ICD-10-CM

## 2023-09-06 PROCEDURE — 3044F HG A1C LEVEL LT 7.0%: CPT | Mod: CPTII,,, | Performed by: STUDENT IN AN ORGANIZED HEALTH CARE EDUCATION/TRAINING PROGRAM

## 2023-09-06 PROCEDURE — 3288F PR FALLS RISK ASSESSMENT DOCUMENTED: ICD-10-PCS | Mod: CPTII,,, | Performed by: STUDENT IN AN ORGANIZED HEALTH CARE EDUCATION/TRAINING PROGRAM

## 2023-09-06 PROCEDURE — 1126F AMNT PAIN NOTED NONE PRSNT: CPT | Mod: CPTII,,, | Performed by: STUDENT IN AN ORGANIZED HEALTH CARE EDUCATION/TRAINING PROGRAM

## 2023-09-06 PROCEDURE — 1160F RVW MEDS BY RX/DR IN RCRD: CPT | Mod: CPTII,,, | Performed by: STUDENT IN AN ORGANIZED HEALTH CARE EDUCATION/TRAINING PROGRAM

## 2023-09-06 PROCEDURE — 99214 OFFICE O/P EST MOD 30 MIN: CPT | Mod: 25,,, | Performed by: STUDENT IN AN ORGANIZED HEALTH CARE EDUCATION/TRAINING PROGRAM

## 2023-09-06 PROCEDURE — 3044F PR MOST RECENT HEMOGLOBIN A1C LEVEL <7.0%: ICD-10-PCS | Mod: CPTII,,, | Performed by: STUDENT IN AN ORGANIZED HEALTH CARE EDUCATION/TRAINING PROGRAM

## 2023-09-06 PROCEDURE — 1101F PR PT FALLS ASSESS DOC 0-1 FALLS W/OUT INJ PAST YR: ICD-10-PCS | Mod: CPTII,,, | Performed by: STUDENT IN AN ORGANIZED HEALTH CARE EDUCATION/TRAINING PROGRAM

## 2023-09-06 PROCEDURE — 3288F FALL RISK ASSESSMENT DOCD: CPT | Mod: CPTII,,, | Performed by: STUDENT IN AN ORGANIZED HEALTH CARE EDUCATION/TRAINING PROGRAM

## 2023-09-06 PROCEDURE — 3074F PR MOST RECENT SYSTOLIC BLOOD PRESSURE < 130 MM HG: ICD-10-PCS | Mod: CPTII,,, | Performed by: STUDENT IN AN ORGANIZED HEALTH CARE EDUCATION/TRAINING PROGRAM

## 2023-09-06 PROCEDURE — 1159F PR MEDICATION LIST DOCUMENTED IN MEDICAL RECORD: ICD-10-PCS | Mod: CPTII,,, | Performed by: STUDENT IN AN ORGANIZED HEALTH CARE EDUCATION/TRAINING PROGRAM

## 2023-09-06 PROCEDURE — 1126F PR PAIN SEVERITY QUANTIFIED, NO PAIN PRESENT: ICD-10-PCS | Mod: CPTII,,, | Performed by: STUDENT IN AN ORGANIZED HEALTH CARE EDUCATION/TRAINING PROGRAM

## 2023-09-06 PROCEDURE — 3074F SYST BP LT 130 MM HG: CPT | Mod: CPTII,,, | Performed by: STUDENT IN AN ORGANIZED HEALTH CARE EDUCATION/TRAINING PROGRAM

## 2023-09-06 PROCEDURE — 1159F MED LIST DOCD IN RCRD: CPT | Mod: CPTII,,, | Performed by: STUDENT IN AN ORGANIZED HEALTH CARE EDUCATION/TRAINING PROGRAM

## 2023-09-06 PROCEDURE — 99406 PR TOBACCO USE CESSATION INTERMEDIATE 3-10 MINUTES: ICD-10-PCS | Mod: ,,, | Performed by: STUDENT IN AN ORGANIZED HEALTH CARE EDUCATION/TRAINING PROGRAM

## 2023-09-06 PROCEDURE — 99406 BEHAV CHNG SMOKING 3-10 MIN: CPT | Mod: ,,, | Performed by: STUDENT IN AN ORGANIZED HEALTH CARE EDUCATION/TRAINING PROGRAM

## 2023-09-06 PROCEDURE — 1160F PR REVIEW ALL MEDS BY PRESCRIBER/CLIN PHARMACIST DOCUMENTED: ICD-10-PCS | Mod: CPTII,,, | Performed by: STUDENT IN AN ORGANIZED HEALTH CARE EDUCATION/TRAINING PROGRAM

## 2023-09-06 PROCEDURE — 3078F DIAST BP <80 MM HG: CPT | Mod: CPTII,,, | Performed by: STUDENT IN AN ORGANIZED HEALTH CARE EDUCATION/TRAINING PROGRAM

## 2023-09-06 PROCEDURE — 1101F PT FALLS ASSESS-DOCD LE1/YR: CPT | Mod: CPTII,,, | Performed by: STUDENT IN AN ORGANIZED HEALTH CARE EDUCATION/TRAINING PROGRAM

## 2023-09-06 PROCEDURE — 3078F PR MOST RECENT DIASTOLIC BLOOD PRESSURE < 80 MM HG: ICD-10-PCS | Mod: CPTII,,, | Performed by: STUDENT IN AN ORGANIZED HEALTH CARE EDUCATION/TRAINING PROGRAM

## 2023-09-06 PROCEDURE — 99214 PR OFFICE/OUTPT VISIT, EST, LEVL IV, 30-39 MIN: ICD-10-PCS | Mod: 25,,, | Performed by: STUDENT IN AN ORGANIZED HEALTH CARE EDUCATION/TRAINING PROGRAM

## 2023-09-06 RX ORDER — BUDESONIDE, GLYCOPYRROLATE, AND FORMOTEROL FUMARATE 160; 9; 4.8 UG/1; UG/1; UG/1
2 AEROSOL, METERED RESPIRATORY (INHALATION) 2 TIMES DAILY
Qty: 5.9 G | Refills: 5 | Status: SHIPPED | OUTPATIENT
Start: 2023-09-06 | End: 2023-09-21

## 2023-09-06 RX ORDER — ROSUVASTATIN CALCIUM 5 MG/1
5 TABLET, COATED ORAL NIGHTLY
Qty: 90 TABLET | Refills: 3 | Status: SHIPPED | OUTPATIENT
Start: 2023-09-06

## 2023-09-06 RX ORDER — IPRATROPIUM BROMIDE AND ALBUTEROL SULFATE 2.5; .5 MG/3ML; MG/3ML
3 SOLUTION RESPIRATORY (INHALATION) EVERY 6 HOURS PRN
Qty: 75 ML | Refills: 2 | Status: SHIPPED | OUTPATIENT
Start: 2023-09-06 | End: 2023-09-25 | Stop reason: SDUPTHER

## 2023-09-06 RX ORDER — TRAZODONE HYDROCHLORIDE 150 MG/1
150 TABLET ORAL NIGHTLY
Qty: 90 TABLET | Refills: 0 | Status: SHIPPED | OUTPATIENT
Start: 2023-09-06 | End: 2023-11-27 | Stop reason: SDUPTHER

## 2023-09-06 NOTE — PROGRESS NOTES
Patient ID: [id number]     Chief Complaint: Follow-up (Change of inhaler)        HPI:      Disclaimer:  This note is prepared using voice recognition software and as such is likely to have errors despite attempts at proofreading. Please contact me for questions.     Gustavo Lanza is a 77 y.o. male here today for the following      Acute Issues-  COPD  Patient reports that he has been benefited with albuterol/ ipratropium inhaler and would like to get a refill for the same.  Reports to continue to smoke every now and then  As a CT chest ordered for surveillance of lung nodule    Hyperlipidemia  Would like to get the refill for Crestor   Currently asymptomatic     Insomnia  Would like to get a refill for trazodone  Chronic Issues-    ----------------------------  Atherosclerosis of arteries of extremities  BPH (benign prostatic hyperplasia)  Carotid artery stenosis  Chronic idiopathic constipation  COPD (chronic obstructive pulmonary disease)  Coronary artery disease involving native coronary artery of native   heart without angina pectoris  Impaired fasting glucose  Mixed hyperlipidemia  Raynaud disease  S/P AAA repair     Past Surgical History:   Procedure Laterality Date    ABDOMINAL AORTIC ANEURYSM REPAIR  03/13/2020    Surgeon: Dr. Ovalles    CARDIAC SURGERY  01/30/2014    [facility]    COLONOSCOPY  11/12/2021    CARLOS MOHAMUD MD    HERNIA REPAIR Left 03/23/2016    PERIPHERAL ANGIOGRAPHY  12/18/2015    Femoral Popliteal revas w/stent    REPAIR OF HEART VALVE         Review of patient's allergies indicates:   Allergen Reactions    Nitrofurantoin monohyd/m-cryst        Current Outpatient Medications   Medication Instructions    albuterol (PROVENTIL/VENTOLIN HFA) 90 mcg/actuation inhaler 1-2 puffs, Inhalation, Every 6 hours PRN, Rescue    albuterol-ipratropium (DUO-NEB) 2.5 mg-0.5 mg/3 mL nebulizer solution 3 mLs, Nebulization, Every 6 hours PRN, Rescue    aspirin (ECOTRIN) 81 mg, Oral, Daily    bisacodyL (DULCOLAX)  10 mg, Oral, Daily PRN    carvediloL (COREG) 6.25 mg, Oral, 2 times daily    rosuvastatin (CRESTOR) 5 mg, Oral, Nightly    tamsulosin (FLOMAX) 0.4 mg Cap 1 capsule, Oral, Daily    traZODone (DESYREL) 150 mg, Oral, Nightly, trazodone 150 mg tablet       Social History     Socioeconomic History    Marital status:    Tobacco Use    Smoking status: Every Day     Current packs/day: 0.50     Types: Cigarettes     Passive exposure: Current    Smokeless tobacco: Never    Tobacco comments:     2-3 cigarettes a day.   Substance and Sexual Activity    Alcohol use: Never    Drug use: Never    Sexual activity: Yes        Family History   Problem Relation Age of Onset    No Known Problems Mother     No Known Problems Father     No Known Problems Sister     No Known Problems Brother         Patient Care Team:  Kayli Dorantes MD as PCP - General (Family Medicine)     Subjective:     ROS    See HPI for details    Constitutional: Denies Change in appetite. Denies Chills. Denies Fever. Denies Night sweats.  Respiratory: Denies Cough. Denies Shortness of breath. Denies Shortness of breath with exertion. Denies Wheezing.  Cardiovascular: DeniesChest pain at rest. Denies Chest pain with exertion. Denies Irregular heartbeat. Denies Palpitations. Denies Edema.  Gastrointestinal: Denies Abdominal pain. DeniesDiarrhea. Denies Nausea. Denies Vomiting. Denies Hematemesis or Hematochezia.  Genitourinary: Denies Dysuria. Denies Urinary frequency. Denies Urinary urgency. Denies Blood in urine.  Endocrine: Denies Cold intolerance. Denies Excessive thirst. Denies Heat intolerance. Denies Weight loss. Denies Weight gain.  Musculoskeletal: Denies Painful joints. Denies Weakness.  Integumentary: Denies Rash. Denies Itching. Denies Dry skin.  Neurologic: Denies Dizziness. Denies Fainting. Denies Headache.  Psychiatric: Denies Depression. Denies Anxiety. Denies Suicidal/Homicidal ideations.    All Other ROS: Negative except as stated in  "HPI.  Objective:     Visit Vitals  /62 (BP Location: Right arm, Patient Position: Sitting, BP Method: Medium (Manual))   Pulse 84   Temp 98.2 °F (36.8 °C) (Oral)   Resp 17   Ht 5' 6" (1.676 m)   Wt 54.3 kg (119 lb 11.2 oz)   SpO2 98%   BMI 19.32 kg/m²       Physical Exam    General: Alert and oriented, No acute distress.  Respiratory: Clear to auscultation bilaterally; No wheezes, rales or rhonchi,Non-labored respirations, Symmetrical chest wall expansion.  Cardiovascular: Regular rate and rhythm, S1/S2 normal, No murmurs, rubs or gallops.  Gastrointestinal: Soft, Non-tender, Non-distended, Normal bowel sounds, No palpable organomegaly.  Musculoskeletal: Normal range of motion.  Extremities: No clubbing, cyanosis or edema  Neurologic: No focal deficits  Psychiatric: Normal interaction, Coherent speech, Euthymic mood, Appropriate affect   Assessment:       ICD-10-CM ICD-9-CM   1. Chronic obstructive pulmonary disease, unspecified COPD type  J44.9 496   2. Cigarette nicotine dependence without complication  F17.210 305.1   3. Need for tetanus, diphtheria, and acellular pertussis (Tdap) vaccine  Z23 V06.1   4. Sleeping difficulties  G47.9 780.50   5. Insomnia, unspecified type  G47.00 780.52   6. Mixed hyperlipidemia  E78.2 272.2        Plan:     1. Chronic obstructive pulmonary disease, unspecified COPD type  -     budesonide-glycopyr-formoterol (BREZTRI AEROSPHERE) 160-9-4.8 mcg/actuation HFAA; Inhale 2 Inhalers into the lungs 2 (two) times a day.  Dispense: 5.9 g; Refill: 5  -     albuterol-ipratropium (DUO-NEB) 2.5 mg-0.5 mg/3 mL nebulizer solution; Take 3 mLs by nebulization every 6 (six) hours as needed for Wheezing. Rescue  Dispense: 75 mL; Refill: 2  Recommend patient to quit smoking    2. Cigarette nicotine dependence without complication  Discussed the risk of Smoking causing cancer for > 3 min  Offered Smoking cessation program. Patient may consider smoking cessation but declined as of now.   Patient " voiced understanding the risks of smoking program in future.    3. Need for tetanus, diphtheria, and acellular pertussis (Tdap) vaccine  -     DIPH,PERTUSS,ACEL,,TET VAC,PF, ADULT (ADACEL) 2 Lf-(2.5-5-3-5 mcg)-5Lf/0.5 mL Syrg; Inject 0.5 mLs into the muscle once. Please administer vaccine. for 1 dose  Dispense: 1 each; Refill: 0    4. Insomnia, unspecified type  -     traZODone (DESYREL) 150 MG tablet; Take 1 tablet (150 mg total) by mouth nightly. trazodone 150 mg tablet  Dispense: 90 tablet; Refill: 0    6. Mixed hyperlipidemia  -     rosuvastatin (CRESTOR) 5 MG tablet; Take 1 tablet (5 mg total) by mouth nightly.  Dispense: 90 tablet; Refill: 3  Continue 35 minutes of brisk walking and Mediterranean         Medication List with Changes/Refills   Current Medications    ALBUTEROL (PROVENTIL/VENTOLIN HFA) 90 MCG/ACTUATION INHALER    Inhale 1-2 puffs into the lungs every 6 (six) hours as needed for Wheezing. Rescue       Start Date: 6/6/2023  End Date: --    ALBUTEROL-IPRATROPIUM (DUO-NEB) 2.5 MG-0.5 MG/3 ML NEBULIZER SOLUTION    Take 3 mLs by nebulization every 6 (six) hours as needed for Wheezing. Rescue       Start Date: 6/6/2023  End Date: 6/5/2024    ASPIRIN (ECOTRIN) 81 MG EC TABLET    Take 81 mg by mouth once daily.       Start Date: --        End Date: --    BISACODYL (DULCOLAX) 5 MG EC TABLET    Take 2 tablets (10 mg total) by mouth daily as needed for Constipation.       Start Date: 8/14/2023 End Date: 9/13/2023    CARVEDILOL (COREG) 6.25 MG TABLET    Take 6.25 mg by mouth 2 (two) times daily.       Start Date: 11/29/2021End Date: --    ROSUVASTATIN (CRESTOR) 5 MG TABLET    Take 1 tablet (5 mg total) by mouth nightly.       Start Date: 4/6/2023  End Date: --    TAMSULOSIN (FLOMAX) 0.4 MG CAP    Take 1 capsule by mouth once daily.       Start Date: 5/24/2022 End Date: --    TRAZODONE (DESYREL) 150 MG TABLET    Take 1 tablet (150 mg total) by mouth nightly. trazodone 150 mg tablet       Start Date: 8/24/2023  End Date: --          Follow up in about 3 months (around 12/6/2023) for Follow Up COpd .   In addition to their scheduled follow up, the patient has also been instructed to follow up on as needed basis.

## 2023-09-07 ENCOUNTER — TELEPHONE (OUTPATIENT)
Dept: FAMILY MEDICINE | Facility: CLINIC | Age: 78
End: 2023-09-07
Payer: MEDICARE

## 2023-09-07 NOTE — TELEPHONE ENCOUNTER
----- Message from Lisandrashun Ronnell sent at 9/6/2023  3:38 PM CDT -----  .Type:  Pharmacy Calling to Clarify an RX    Name of Caller:  Pharmacy Name:[facility]   Prescription Name:DIPH,PERTUSS,ACEL,,TET VAC,PF, ADULT (ADACEL) 2 Lf-(2.5-5-3-5 mcg)-5Lf/0.5 mL Syrg  What do they need to clarify?:does the pt need this?   Best Call Back Number:  Additional Information: Staff wants to know why the pt needs this injection because is out of the pt age range. Staff states they can offer Bostrix or give the injection

## 2023-09-21 DIAGNOSIS — J44.9 CHRONIC OBSTRUCTIVE PULMONARY DISEASE, UNSPECIFIED COPD TYPE: ICD-10-CM

## 2023-09-21 RX ORDER — BUDESONIDE, GLYCOPYRROLATE, AND FORMOTEROL FUMARATE 160; 9; 4.8 UG/1; UG/1; UG/1
1 AEROSOL, METERED RESPIRATORY (INHALATION) 2 TIMES DAILY
Qty: 10.7 G | Refills: 11 | Status: SHIPPED | OUTPATIENT
Start: 2023-09-21 | End: 2024-03-26 | Stop reason: SDUPTHER

## 2023-09-25 DIAGNOSIS — J44.9 CHRONIC OBSTRUCTIVE PULMONARY DISEASE, UNSPECIFIED COPD TYPE: ICD-10-CM

## 2023-09-25 RX ORDER — IPRATROPIUM BROMIDE AND ALBUTEROL SULFATE 2.5; .5 MG/3ML; MG/3ML
3 SOLUTION RESPIRATORY (INHALATION) EVERY 6 HOURS PRN
Qty: 75 ML | Refills: 2 | Status: SHIPPED | OUTPATIENT
Start: 2023-09-25 | End: 2024-09-24

## 2023-09-25 RX ORDER — IPRATROPIUM BROMIDE AND ALBUTEROL SULFATE 2.5; .5 MG/3ML; MG/3ML
3 SOLUTION RESPIRATORY (INHALATION) EVERY 6 HOURS PRN
Qty: 75 ML | Refills: 2 | Status: SHIPPED | OUTPATIENT
Start: 2023-09-25 | End: 2023-09-25 | Stop reason: SDUPTHER

## 2023-10-19 ENCOUNTER — TELEPHONE (OUTPATIENT)
Dept: PRIMARY CARE CLINIC | Facility: CLINIC | Age: 78
End: 2023-10-19
Payer: MEDICARE

## 2023-10-30 ENCOUNTER — OFFICE VISIT (OUTPATIENT)
Dept: PRIMARY CARE CLINIC | Facility: CLINIC | Age: 78
End: 2023-10-30
Payer: MEDICARE

## 2023-10-30 VITALS
DIASTOLIC BLOOD PRESSURE: 75 MMHG | TEMPERATURE: 98 F | HEIGHT: 66 IN | BODY MASS INDEX: 19.96 KG/M2 | WEIGHT: 124.19 LBS | OXYGEN SATURATION: 94 % | SYSTOLIC BLOOD PRESSURE: 167 MMHG | RESPIRATION RATE: 16 BRPM | HEART RATE: 82 BPM

## 2023-10-30 DIAGNOSIS — N40.1 BENIGN PROSTATIC HYPERPLASIA WITH URINARY FREQUENCY: Chronic | ICD-10-CM

## 2023-10-30 DIAGNOSIS — K59.04 CHRONIC IDIOPATHIC CONSTIPATION: Primary | Chronic | ICD-10-CM

## 2023-10-30 DIAGNOSIS — R59.0 LOCALIZED ENLARGED LYMPH NODES: ICD-10-CM

## 2023-10-30 DIAGNOSIS — Z23 NEED FOR INFLUENZA VACCINATION: ICD-10-CM

## 2023-10-30 DIAGNOSIS — N52.9 ERECTILE DYSFUNCTION, UNSPECIFIED ERECTILE DYSFUNCTION TYPE: ICD-10-CM

## 2023-10-30 DIAGNOSIS — R35.0 BENIGN PROSTATIC HYPERPLASIA WITH URINARY FREQUENCY: Chronic | ICD-10-CM

## 2023-10-30 DIAGNOSIS — R91.1 SOLITARY PULMONARY NODULE: ICD-10-CM

## 2023-10-30 LAB
BILIRUB SERPL-MCNC: NORMAL MG/DL
BLOOD URINE, POC: NORMAL
CLARITY, POC UA: CLEAR
COLOR, POC UA: YELLOW
GLUCOSE UR QL STRIP: NORMAL
KETONES UR QL STRIP: NORMAL
LEUKOCYTE ESTERASE URINE, POC: NORMAL
NITRITE, POC UA: NORMAL
PH, POC UA: 7.5
PROTEIN, POC: NORMAL
SPECIFIC GRAVITY, POC UA: 1.03
UROBILINOGEN, POC UA: 2

## 2023-10-30 PROCEDURE — 3078F DIAST BP <80 MM HG: CPT | Mod: CPTII,,, | Performed by: STUDENT IN AN ORGANIZED HEALTH CARE EDUCATION/TRAINING PROGRAM

## 2023-10-30 PROCEDURE — 3077F PR MOST RECENT SYSTOLIC BLOOD PRESSURE >= 140 MM HG: ICD-10-PCS | Mod: CPTII,,, | Performed by: STUDENT IN AN ORGANIZED HEALTH CARE EDUCATION/TRAINING PROGRAM

## 2023-10-30 PROCEDURE — 81002 URINALYSIS NONAUTO W/O SCOPE: CPT | Mod: ,,, | Performed by: STUDENT IN AN ORGANIZED HEALTH CARE EDUCATION/TRAINING PROGRAM

## 2023-10-30 PROCEDURE — 90694 FLU VACCINE - QUADRIVALENT - ADJUVANTED: ICD-10-PCS | Mod: ,,, | Performed by: STUDENT IN AN ORGANIZED HEALTH CARE EDUCATION/TRAINING PROGRAM

## 2023-10-30 PROCEDURE — 99214 PR OFFICE/OUTPT VISIT, EST, LEVL IV, 30-39 MIN: ICD-10-PCS | Mod: ,,, | Performed by: STUDENT IN AN ORGANIZED HEALTH CARE EDUCATION/TRAINING PROGRAM

## 2023-10-30 PROCEDURE — 3078F PR MOST RECENT DIASTOLIC BLOOD PRESSURE < 80 MM HG: ICD-10-PCS | Mod: CPTII,,, | Performed by: STUDENT IN AN ORGANIZED HEALTH CARE EDUCATION/TRAINING PROGRAM

## 2023-10-30 PROCEDURE — G0008 FLU VACCINE - QUADRIVALENT - ADJUVANTED: ICD-10-PCS | Mod: ,,, | Performed by: STUDENT IN AN ORGANIZED HEALTH CARE EDUCATION/TRAINING PROGRAM

## 2023-10-30 PROCEDURE — 1160F PR REVIEW ALL MEDS BY PRESCRIBER/CLIN PHARMACIST DOCUMENTED: ICD-10-PCS | Mod: CPTII,,, | Performed by: STUDENT IN AN ORGANIZED HEALTH CARE EDUCATION/TRAINING PROGRAM

## 2023-10-30 PROCEDURE — G0008 ADMIN INFLUENZA VIRUS VAC: HCPCS | Mod: ,,, | Performed by: STUDENT IN AN ORGANIZED HEALTH CARE EDUCATION/TRAINING PROGRAM

## 2023-10-30 PROCEDURE — 3077F SYST BP >= 140 MM HG: CPT | Mod: CPTII,,, | Performed by: STUDENT IN AN ORGANIZED HEALTH CARE EDUCATION/TRAINING PROGRAM

## 2023-10-30 PROCEDURE — 3288F PR FALLS RISK ASSESSMENT DOCUMENTED: ICD-10-PCS | Mod: CPTII,,, | Performed by: STUDENT IN AN ORGANIZED HEALTH CARE EDUCATION/TRAINING PROGRAM

## 2023-10-30 PROCEDURE — 3044F PR MOST RECENT HEMOGLOBIN A1C LEVEL <7.0%: ICD-10-PCS | Mod: CPTII,,, | Performed by: STUDENT IN AN ORGANIZED HEALTH CARE EDUCATION/TRAINING PROGRAM

## 2023-10-30 PROCEDURE — 1160F RVW MEDS BY RX/DR IN RCRD: CPT | Mod: CPTII,,, | Performed by: STUDENT IN AN ORGANIZED HEALTH CARE EDUCATION/TRAINING PROGRAM

## 2023-10-30 PROCEDURE — 90694 VACC AIIV4 NO PRSRV 0.5ML IM: CPT | Mod: ,,, | Performed by: STUDENT IN AN ORGANIZED HEALTH CARE EDUCATION/TRAINING PROGRAM

## 2023-10-30 PROCEDURE — 1126F PR PAIN SEVERITY QUANTIFIED, NO PAIN PRESENT: ICD-10-PCS | Mod: CPTII,,, | Performed by: STUDENT IN AN ORGANIZED HEALTH CARE EDUCATION/TRAINING PROGRAM

## 2023-10-30 PROCEDURE — 1126F AMNT PAIN NOTED NONE PRSNT: CPT | Mod: CPTII,,, | Performed by: STUDENT IN AN ORGANIZED HEALTH CARE EDUCATION/TRAINING PROGRAM

## 2023-10-30 PROCEDURE — 3288F FALL RISK ASSESSMENT DOCD: CPT | Mod: CPTII,,, | Performed by: STUDENT IN AN ORGANIZED HEALTH CARE EDUCATION/TRAINING PROGRAM

## 2023-10-30 PROCEDURE — 3044F HG A1C LEVEL LT 7.0%: CPT | Mod: CPTII,,, | Performed by: STUDENT IN AN ORGANIZED HEALTH CARE EDUCATION/TRAINING PROGRAM

## 2023-10-30 PROCEDURE — 1159F MED LIST DOCD IN RCRD: CPT | Mod: CPTII,,, | Performed by: STUDENT IN AN ORGANIZED HEALTH CARE EDUCATION/TRAINING PROGRAM

## 2023-10-30 PROCEDURE — 81002 POCT URINE DIPSTICK WITHOUT MICROSCOPE: ICD-10-PCS | Mod: ,,, | Performed by: STUDENT IN AN ORGANIZED HEALTH CARE EDUCATION/TRAINING PROGRAM

## 2023-10-30 PROCEDURE — 1101F PR PT FALLS ASSESS DOC 0-1 FALLS W/OUT INJ PAST YR: ICD-10-PCS | Mod: CPTII,,, | Performed by: STUDENT IN AN ORGANIZED HEALTH CARE EDUCATION/TRAINING PROGRAM

## 2023-10-30 PROCEDURE — 99214 OFFICE O/P EST MOD 30 MIN: CPT | Mod: ,,, | Performed by: STUDENT IN AN ORGANIZED HEALTH CARE EDUCATION/TRAINING PROGRAM

## 2023-10-30 PROCEDURE — 1159F PR MEDICATION LIST DOCUMENTED IN MEDICAL RECORD: ICD-10-PCS | Mod: CPTII,,, | Performed by: STUDENT IN AN ORGANIZED HEALTH CARE EDUCATION/TRAINING PROGRAM

## 2023-10-30 PROCEDURE — 1101F PT FALLS ASSESS-DOCD LE1/YR: CPT | Mod: CPTII,,, | Performed by: STUDENT IN AN ORGANIZED HEALTH CARE EDUCATION/TRAINING PROGRAM

## 2023-10-30 RX ORDER — TADALAFIL 10 MG/1
10 TABLET ORAL DAILY PRN
Qty: 10 TABLET | Refills: 2 | Status: SHIPPED | OUTPATIENT
Start: 2023-10-30 | End: 2024-02-26

## 2023-10-30 RX ORDER — FINASTERIDE 5 MG/1
5 TABLET, FILM COATED ORAL DAILY
Qty: 30 TABLET | Refills: 11 | Status: SHIPPED | OUTPATIENT
Start: 2023-10-30 | End: 2024-10-29

## 2023-10-30 RX ORDER — ALBUTEROL SULFATE 90 UG/1
AEROSOL, METERED RESPIRATORY (INHALATION)
COMMUNITY
Start: 2023-10-10

## 2023-10-30 NOTE — ASSESSMENT & PLAN NOTE
Counseled on hydration. Admits he does not like water.     Recommended bowel regimen:  100-200 mg docusate sodium daily  Dulcolax if no BM in 48 hrs  Miralax if no BM in 72 hours    If all of above not effective consider Linzess

## 2023-10-30 NOTE — PROGRESS NOTES
"Subjective:       Patient ID: Gustavo Lanza is a 77 y.o. male.    ----------------------------  Atherosclerosis of arteries of extremities  BPH (benign prostatic hyperplasia)  Carotid artery stenosis  Chronic idiopathic constipation  COPD (chronic obstructive pulmonary disease)  Coronary artery disease involving native coronary artery of native   heart without angina pectoris  Impaired fasting glucose  Mixed hyperlipidemia  Raynaud disease  S/P AAA repair     Chief Complaint: Establish Care, Constipation, and Urinary Frequency    Presents to establish care.    C/o constipation, present 1-2 years. Has tried "everything" over-the-counter but none consistently. Does not drink water - only drinks iced tea.     C/o urinary frequency, 12-15 times/day, follows Dr. Mao ([facility]) but hasn't seen in >1 year. Tried Flomax for 3 months w/o improvement. Drinks tea all day (does not drink water).     Record review reveals Lung RAD 3 nodules on CT 4 months ago, repeat scan ordered by Dr. Dorantes but pt never completed.       Review of Systems   Constitutional:  Negative for chills and fever.   HENT:  Negative for sinus pressure/congestion and sore throat.    Respiratory:  Negative for cough, shortness of breath and wheezing.    Cardiovascular:  Negative for chest pain and leg swelling.   Gastrointestinal:  Positive for constipation. Negative for abdominal pain, nausea and vomiting.   Genitourinary:  Positive for frequency. Negative for dysuria and hematuria.   Musculoskeletal:  Negative for arthralgias and myalgias.   Integumentary:  Negative for rash.   Neurological:  Negative for dizziness and syncope.   Hematological:  Negative for adenopathy.   Psychiatric/Behavioral:  Negative for confusion. The patient is not nervous/anxious.            Objective:      Physical Exam  Vitals and nursing note reviewed.   Constitutional:       General: He is not in acute distress.  HENT:      Head: Normocephalic.      Nose: No " rhinorrhea.   Eyes:      Conjunctiva/sclera: Conjunctivae normal.      Pupils: Pupils are equal, round, and reactive to light.   Cardiovascular:      Rate and Rhythm: Normal rate and regular rhythm.      Heart sounds: Murmur heard.   Pulmonary:      Effort: Pulmonary effort is normal.      Comments: Decreased BS throughout  Abdominal:      Palpations: Abdomen is soft.      Tenderness: There is no abdominal tenderness.   Musculoskeletal:         General: No deformity or signs of injury.   Skin:     General: Skin is warm and dry.   Neurological:      General: No focal deficit present.      Mental Status: He is alert. Mental status is at baseline.   Psychiatric:         Mood and Affect: Mood normal.         Behavior: Behavior normal.           Assessment & Plan:   1. Chronic idiopathic constipation  Assessment & Plan:  Counseled on hydration. Admits he does not like water.     Recommended bowel regimen:  100-200 mg docusate sodium daily  Dulcolax if no BM in 48 hrs  Miralax if no BM in 72 hours    If all of above not effective consider Linzess      2. Solitary pulmonary nodule  Comments:  per CT 06/29/23, repeat LDCT was recommended at 3 months, overdue (pt never completed scan from previous provider - order was in system)  Orders:  -     CT Chest Low Dose Diagnostic; Future; Expected date: 11/06/2023    3. Benign prostatic hyperplasia with urinary frequency  Assessment & Plan:  Tried Flomax 3 months w/o improvement so pt has discontinued it.  Start trial of finasteride. Counseled that it may take months to be effective. counseled on potential side effects including worsening of his ED    Orders:  -     finasteride (PROSCAR) 5 mg tablet; Take 1 tablet (5 mg total) by mouth once daily.  Dispense: 30 tablet; Refill: 11  -     POCT URINE DIPSTICK WITHOUT MICROSCOPE    4. Erectile dysfunction, unspecified erectile dysfunction type  Comments:  pt requesting refills on cialis  Orders:  -     tadalafiL (CIALIS) 10 MG tablet;  Take 1 tablet (10 mg total) by mouth daily as needed for Erectile Dysfunction.  Dispense: 10 tablet; Refill: 2    5. Need for influenza vaccination  -     Influenza (FLUAD) - Quadrivalent (Adjuvanted) *Preferred* (65+) (PF)        Follow up in about 6 weeks (around 12/11/2023) for HTN, BPH. In addition to their scheduled follow up, the patient has also been instructed to follow up on as needed basis.

## 2023-10-31 NOTE — ASSESSMENT & PLAN NOTE
Tried Flomax 3 months w/o improvement so pt has discontinued it.  Start trial of finasteride. Counseled that it may take months to be effective. counseled on potential side effects including worsening of his ED

## 2023-11-14 ENCOUNTER — TELEPHONE (OUTPATIENT)
Dept: PRIMARY CARE CLINIC | Facility: CLINIC | Age: 78
End: 2023-11-14
Payer: MEDICARE

## 2023-11-14 VITALS — SYSTOLIC BLOOD PRESSURE: 131 MMHG | DIASTOLIC BLOOD PRESSURE: 77 MMHG

## 2023-11-14 NOTE — TELEPHONE ENCOUNTER
Reviewed home BP logs. BP is overall well controlled at home. No med adjustments at this time.     Gabino Pimentel MD

## 2023-11-27 DIAGNOSIS — G47.00 INSOMNIA, UNSPECIFIED TYPE: ICD-10-CM

## 2023-11-27 RX ORDER — TRAZODONE HYDROCHLORIDE 150 MG/1
150 TABLET ORAL NIGHTLY
Qty: 90 TABLET | Refills: 0 | Status: SHIPPED | OUTPATIENT
Start: 2023-11-27 | End: 2024-02-23 | Stop reason: SDUPTHER

## 2023-12-11 ENCOUNTER — OFFICE VISIT (OUTPATIENT)
Dept: PRIMARY CARE CLINIC | Facility: CLINIC | Age: 78
End: 2023-12-11
Payer: MEDICARE

## 2023-12-11 VITALS
HEART RATE: 79 BPM | BODY MASS INDEX: 20.19 KG/M2 | OXYGEN SATURATION: 96 % | HEIGHT: 66 IN | TEMPERATURE: 98 F | RESPIRATION RATE: 20 BRPM | SYSTOLIC BLOOD PRESSURE: 135 MMHG | DIASTOLIC BLOOD PRESSURE: 81 MMHG | WEIGHT: 125.63 LBS

## 2023-12-11 DIAGNOSIS — J44.9 CHRONIC OBSTRUCTIVE PULMONARY DISEASE, UNSPECIFIED COPD TYPE: Chronic | ICD-10-CM

## 2023-12-11 DIAGNOSIS — R03.0 ELEVATED BLOOD PRESSURE READING IN OFFICE WITHOUT DIAGNOSIS OF HYPERTENSION: Primary | ICD-10-CM

## 2023-12-11 PROCEDURE — 1159F MED LIST DOCD IN RCRD: CPT | Mod: CPTII,,, | Performed by: STUDENT IN AN ORGANIZED HEALTH CARE EDUCATION/TRAINING PROGRAM

## 2023-12-11 PROCEDURE — 3075F PR MOST RECENT SYSTOLIC BLOOD PRESS GE 130-139MM HG: ICD-10-PCS | Mod: CPTII,,, | Performed by: STUDENT IN AN ORGANIZED HEALTH CARE EDUCATION/TRAINING PROGRAM

## 2023-12-11 PROCEDURE — 3288F FALL RISK ASSESSMENT DOCD: CPT | Mod: CPTII,,, | Performed by: STUDENT IN AN ORGANIZED HEALTH CARE EDUCATION/TRAINING PROGRAM

## 2023-12-11 PROCEDURE — 1159F PR MEDICATION LIST DOCUMENTED IN MEDICAL RECORD: ICD-10-PCS | Mod: CPTII,,, | Performed by: STUDENT IN AN ORGANIZED HEALTH CARE EDUCATION/TRAINING PROGRAM

## 2023-12-11 PROCEDURE — 3079F PR MOST RECENT DIASTOLIC BLOOD PRESSURE 80-89 MM HG: ICD-10-PCS | Mod: CPTII,,, | Performed by: STUDENT IN AN ORGANIZED HEALTH CARE EDUCATION/TRAINING PROGRAM

## 2023-12-11 PROCEDURE — 3075F SYST BP GE 130 - 139MM HG: CPT | Mod: CPTII,,, | Performed by: STUDENT IN AN ORGANIZED HEALTH CARE EDUCATION/TRAINING PROGRAM

## 2023-12-11 PROCEDURE — 1101F PT FALLS ASSESS-DOCD LE1/YR: CPT | Mod: CPTII,,, | Performed by: STUDENT IN AN ORGANIZED HEALTH CARE EDUCATION/TRAINING PROGRAM

## 2023-12-11 PROCEDURE — 1126F PR PAIN SEVERITY QUANTIFIED, NO PAIN PRESENT: ICD-10-PCS | Mod: CPTII,,, | Performed by: STUDENT IN AN ORGANIZED HEALTH CARE EDUCATION/TRAINING PROGRAM

## 2023-12-11 PROCEDURE — 3288F PR FALLS RISK ASSESSMENT DOCUMENTED: ICD-10-PCS | Mod: CPTII,,, | Performed by: STUDENT IN AN ORGANIZED HEALTH CARE EDUCATION/TRAINING PROGRAM

## 2023-12-11 PROCEDURE — 1160F RVW MEDS BY RX/DR IN RCRD: CPT | Mod: CPTII,,, | Performed by: STUDENT IN AN ORGANIZED HEALTH CARE EDUCATION/TRAINING PROGRAM

## 2023-12-11 PROCEDURE — 99213 OFFICE O/P EST LOW 20 MIN: CPT | Mod: ,,, | Performed by: STUDENT IN AN ORGANIZED HEALTH CARE EDUCATION/TRAINING PROGRAM

## 2023-12-11 PROCEDURE — 3079F DIAST BP 80-89 MM HG: CPT | Mod: CPTII,,, | Performed by: STUDENT IN AN ORGANIZED HEALTH CARE EDUCATION/TRAINING PROGRAM

## 2023-12-11 PROCEDURE — 1126F AMNT PAIN NOTED NONE PRSNT: CPT | Mod: CPTII,,, | Performed by: STUDENT IN AN ORGANIZED HEALTH CARE EDUCATION/TRAINING PROGRAM

## 2023-12-11 PROCEDURE — 1160F PR REVIEW ALL MEDS BY PRESCRIBER/CLIN PHARMACIST DOCUMENTED: ICD-10-PCS | Mod: CPTII,,, | Performed by: STUDENT IN AN ORGANIZED HEALTH CARE EDUCATION/TRAINING PROGRAM

## 2023-12-11 PROCEDURE — 1101F PR PT FALLS ASSESS DOC 0-1 FALLS W/OUT INJ PAST YR: ICD-10-PCS | Mod: CPTII,,, | Performed by: STUDENT IN AN ORGANIZED HEALTH CARE EDUCATION/TRAINING PROGRAM

## 2023-12-11 PROCEDURE — 99213 PR OFFICE/OUTPT VISIT, EST, LEVL III, 20-29 MIN: ICD-10-PCS | Mod: ,,, | Performed by: STUDENT IN AN ORGANIZED HEALTH CARE EDUCATION/TRAINING PROGRAM

## 2023-12-11 RX ORDER — LEVOFLOXACIN 750 MG/1
750 TABLET ORAL DAILY
COMMUNITY
End: 2024-02-26

## 2023-12-11 NOTE — PROGRESS NOTES
"Subjective:       Patient ID: Gustavo Lanza is a 78 y.o. male.    ----------------------------  Atherosclerosis of arteries of extremities  BPH (benign prostatic hyperplasia)  Carotid artery stenosis  Chronic idiopathic constipation  COPD (chronic obstructive pulmonary disease)  Coronary artery disease involving native coronary artery of native   heart without angina pectoris  Impaired fasting glucose  Mixed hyperlipidemia  Raynaud disease  S/P AAA repair     Chief Complaint: Follow-up (HTN, BPH)    BP f/u - BP elevated here today but consistently controlled at home per home BP log. Some "white coat HTN" suspected.       Review of Systems   Constitutional:  Negative for chills and fever.   HENT:  Negative for sinus pressure/congestion and sore throat.    Respiratory:  Negative for cough, shortness of breath and wheezing.    Cardiovascular:  Negative for chest pain and leg swelling.   Gastrointestinal:  Negative for abdominal pain, nausea and vomiting.   Genitourinary:  Negative for dysuria and hematuria.   Musculoskeletal:  Negative for arthralgias and myalgias.   Integumentary:  Negative for rash.   Neurological:  Negative for dizziness and syncope.   Hematological:  Negative for adenopathy.   Psychiatric/Behavioral:  Negative for confusion. The patient is not nervous/anxious.            Objective:      Physical Exam  Vitals and nursing note reviewed.   Constitutional:       General: He is not in acute distress.  HENT:      Head: Normocephalic.      Nose: No rhinorrhea.   Eyes:      Conjunctiva/sclera: Conjunctivae normal.      Pupils: Pupils are equal, round, and reactive to light.   Cardiovascular:      Rate and Rhythm: Normal rate and regular rhythm.   Pulmonary:      Effort: Pulmonary effort is normal.      Comments: Decreased BS throughout  Abdominal:      Palpations: Abdomen is soft.      Tenderness: There is no abdominal tenderness.   Musculoskeletal:         General: No deformity or signs of injury. "   Skin:     General: Skin is warm and dry.   Neurological:      General: No focal deficit present.      Mental Status: He is alert. Mental status is at baseline.   Psychiatric:         Mood and Affect: Mood normal.         Behavior: Behavior normal.           Assessment & Plan:   1. Elevated blood pressure reading in office without diagnosis of hypertension  Comments:  White Coat HTN - see home BP log, averages 110-130/65-80    2. Chronic obstructive pulmonary disease, unspecified COPD type  -     NEBULIZER FOR HOME USE      Follow up in about 6 months (around 6/11/2024) for Wellness. In addition to their scheduled follow up, the patient has also been instructed to follow up on as needed basis.

## 2024-02-15 ENCOUNTER — TELEPHONE (OUTPATIENT)
Dept: NEPHROLOGY | Facility: CLINIC | Age: 79
End: 2024-02-15
Payer: MEDICARE

## 2024-02-16 ENCOUNTER — LAB VISIT (OUTPATIENT)
Dept: LAB | Facility: HOSPITAL | Age: 79
End: 2024-02-16
Attending: INTERNAL MEDICINE
Payer: MEDICARE

## 2024-02-16 ENCOUNTER — TELEPHONE (OUTPATIENT)
Dept: NEPHROLOGY | Facility: CLINIC | Age: 79
End: 2024-02-16
Payer: MEDICARE

## 2024-02-16 DIAGNOSIS — N18.31 STAGE 3A CHRONIC KIDNEY DISEASE: ICD-10-CM

## 2024-02-16 LAB
ALBUMIN SERPL-MCNC: 3.8 G/DL (ref 3.4–4.8)
ALBUMIN/GLOB SERPL: 1.4 RATIO (ref 1.1–2)
ALP SERPL-CCNC: 58 UNIT/L (ref 40–150)
ALT SERPL-CCNC: 13 UNIT/L (ref 0–55)
AMORPH URATE CRY URNS QL MICRO: ABNORMAL /UL
APPEARANCE UR: ABNORMAL
AST SERPL-CCNC: 20 UNIT/L (ref 5–34)
BACTERIA #/AREA URNS AUTO: ABNORMAL /HPF
BASOPHILS # BLD AUTO: 0.06 X10(3)/MCL
BASOPHILS NFR BLD AUTO: 0.7 %
BILIRUB SERPL-MCNC: 0.6 MG/DL
BILIRUB UR QL STRIP.AUTO: NEGATIVE
BUN SERPL-MCNC: 26.2 MG/DL (ref 8.4–25.7)
CALCIUM SERPL-MCNC: 9.5 MG/DL (ref 8.8–10)
CHLORIDE SERPL-SCNC: 101 MMOL/L (ref 98–107)
CO2 SERPL-SCNC: 30 MMOL/L (ref 23–31)
COLOR UR AUTO: YELLOW
CREAT SERPL-MCNC: 1.31 MG/DL (ref 0.73–1.18)
CREAT UR-MCNC: 50.6 MG/DL (ref 63–166)
EOSINOPHIL # BLD AUTO: 0.2 X10(3)/MCL (ref 0–0.9)
EOSINOPHIL NFR BLD AUTO: 2.4 %
ERYTHROCYTE [DISTWIDTH] IN BLOOD BY AUTOMATED COUNT: 13.5 % (ref 11.5–17)
GFR SERPLBLD CREATININE-BSD FMLA CKD-EPI: 56 MLS/MIN/1.73/M2
GLOBULIN SER-MCNC: 2.8 GM/DL (ref 2.4–3.5)
GLUCOSE SERPL-MCNC: 93 MG/DL (ref 82–115)
GLUCOSE UR QL STRIP.AUTO: NORMAL
HCT VFR BLD AUTO: 43.2 % (ref 42–52)
HGB BLD-MCNC: 13.9 G/DL (ref 14–18)
IMM GRANULOCYTES # BLD AUTO: 0.02 X10(3)/MCL (ref 0–0.04)
IMM GRANULOCYTES NFR BLD AUTO: 0.2 %
KETONES UR QL STRIP.AUTO: NEGATIVE
LEUKOCYTE ESTERASE UR QL STRIP.AUTO: 500
LYMPHOCYTES # BLD AUTO: 2.17 X10(3)/MCL (ref 0.6–4.6)
LYMPHOCYTES NFR BLD AUTO: 25.6 %
MCH RBC QN AUTO: 30.2 PG (ref 27–31)
MCHC RBC AUTO-ENTMCNC: 32.2 G/DL (ref 33–36)
MCV RBC AUTO: 93.7 FL (ref 80–94)
MONOCYTES # BLD AUTO: 0.78 X10(3)/MCL (ref 0.1–1.3)
MONOCYTES NFR BLD AUTO: 9.2 %
NEUTROPHILS # BLD AUTO: 5.26 X10(3)/MCL (ref 2.1–9.2)
NEUTROPHILS NFR BLD AUTO: 61.9 %
NITRITE UR QL STRIP.AUTO: NEGATIVE
NRBC BLD AUTO-RTO: 0 %
PH UR STRIP.AUTO: 7.5 [PH]
PHOSPHATE SERPL-MCNC: 4.1 MG/DL (ref 2.3–4.7)
PLATELET # BLD AUTO: 214 X10(3)/MCL (ref 130–400)
PMV BLD AUTO: 9.6 FL (ref 7.4–10.4)
POTASSIUM SERPL-SCNC: 5.1 MMOL/L (ref 3.5–5.1)
PROT SERPL-MCNC: 6.6 GM/DL (ref 5.8–7.6)
PROT UR QL STRIP.AUTO: NEGATIVE
PROT UR STRIP-MCNC: <6.8 MG/DL
PTH-INTACT SERPL-MCNC: 47 PG/ML (ref 8.7–77)
RBC # BLD AUTO: 4.61 X10(6)/MCL (ref 4.7–6.1)
RBC #/AREA URNS AUTO: ABNORMAL /HPF
RBC UR QL AUTO: NEGATIVE
SODIUM SERPL-SCNC: 139 MMOL/L (ref 136–145)
SP GR UR STRIP.AUTO: 1.01 (ref 1–1.03)
SQUAMOUS #/AREA URNS LPF: ABNORMAL /HPF
UROBILINOGEN UR STRIP-ACNC: NORMAL
WBC # SPEC AUTO: 8.49 X10(3)/MCL (ref 4.5–11.5)
WBC #/AREA URNS AUTO: >100 /HPF
WBC CLUMPS UR QL AUTO: ABNORMAL

## 2024-02-16 PROCEDURE — 85025 COMPLETE CBC W/AUTO DIFF WBC: CPT

## 2024-02-16 PROCEDURE — 84100 ASSAY OF PHOSPHORUS: CPT

## 2024-02-16 PROCEDURE — 80053 COMPREHEN METABOLIC PANEL: CPT

## 2024-02-16 PROCEDURE — 87086 URINE CULTURE/COLONY COUNT: CPT

## 2024-02-16 PROCEDURE — 82570 ASSAY OF URINE CREATININE: CPT

## 2024-02-16 PROCEDURE — 36415 COLL VENOUS BLD VENIPUNCTURE: CPT

## 2024-02-16 PROCEDURE — 83970 ASSAY OF PARATHORMONE: CPT

## 2024-02-16 PROCEDURE — 81001 URINALYSIS AUTO W/SCOPE: CPT

## 2024-02-16 NOTE — TELEPHONE ENCOUNTER
----- Message from Silva Burgess MD sent at 2/16/2024 10:25 AM CST -----  Low potassium diet    Called patient with lab results, instructed on low potassium diet. Verbalized understanding.

## 2024-02-18 LAB — BACTERIA UR CULT: ABNORMAL

## 2024-02-19 ENCOUNTER — TELEPHONE (OUTPATIENT)
Dept: NEPHROLOGY | Facility: CLINIC | Age: 79
End: 2024-02-19
Payer: MEDICARE

## 2024-02-19 DIAGNOSIS — N39.0 UTI (URINARY TRACT INFECTION), UNCOMPLICATED: Primary | ICD-10-CM

## 2024-02-19 RX ORDER — AMOXICILLIN AND CLAVULANATE POTASSIUM 500; 125 MG/1; MG/1
1 TABLET, FILM COATED ORAL 2 TIMES DAILY
Qty: 10 TABLET | Refills: 0 | Status: SHIPPED | OUTPATIENT
Start: 2024-02-19 | End: 2024-02-24

## 2024-02-23 DIAGNOSIS — R35.0 BENIGN PROSTATIC HYPERPLASIA WITH URINARY FREQUENCY: Primary | ICD-10-CM

## 2024-02-23 DIAGNOSIS — N40.1 BENIGN PROSTATIC HYPERPLASIA WITH URINARY FREQUENCY: Primary | ICD-10-CM

## 2024-02-23 DIAGNOSIS — G47.00 INSOMNIA, UNSPECIFIED TYPE: ICD-10-CM

## 2024-02-23 RX ORDER — TAMSULOSIN HYDROCHLORIDE 0.4 MG/1
1 CAPSULE ORAL DAILY
Qty: 90 CAPSULE | Refills: 0 | Status: SHIPPED | OUTPATIENT
Start: 2024-02-23 | End: 2024-05-06

## 2024-02-23 RX ORDER — TRAZODONE HYDROCHLORIDE 150 MG/1
150 TABLET ORAL NIGHTLY
Qty: 90 TABLET | Refills: 0 | Status: SHIPPED | OUTPATIENT
Start: 2024-02-23 | End: 2024-02-26

## 2024-02-26 ENCOUNTER — OFFICE VISIT (OUTPATIENT)
Dept: NEPHROLOGY | Facility: CLINIC | Age: 79
End: 2024-02-26
Payer: MEDICARE

## 2024-02-26 VITALS
WEIGHT: 126.19 LBS | HEIGHT: 66 IN | DIASTOLIC BLOOD PRESSURE: 70 MMHG | BODY MASS INDEX: 20.28 KG/M2 | TEMPERATURE: 98 F | OXYGEN SATURATION: 98 % | HEART RATE: 74 BPM | RESPIRATION RATE: 20 BRPM | SYSTOLIC BLOOD PRESSURE: 120 MMHG

## 2024-02-26 DIAGNOSIS — N18.31 STAGE 3A CHRONIC KIDNEY DISEASE: Primary | ICD-10-CM

## 2024-02-26 PROCEDURE — 1159F MED LIST DOCD IN RCRD: CPT | Mod: CPTII,S$GLB,, | Performed by: NURSE PRACTITIONER

## 2024-02-26 PROCEDURE — 3078F DIAST BP <80 MM HG: CPT | Mod: CPTII,S$GLB,, | Performed by: NURSE PRACTITIONER

## 2024-02-26 PROCEDURE — 1101F PT FALLS ASSESS-DOCD LE1/YR: CPT | Mod: CPTII,S$GLB,, | Performed by: NURSE PRACTITIONER

## 2024-02-26 PROCEDURE — 99999 PR PBB SHADOW E&M-EST. PATIENT-LVL IV: CPT | Mod: PBBFAC,,, | Performed by: NURSE PRACTITIONER

## 2024-02-26 PROCEDURE — 99213 OFFICE O/P EST LOW 20 MIN: CPT | Mod: S$GLB,,, | Performed by: NURSE PRACTITIONER

## 2024-02-26 PROCEDURE — 1126F AMNT PAIN NOTED NONE PRSNT: CPT | Mod: CPTII,S$GLB,, | Performed by: NURSE PRACTITIONER

## 2024-02-26 PROCEDURE — 3074F SYST BP LT 130 MM HG: CPT | Mod: CPTII,S$GLB,, | Performed by: NURSE PRACTITIONER

## 2024-02-26 PROCEDURE — 3288F FALL RISK ASSESSMENT DOCD: CPT | Mod: CPTII,S$GLB,, | Performed by: NURSE PRACTITIONER

## 2024-02-26 RX ORDER — ISOPROPYL ALCOHOL 70 ML/100ML
SWAB TOPICAL
COMMUNITY
Start: 2024-02-23 | End: 2024-05-06
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
## 2024-03-26 ENCOUNTER — OFFICE VISIT (OUTPATIENT)
Dept: PRIMARY CARE CLINIC | Facility: CLINIC | Age: 79
End: 2024-03-26
Payer: MEDICARE

## 2024-03-26 VITALS
WEIGHT: 126 LBS | DIASTOLIC BLOOD PRESSURE: 65 MMHG | TEMPERATURE: 98 F | OXYGEN SATURATION: 98 % | BODY MASS INDEX: 20.99 KG/M2 | HEART RATE: 63 BPM | HEIGHT: 65 IN | RESPIRATION RATE: 18 BRPM | SYSTOLIC BLOOD PRESSURE: 105 MMHG

## 2024-03-26 DIAGNOSIS — I71.23 ANEURYSM OF DESCENDING THORACIC AORTA WITHOUT RUPTURE: ICD-10-CM

## 2024-03-26 DIAGNOSIS — R29.898 LEG WEAKNESS, BILATERAL: Primary | ICD-10-CM

## 2024-03-26 DIAGNOSIS — K59.04 CHRONIC IDIOPATHIC CONSTIPATION: Chronic | ICD-10-CM

## 2024-03-26 DIAGNOSIS — F51.01 PRIMARY INSOMNIA: ICD-10-CM

## 2024-03-26 DIAGNOSIS — J44.9 CHRONIC OBSTRUCTIVE PULMONARY DISEASE, UNSPECIFIED COPD TYPE: ICD-10-CM

## 2024-03-26 PROBLEM — G47.00 INSOMNIA: Status: ACTIVE | Noted: 2024-03-26

## 2024-03-26 PROCEDURE — 3078F DIAST BP <80 MM HG: CPT | Mod: CPTII,,, | Performed by: STUDENT IN AN ORGANIZED HEALTH CARE EDUCATION/TRAINING PROGRAM

## 2024-03-26 PROCEDURE — 1160F RVW MEDS BY RX/DR IN RCRD: CPT | Mod: CPTII,,, | Performed by: STUDENT IN AN ORGANIZED HEALTH CARE EDUCATION/TRAINING PROGRAM

## 2024-03-26 PROCEDURE — 1100F PTFALLS ASSESS-DOCD GE2>/YR: CPT | Mod: CPTII,,, | Performed by: STUDENT IN AN ORGANIZED HEALTH CARE EDUCATION/TRAINING PROGRAM

## 2024-03-26 PROCEDURE — 3074F SYST BP LT 130 MM HG: CPT | Mod: CPTII,,, | Performed by: STUDENT IN AN ORGANIZED HEALTH CARE EDUCATION/TRAINING PROGRAM

## 2024-03-26 PROCEDURE — 1159F MED LIST DOCD IN RCRD: CPT | Mod: CPTII,,, | Performed by: STUDENT IN AN ORGANIZED HEALTH CARE EDUCATION/TRAINING PROGRAM

## 2024-03-26 PROCEDURE — 3288F FALL RISK ASSESSMENT DOCD: CPT | Mod: CPTII,,, | Performed by: STUDENT IN AN ORGANIZED HEALTH CARE EDUCATION/TRAINING PROGRAM

## 2024-03-26 PROCEDURE — 99214 OFFICE O/P EST MOD 30 MIN: CPT | Mod: ,,, | Performed by: STUDENT IN AN ORGANIZED HEALTH CARE EDUCATION/TRAINING PROGRAM

## 2024-03-26 RX ORDER — ESZOPICLONE 1 MG/1
1 TABLET, FILM COATED ORAL NIGHTLY PRN
Qty: 30 TABLET | Refills: 5 | Status: SHIPPED | OUTPATIENT
Start: 2024-03-26

## 2024-03-26 RX ORDER — BUDESONIDE, GLYCOPYRROLATE, AND FORMOTEROL FUMARATE 160; 9; 4.8 UG/1; UG/1; UG/1
1 AEROSOL, METERED RESPIRATORY (INHALATION) 2 TIMES DAILY
Qty: 10.7 G | Refills: 11 | Status: SHIPPED | OUTPATIENT
Start: 2024-03-26

## 2024-03-26 NOTE — PROGRESS NOTES
"Subjective:       Patient ID: Gustavo Lanza is a 78 y.o. male.    -------------------------------------    Atherosclerosis of arteries of extremities    BPH (benign prostatic hyperplasia)    Carotid artery stenosis    Chronic idiopathic constipation    COPD (chronic obstructive pulmonary disease)    Coronary artery disease involving native coronary artery of native heart without angina pectoris    Impaired fasting glucose    Mixed hyperlipidemia    Raynaud disease    S/P AAA repair        Chief Complaint: Fall and Constipation    C/o falls - legs "tingle" then go numb and get "hot", present for years. Happens at least 4-5 times/month. Symptoms equal B/L, progresses from tingling to numb to hot then fall in about 1 minute. Usually can get back up after about 3-4 minutes.     Constipation - follows GI (Dr. Dewey)      Review of Systems   Constitutional:  Negative for chills and fever.   HENT:  Negative for sinus pressure/congestion and sore throat.    Respiratory:  Negative for cough, shortness of breath and wheezing.    Cardiovascular:  Negative for chest pain and leg swelling.   Gastrointestinal:  Negative for abdominal pain, nausea and vomiting.   Genitourinary:  Negative for dysuria and hematuria.   Musculoskeletal:  Negative for arthralgias and myalgias.   Integumentary:  Negative for rash.   Neurological:  Negative for dizziness and syncope.   Hematological:  Negative for adenopathy.   Psychiatric/Behavioral:  Negative for confusion. The patient is not nervous/anxious.            Objective:      Physical Exam  Vitals and nursing note reviewed.   Constitutional:       General: He is not in acute distress.  HENT:      Head: Normocephalic.      Nose: No rhinorrhea.   Eyes:      Conjunctiva/sclera: Conjunctivae normal.      Pupils: Pupils are equal, round, and reactive to light.   Cardiovascular:      Rate and Rhythm: Normal rate and regular rhythm.      Heart sounds: Murmur (systolic) heard.      Comments: " Normal PT and DP pulses B/L  Pulmonary:      Effort: Pulmonary effort is normal.      Comments: Decreased BS throughout  Abdominal:      Palpations: Abdomen is soft.      Tenderness: There is no abdominal tenderness.   Musculoskeletal:         General: No deformity or signs of injury.   Skin:     General: Skin is warm and dry.   Neurological:      General: No focal deficit present.      Mental Status: He is alert. Mental status is at baseline.   Psychiatric:         Mood and Affect: Mood normal.         Behavior: Behavior normal.           Assessment & Plan:   1. Leg weakness, bilateral  Assessment & Plan:  Intermittent lower extremity paresthesias followed by profound leg weakness causing falls.  Significant PAD history (stents, popliteal revascularization, etc) but normal pulses, skin tone, skin temperature, etc.  Start with XR lumbar spine concern for spinal stenosis.  Consider MRI lumbar spine  Consider Neuro referral (EMG?) if testing unrevealing    Orders:  -     X-Ray Lumbar Spine AP And Lateral; Future; Expected date: 03/26/2024    2. Chronic obstructive pulmonary disease, unspecified COPD type  -     budesonide-glycopyr-formoterol (BREZTRI AEROSPHERE) 160-9-4.8 mcg/actuation HFAA; Inhale 1 puff into the lungs 2 (two) times a day.  Dispense: 10.7 g; Refill: 11    3. Primary insomnia  Assessment & Plan:  Taking trazodone 150 mg nightly w/only partial benefit.   Discontinue trazodone  Trial of Lunesta 1 mg nightly    Orders:  -     eszopiclone (LUNESTA) 1 MG Tab; Take 1 tablet (1 mg total) by mouth nightly as needed (insomnia).  Dispense: 30 tablet; Refill: 5    4. Chronic idiopathic constipation  Assessment & Plan:  Tried docusate sodium, dulcolax, and Miralax. Still with constipation. Recommended he f/u with his GI      5. Aneurysm of descending thoracic aorta without rupture  Assessment & Plan:  Follows Cardiology and Vascular (Dr. Luna). BP controlled. Defer management to Vascular        Keep scheduled  f/u. In addition to their scheduled follow up, the patient has also been instructed to follow up on as needed basis.

## 2024-03-26 NOTE — ASSESSMENT & PLAN NOTE
Intermittent lower extremity paresthesias followed by profound leg weakness causing falls.  Significant PAD history (stents, popliteal revascularization, etc) but normal pulses, skin tone, skin temperature, etc.  Start with XR lumbar spine concern for spinal stenosis.  Consider MRI lumbar spine  Consider Neuro referral (EMG?) if testing unrevealing

## 2024-03-26 NOTE — ASSESSMENT & PLAN NOTE
Tried docusate sodium, dulcolax, and Miralax. Still with constipation. Recommended he f/u with his GI

## 2024-03-26 NOTE — ASSESSMENT & PLAN NOTE
Taking trazodone 150 mg nightly w/only partial benefit.   Discontinue trazodone  Trial of Lunesta 1 mg nightly

## 2024-03-28 ENCOUNTER — TELEPHONE (OUTPATIENT)
Dept: PRIMARY CARE CLINIC | Facility: CLINIC | Age: 79
End: 2024-03-28
Payer: MEDICARE

## 2024-03-28 NOTE — TELEPHONE ENCOUNTER
Okay to take 2 tabs of Lunesta per night (2 mg total).     He needs to let me know on Monday if this works so I can prescribe the higher dose tablets.    Thanks  Gabino Pimentel MD

## 2024-03-28 NOTE — TELEPHONE ENCOUNTER
----- Message from Alley Wright LPN sent at 3/28/2024  1:01 PM CDT -----  Pt states he has taken Lunesta for the past 3 nights and he is still unable to sleep. Asking if there are any other options.   ----- Message -----  From: Surekha Ames  Sent: 3/28/2024  11:38 AM CDT  To: [facility] Lloyd Staff    .Type:  Needs Medical Advice    Who Called: pt  Symptoms (please be specific):    How long has patient had these symptoms:    Pharmacy name and phone #:    Would the patient rather a call back or a response via [facility]?   Best Call Back Number: [phone]  Additional Information: eszopiclone (LUNESTA) 1 MG Tab pt said it not working

## 2024-04-01 ENCOUNTER — TELEPHONE (OUTPATIENT)
Dept: PRIMARY CARE CLINIC | Facility: CLINIC | Age: 79
End: 2024-04-01
Payer: MEDICARE

## 2024-04-01 NOTE — TELEPHONE ENCOUNTER
Ok to take 3 tabs per night (3 mg total). This is the maximum dose of Lunesta. If 3 mg not effective, we'll have to find a new medication.     As before, needs to let us know in 2-3 days if it's working so I can send the higher strength Rx.    Thanks    Gabino Pimentel MD

## 2024-04-01 NOTE — TELEPHONE ENCOUNTER
----- Message from Alley Wright LPN sent at 4/1/2024 11:57 AM CDT -----  Regarding: FW: Patient Call  Pt states he took 2 tablets for the past few nights and he is still waking multiple times per night. Please advise.   ----- Message -----  From: Jazzmine Martino  Sent: 4/1/2024  11:47 AM CDT  To: [facility] Staff  Subject: Patient Call                                     .Type:  Patient Returning Call    Who Called:pt  Who Left Message for Patient:Alley   Does the patient know what this is regarding?:unable to sleep   Would the patient rather a call back or a response via [facility]?   Best Call Back Number:[phone]  Additional Information: pt states eszopiclone (LUNESTA) 1 MG Tab is not him sleep, he stated he have taken 2 tablets and nothing is working, please advise

## 2024-04-11 ENCOUNTER — LAB VISIT (OUTPATIENT)
Dept: LAB | Facility: HOSPITAL | Age: 79
End: 2024-04-11
Attending: INTERNAL MEDICINE
Payer: MEDICARE

## 2024-04-11 DIAGNOSIS — I25.10 CORONARY ATHEROSCLEROSIS OF NATIVE CORONARY ARTERY: Primary | ICD-10-CM

## 2024-04-11 DIAGNOSIS — R53.83 FATIGUE, UNSPECIFIED TYPE: ICD-10-CM

## 2024-04-11 DIAGNOSIS — E78.00 PURE HYPERCHOLESTEROLEMIA: ICD-10-CM

## 2024-04-11 LAB
ALBUMIN SERPL-MCNC: 3.6 G/DL (ref 3.4–4.8)
ALBUMIN/GLOB SERPL: 1.2 RATIO (ref 1.1–2)
ALP SERPL-CCNC: 58 UNIT/L (ref 40–150)
ALT SERPL-CCNC: 16 UNIT/L (ref 0–55)
AST SERPL-CCNC: 22 UNIT/L (ref 5–34)
BILIRUB SERPL-MCNC: 0.5 MG/DL
BUN SERPL-MCNC: 25.9 MG/DL (ref 8.4–25.7)
CALCIUM SERPL-MCNC: 9.2 MG/DL (ref 8.8–10)
CHLORIDE SERPL-SCNC: 105 MMOL/L (ref 98–107)
CHOLEST SERPL-MCNC: 186 MG/DL
CHOLEST/HDLC SERPL: 4 {RATIO} (ref 0–5)
CO2 SERPL-SCNC: 29 MMOL/L (ref 23–31)
CREAT SERPL-MCNC: 1.36 MG/DL (ref 0.73–1.18)
GFR SERPLBLD CREATININE-BSD FMLA CKD-EPI: 54 MLS/MIN/1.73/M2
GLOBULIN SER-MCNC: 2.9 GM/DL (ref 2.4–3.5)
GLUCOSE SERPL-MCNC: 102 MG/DL (ref 82–115)
HDLC SERPL-MCNC: 45 MG/DL (ref 35–60)
LDLC SERPL CALC-MCNC: 126 MG/DL (ref 50–140)
POTASSIUM SERPL-SCNC: 4.6 MMOL/L (ref 3.5–5.1)
PROT SERPL-MCNC: 6.5 GM/DL (ref 5.8–7.6)
SODIUM SERPL-SCNC: 140 MMOL/L (ref 136–145)
TRIGL SERPL-MCNC: 73 MG/DL (ref 34–140)
VLDLC SERPL CALC-MCNC: 15 MG/DL

## 2024-04-11 PROCEDURE — 36415 COLL VENOUS BLD VENIPUNCTURE: CPT

## 2024-04-11 PROCEDURE — 80061 LIPID PANEL: CPT

## 2024-04-11 PROCEDURE — 80053 COMPREHEN METABOLIC PANEL: CPT

## 2024-04-24 ENCOUNTER — LAB VISIT (OUTPATIENT)
Dept: LAB | Facility: HOSPITAL | Age: 79
End: 2024-04-24
Attending: INTERNAL MEDICINE
Payer: MEDICARE

## 2024-04-24 DIAGNOSIS — N40.1 BENIGN PROSTATIC HYPERPLASIA WITH URINARY FREQUENCY: Chronic | ICD-10-CM

## 2024-04-24 DIAGNOSIS — R35.0 BENIGN PROSTATIC HYPERPLASIA WITH URINARY FREQUENCY: Chronic | ICD-10-CM

## 2024-04-24 DIAGNOSIS — E78.00 PURE HYPERCHOLESTEROLEMIA: Primary | ICD-10-CM

## 2024-04-24 LAB
ALBUMIN SERPL-MCNC: 3.7 G/DL (ref 3.4–4.8)
ALBUMIN/GLOB SERPL: 1.3 RATIO (ref 1.1–2)
ALP SERPL-CCNC: 58 UNIT/L (ref 40–150)
ALT SERPL-CCNC: 15 UNIT/L (ref 0–55)
AST SERPL-CCNC: 22 UNIT/L (ref 5–34)
BILIRUB SERPL-MCNC: 0.4 MG/DL
BUN SERPL-MCNC: 19.5 MG/DL (ref 8.4–25.7)
CALCIUM SERPL-MCNC: 9.1 MG/DL (ref 8.8–10)
CHLORIDE SERPL-SCNC: 104 MMOL/L (ref 98–107)
CHOLEST SERPL-MCNC: 184 MG/DL
CHOLEST/HDLC SERPL: 4 {RATIO} (ref 0–5)
CO2 SERPL-SCNC: 33 MMOL/L (ref 23–31)
CREAT SERPL-MCNC: 1.37 MG/DL (ref 0.73–1.18)
GFR SERPLBLD CREATININE-BSD FMLA CKD-EPI: 53 MLS/MIN/1.73/M2
GLOBULIN SER-MCNC: 2.9 GM/DL (ref 2.4–3.5)
GLUCOSE SERPL-MCNC: 114 MG/DL (ref 82–115)
HDLC SERPL-MCNC: 44 MG/DL (ref 35–60)
LDLC SERPL CALC-MCNC: 126 MG/DL (ref 50–140)
POTASSIUM SERPL-SCNC: 4.5 MMOL/L (ref 3.5–5.1)
PROT SERPL-MCNC: 6.6 GM/DL (ref 5.8–7.6)
SODIUM SERPL-SCNC: 143 MMOL/L (ref 136–145)
TRIGL SERPL-MCNC: 71 MG/DL (ref 34–140)
VLDLC SERPL CALC-MCNC: 14 MG/DL

## 2024-04-24 PROCEDURE — 80061 LIPID PANEL: CPT

## 2024-04-24 PROCEDURE — 36415 COLL VENOUS BLD VENIPUNCTURE: CPT

## 2024-04-24 PROCEDURE — 80053 COMPREHEN METABOLIC PANEL: CPT

## 2024-04-24 RX ORDER — FINASTERIDE 5 MG/1
5 TABLET, FILM COATED ORAL DAILY
Qty: 90 TABLET | Refills: 3 | Status: SHIPPED | OUTPATIENT
Start: 2024-04-24 | End: 2025-04-24

## 2024-04-30 ENCOUNTER — APPOINTMENT (OUTPATIENT)
Dept: LAB | Facility: HOSPITAL | Age: 79
End: 2024-04-30
Attending: INTERNAL MEDICINE
Payer: MEDICARE

## 2024-04-30 DIAGNOSIS — E78.00 PURE HYPERCHOLESTEROLEMIA: Primary | ICD-10-CM

## 2024-04-30 LAB
ALBUMIN SERPL-MCNC: 3.8 G/DL (ref 3.4–4.8)
ALBUMIN/GLOB SERPL: 1.2 RATIO (ref 1.1–2)
ALP SERPL-CCNC: 60 UNIT/L (ref 40–150)
ALT SERPL-CCNC: 15 UNIT/L (ref 0–55)
AST SERPL-CCNC: 22 UNIT/L (ref 5–34)
BILIRUB SERPL-MCNC: 0.4 MG/DL
BUN SERPL-MCNC: 26.6 MG/DL (ref 8.4–25.7)
CALCIUM SERPL-MCNC: 9 MG/DL (ref 8.8–10)
CHLORIDE SERPL-SCNC: 104 MMOL/L (ref 98–107)
CHOLEST SERPL-MCNC: 180 MG/DL
CHOLEST/HDLC SERPL: 4 {RATIO} (ref 0–5)
CO2 SERPL-SCNC: 29 MMOL/L (ref 23–31)
CREAT SERPL-MCNC: 1.54 MG/DL (ref 0.73–1.18)
GFR SERPLBLD CREATININE-BSD FMLA CKD-EPI: 46 MLS/MIN/1.73/M2
GLOBULIN SER-MCNC: 3.2 GM/DL (ref 2.4–3.5)
GLUCOSE SERPL-MCNC: 114 MG/DL (ref 82–115)
HDLC SERPL-MCNC: 44 MG/DL (ref 35–60)
LDLC SERPL CALC-MCNC: 113 MG/DL (ref 50–140)
POTASSIUM SERPL-SCNC: 4.6 MMOL/L (ref 3.5–5.1)
PROT SERPL-MCNC: 7 GM/DL (ref 5.8–7.6)
SODIUM SERPL-SCNC: 142 MMOL/L (ref 136–145)
TRIGL SERPL-MCNC: 114 MG/DL (ref 34–140)
VLDLC SERPL CALC-MCNC: 23 MG/DL

## 2024-04-30 PROCEDURE — 80061 LIPID PANEL: CPT

## 2024-04-30 PROCEDURE — 36415 COLL VENOUS BLD VENIPUNCTURE: CPT

## 2024-04-30 PROCEDURE — 80053 COMPREHEN METABOLIC PANEL: CPT

## 2024-05-01 DIAGNOSIS — F51.01 PRIMARY INSOMNIA: ICD-10-CM

## 2024-05-01 DIAGNOSIS — J44.9 CHRONIC OBSTRUCTIVE PULMONARY DISEASE, UNSPECIFIED COPD TYPE: Primary | Chronic | ICD-10-CM

## 2024-05-01 RX ORDER — ALBUTEROL SULFATE 90 UG/1
2 AEROSOL, METERED RESPIRATORY (INHALATION) EVERY 6 HOURS PRN
Qty: 18 G | Refills: 5 | Status: SHIPPED | OUTPATIENT
Start: 2024-05-01

## 2024-05-01 RX ORDER — ESZOPICLONE 1 MG/1
1 TABLET, FILM COATED ORAL NIGHTLY PRN
Qty: 30 TABLET | Refills: 5 | OUTPATIENT
Start: 2024-05-01

## 2024-05-06 DIAGNOSIS — R35.0 BENIGN PROSTATIC HYPERPLASIA WITH URINARY FREQUENCY: ICD-10-CM

## 2024-05-06 DIAGNOSIS — N40.1 BENIGN PROSTATIC HYPERPLASIA WITH URINARY FREQUENCY: ICD-10-CM

## 2024-05-06 RX ORDER — ISOPROPYL ALCOHOL 70 ML/100ML
SWAB TOPICAL
Qty: 99 EACH | Refills: 3 | Status: SHIPPED | OUTPATIENT
Start: 2024-05-06

## 2024-05-06 RX ORDER — TAMSULOSIN HYDROCHLORIDE 0.4 MG/1
1 CAPSULE ORAL
Qty: 90 CAPSULE | Refills: 3 | Status: SHIPPED | OUTPATIENT
Start: 2024-05-06

## 2024-05-14 ENCOUNTER — LAB VISIT (OUTPATIENT)
Dept: LAB | Facility: HOSPITAL | Age: 79
End: 2024-05-14
Attending: SURGERY
Payer: MEDICARE

## 2024-05-14 DIAGNOSIS — I70.213 ATHEROSCLEROSIS OF NATIVE ARTERY OF BOTH LOWER EXTREMITIES WITH INTERMITTENT CLAUDICATION: ICD-10-CM

## 2024-05-14 DIAGNOSIS — Z01.810 PRE-OPERATIVE CARDIOVASCULAR EXAMINATION: Primary | ICD-10-CM

## 2024-05-14 LAB
ANION GAP SERPL CALC-SCNC: 5 MEQ/L
BASOPHILS # BLD AUTO: 0.07 X10(3)/MCL
BASOPHILS NFR BLD AUTO: 0.8 %
BUN SERPL-MCNC: 22.5 MG/DL (ref 8.4–25.7)
CALCIUM SERPL-MCNC: 9 MG/DL (ref 8.8–10)
CHLORIDE SERPL-SCNC: 107 MMOL/L (ref 98–107)
CO2 SERPL-SCNC: 31 MMOL/L (ref 23–31)
CREAT SERPL-MCNC: 1.2 MG/DL (ref 0.73–1.18)
CREAT/UREA NIT SERPL: 19
EOSINOPHIL # BLD AUTO: 0.35 X10(3)/MCL (ref 0–0.9)
EOSINOPHIL NFR BLD AUTO: 3.8 %
ERYTHROCYTE [DISTWIDTH] IN BLOOD BY AUTOMATED COUNT: 13.3 % (ref 11.5–17)
GFR SERPLBLD CREATININE-BSD FMLA CKD-EPI: >60 ML/MIN/1.73/M2
GLUCOSE SERPL-MCNC: 97 MG/DL (ref 82–115)
HCT VFR BLD AUTO: 41.5 % (ref 42–52)
HGB BLD-MCNC: 13.8 G/DL (ref 14–18)
IMM GRANULOCYTES # BLD AUTO: 0.03 X10(3)/MCL (ref 0–0.04)
IMM GRANULOCYTES NFR BLD AUTO: 0.3 %
LYMPHOCYTES # BLD AUTO: 2.11 X10(3)/MCL (ref 0.6–4.6)
LYMPHOCYTES NFR BLD AUTO: 22.6 %
MCH RBC QN AUTO: 31.4 PG (ref 27–31)
MCHC RBC AUTO-ENTMCNC: 33.3 G/DL (ref 33–36)
MCV RBC AUTO: 94.3 FL (ref 80–94)
MONOCYTES # BLD AUTO: 0.98 X10(3)/MCL (ref 0.1–1.3)
MONOCYTES NFR BLD AUTO: 10.5 %
NEUTROPHILS # BLD AUTO: 5.79 X10(3)/MCL (ref 2.1–9.2)
NEUTROPHILS NFR BLD AUTO: 62 %
NRBC BLD AUTO-RTO: 0 %
PLATELET # BLD AUTO: 216 X10(3)/MCL (ref 130–400)
PMV BLD AUTO: 9.8 FL (ref 7.4–10.4)
POTASSIUM SERPL-SCNC: 4.5 MMOL/L (ref 3.5–5.1)
RBC # BLD AUTO: 4.4 X10(6)/MCL (ref 4.7–6.1)
SODIUM SERPL-SCNC: 143 MMOL/L (ref 136–145)
WBC # SPEC AUTO: 9.33 X10(3)/MCL (ref 4.5–11.5)

## 2024-05-14 PROCEDURE — 80048 BASIC METABOLIC PNL TOTAL CA: CPT

## 2024-05-14 PROCEDURE — 36415 COLL VENOUS BLD VENIPUNCTURE: CPT

## 2024-05-14 PROCEDURE — 85025 COMPLETE CBC W/AUTO DIFF WBC: CPT

## 2024-05-15 DIAGNOSIS — Z98.890 S/P AAA REPAIR: Primary | ICD-10-CM

## 2024-05-15 DIAGNOSIS — Z86.79 S/P AAA REPAIR: Primary | ICD-10-CM

## 2024-06-04 DIAGNOSIS — I73.9 PERIPHERAL VASCULAR DISEASE, UNSPECIFIED: ICD-10-CM

## 2024-06-04 DIAGNOSIS — R80.9 MICROALBUMINURIA: Primary | ICD-10-CM

## 2024-06-04 DIAGNOSIS — Z00.00 WELL ADULT EXAM: Primary | ICD-10-CM

## 2024-06-04 DIAGNOSIS — Z12.5 SCREENING FOR MALIGNANT NEOPLASM OF PROSTATE: ICD-10-CM

## 2024-06-05 ENCOUNTER — TELEPHONE (OUTPATIENT)
Dept: PRIMARY CARE CLINIC | Facility: CLINIC | Age: 79
End: 2024-06-05
Payer: MEDICARE

## 2024-06-05 NOTE — TELEPHONE ENCOUNTER
1.Are there any outstanding task in patient chart? Y, labs. Pt stated he will have labs drawn 1-2 days prior to appt.         2. Do we have outstanding/pending referrals? n        3. Has the patient been seen in an ER, Urgent Care, or admitted since last visit? n        4. Has patient seen any other healthcare providers since last visit? n        5. Has patient had any blood work or xrays done since last visit? n     6. Is the patient's pneumonia vaccine current? N/a  Prevnar 13:  Pneumonia 20:  Pneumonia 23:     7. When was the patient's colonoscopy? 11/12/21     8. When was the patient's last mamogram? N/a     9. When was the patient's last cervical screening/PAP smear? N/a     10. When was the patient's last bone scan? N/a      11. When was the patient's last diabetic screening?  A1c:  5/31/23  Micro: 6/4/23  Eye Exam: n/a

## 2024-06-10 ENCOUNTER — LAB VISIT (OUTPATIENT)
Dept: LAB | Facility: HOSPITAL | Age: 79
End: 2024-06-10
Attending: STUDENT IN AN ORGANIZED HEALTH CARE EDUCATION/TRAINING PROGRAM
Payer: MEDICARE

## 2024-06-10 DIAGNOSIS — R80.9 MICROALBUMINURIA: ICD-10-CM

## 2024-06-10 DIAGNOSIS — Z00.00 WELL ADULT EXAM: ICD-10-CM

## 2024-06-10 DIAGNOSIS — Z12.5 SCREENING FOR MALIGNANT NEOPLASM OF PROSTATE: ICD-10-CM

## 2024-06-10 LAB
ALBUMIN SERPL-MCNC: 3.2 G/DL (ref 3.4–4.8)
ALBUMIN/GLOB SERPL: 1 RATIO (ref 1.1–2)
ALP SERPL-CCNC: 109 UNIT/L (ref 40–150)
ALT SERPL-CCNC: 24 UNIT/L (ref 0–55)
ANION GAP SERPL CALC-SCNC: 7 MEQ/L
AST SERPL-CCNC: 23 UNIT/L (ref 5–34)
BACTERIA #/AREA URNS AUTO: ABNORMAL /HPF
BASOPHILS # BLD AUTO: 0.08 X10(3)/MCL
BASOPHILS NFR BLD AUTO: 0.7 %
BILIRUB SERPL-MCNC: 0.4 MG/DL
BILIRUB UR QL STRIP.AUTO: NEGATIVE
BUN SERPL-MCNC: 21.1 MG/DL (ref 8.4–25.7)
CALCIUM SERPL-MCNC: 9.7 MG/DL (ref 8.8–10)
CHLORIDE SERPL-SCNC: 104 MMOL/L (ref 98–107)
CHOLEST SERPL-MCNC: 167 MG/DL
CHOLEST/HDLC SERPL: 5 {RATIO} (ref 0–5)
CLARITY UR: ABNORMAL
CO2 SERPL-SCNC: 30 MMOL/L (ref 23–31)
COLOR UR AUTO: YELLOW
CREAT SERPL-MCNC: 1.24 MG/DL (ref 0.73–1.18)
CREAT/UREA NIT SERPL: 17
EOSINOPHIL # BLD AUTO: 0.36 X10(3)/MCL (ref 0–0.9)
EOSINOPHIL NFR BLD AUTO: 3.1 %
ERYTHROCYTE [DISTWIDTH] IN BLOOD BY AUTOMATED COUNT: 13.5 % (ref 11.5–17)
GFR SERPLBLD CREATININE-BSD FMLA CKD-EPI: 60 ML/MIN/1.73/M2
GLOBULIN SER-MCNC: 3.2 GM/DL (ref 2.4–3.5)
GLUCOSE SERPL-MCNC: 112 MG/DL (ref 82–115)
GLUCOSE UR QL STRIP: NORMAL
HCT VFR BLD AUTO: 41.6 % (ref 42–52)
HDLC SERPL-MCNC: 35 MG/DL (ref 35–60)
HGB BLD-MCNC: 13.2 G/DL (ref 14–18)
HGB UR QL STRIP: ABNORMAL
IMM GRANULOCYTES # BLD AUTO: 0.05 X10(3)/MCL (ref 0–0.04)
IMM GRANULOCYTES NFR BLD AUTO: 0.4 %
KETONES UR QL STRIP: NEGATIVE
LDLC SERPL CALC-MCNC: 108 MG/DL (ref 50–140)
LEUKOCYTE ESTERASE UR QL STRIP: 500
LYMPHOCYTES # BLD AUTO: 2.37 X10(3)/MCL (ref 0.6–4.6)
LYMPHOCYTES NFR BLD AUTO: 20.3 %
MCH RBC QN AUTO: 30 PG (ref 27–31)
MCHC RBC AUTO-ENTMCNC: 31.7 G/DL (ref 33–36)
MCV RBC AUTO: 94.5 FL (ref 80–94)
MONOCYTES # BLD AUTO: 0.9 X10(3)/MCL (ref 0.1–1.3)
MONOCYTES NFR BLD AUTO: 7.7 %
NEUTROPHILS # BLD AUTO: 7.9 X10(3)/MCL (ref 2.1–9.2)
NEUTROPHILS NFR BLD AUTO: 67.8 %
NITRITE UR QL STRIP: ABNORMAL
NRBC BLD AUTO-RTO: 0 %
PH UR STRIP: 7.5 [PH]
PLATELET # BLD AUTO: 378 X10(3)/MCL (ref 130–400)
PMV BLD AUTO: 9.5 FL (ref 7.4–10.4)
POTASSIUM SERPL-SCNC: 4.4 MMOL/L (ref 3.5–5.1)
PROT SERPL-MCNC: 6.4 GM/DL (ref 5.8–7.6)
PROT UR QL STRIP: ABNORMAL
PSA SERPL-MCNC: 1.08 NG/ML
RBC # BLD AUTO: 4.4 X10(6)/MCL (ref 4.7–6.1)
RBC #/AREA URNS AUTO: ABNORMAL /HPF
SODIUM SERPL-SCNC: 141 MMOL/L (ref 136–145)
SP GR UR STRIP.AUTO: 1.01 (ref 1–1.03)
SQUAMOUS #/AREA URNS LPF: ABNORMAL /HPF
TRIGL SERPL-MCNC: 119 MG/DL (ref 34–140)
TSH SERPL-ACNC: 1.75 UIU/ML (ref 0.35–4.94)
UROBILINOGEN UR STRIP-ACNC: NORMAL
VLDLC SERPL CALC-MCNC: 24 MG/DL
WBC # SPEC AUTO: 11.66 X10(3)/MCL (ref 4.5–11.5)
WBC #/AREA URNS AUTO: >100 /HPF

## 2024-06-10 PROCEDURE — 80061 LIPID PANEL: CPT

## 2024-06-10 PROCEDURE — 85025 COMPLETE CBC W/AUTO DIFF WBC: CPT

## 2024-06-10 PROCEDURE — 81001 URINALYSIS AUTO W/SCOPE: CPT

## 2024-06-10 PROCEDURE — 84153 ASSAY OF PSA TOTAL: CPT

## 2024-06-10 PROCEDURE — 84443 ASSAY THYROID STIM HORMONE: CPT

## 2024-06-10 PROCEDURE — 80053 COMPREHEN METABOLIC PANEL: CPT

## 2024-06-10 PROCEDURE — 36415 COLL VENOUS BLD VENIPUNCTURE: CPT

## 2024-06-10 PROCEDURE — 87086 URINE CULTURE/COLONY COUNT: CPT

## 2024-06-12 ENCOUNTER — OFFICE VISIT (OUTPATIENT)
Dept: PRIMARY CARE CLINIC | Facility: CLINIC | Age: 79
End: 2024-06-12
Payer: MEDICARE

## 2024-06-12 VITALS
TEMPERATURE: 98 F | OXYGEN SATURATION: 98 % | BODY MASS INDEX: 20.83 KG/M2 | HEART RATE: 71 BPM | WEIGHT: 125 LBS | SYSTOLIC BLOOD PRESSURE: 89 MMHG | HEIGHT: 65 IN | DIASTOLIC BLOOD PRESSURE: 56 MMHG | RESPIRATION RATE: 18 BRPM

## 2024-06-12 DIAGNOSIS — Z00.00 MEDICARE ANNUAL WELLNESS VISIT, SUBSEQUENT: Primary | ICD-10-CM

## 2024-06-12 DIAGNOSIS — N30.00 ACUTE CYSTITIS WITHOUT HEMATURIA: ICD-10-CM

## 2024-06-12 DIAGNOSIS — Z23 NEED FOR SHINGLES VACCINE: ICD-10-CM

## 2024-06-12 DIAGNOSIS — M48.07 SPINAL STENOSIS, LUMBOSACRAL REGION: ICD-10-CM

## 2024-06-12 LAB — BACTERIA UR CULT: ABNORMAL

## 2024-06-12 PROCEDURE — 3074F SYST BP LT 130 MM HG: CPT | Mod: CPTII,,, | Performed by: STUDENT IN AN ORGANIZED HEALTH CARE EDUCATION/TRAINING PROGRAM

## 2024-06-12 PROCEDURE — 1101F PT FALLS ASSESS-DOCD LE1/YR: CPT | Mod: CPTII,,, | Performed by: STUDENT IN AN ORGANIZED HEALTH CARE EDUCATION/TRAINING PROGRAM

## 2024-06-12 PROCEDURE — 3078F DIAST BP <80 MM HG: CPT | Mod: CPTII,,, | Performed by: STUDENT IN AN ORGANIZED HEALTH CARE EDUCATION/TRAINING PROGRAM

## 2024-06-12 PROCEDURE — 1124F ACP DISCUSS-NO DSCNMKR DOCD: CPT | Mod: CPTII,,, | Performed by: STUDENT IN AN ORGANIZED HEALTH CARE EDUCATION/TRAINING PROGRAM

## 2024-06-12 PROCEDURE — G0439 PPPS, SUBSEQ VISIT: HCPCS | Mod: ,,, | Performed by: STUDENT IN AN ORGANIZED HEALTH CARE EDUCATION/TRAINING PROGRAM

## 2024-06-12 PROCEDURE — 1160F RVW MEDS BY RX/DR IN RCRD: CPT | Mod: CPTII,,, | Performed by: STUDENT IN AN ORGANIZED HEALTH CARE EDUCATION/TRAINING PROGRAM

## 2024-06-12 PROCEDURE — 3288F FALL RISK ASSESSMENT DOCD: CPT | Mod: CPTII,,, | Performed by: STUDENT IN AN ORGANIZED HEALTH CARE EDUCATION/TRAINING PROGRAM

## 2024-06-12 PROCEDURE — 1159F MED LIST DOCD IN RCRD: CPT | Mod: CPTII,,, | Performed by: STUDENT IN AN ORGANIZED HEALTH CARE EDUCATION/TRAINING PROGRAM

## 2024-06-12 RX ORDER — LANCETS 33 GAUGE
1 EACH MISCELLANEOUS DAILY
COMMUNITY
Start: 2024-05-13 | End: 2024-07-09

## 2024-06-12 RX ORDER — DIPHENHYDRAMINE HCL 25 MG
TABLET ORAL
COMMUNITY
Start: 2024-03-01 | End: 2024-07-09

## 2024-06-12 RX ORDER — TRAZODONE HYDROCHLORIDE 150 MG/1
1 TABLET ORAL NIGHTLY
COMMUNITY
End: 2024-06-20 | Stop reason: SDUPTHER

## 2024-06-12 RX ORDER — AMOXICILLIN AND CLAVULANATE POTASSIUM 875; 125 MG/1; MG/1
1 TABLET, FILM COATED ORAL 2 TIMES DAILY
Qty: 20 TABLET | Refills: 0 | Status: SHIPPED | OUTPATIENT
Start: 2024-06-12 | End: 2024-06-22

## 2024-06-12 RX ORDER — CALCIUM CITRATE/VITAMIN D3 200MG-6.25
1 TABLET ORAL DAILY
COMMUNITY
Start: 2024-05-13 | End: 2024-07-09

## 2024-06-12 RX ORDER — DOXEPIN HYDROCHLORIDE 10 MG/1
1 CAPSULE ORAL NIGHTLY
COMMUNITY
End: 2024-07-09

## 2024-06-12 RX ORDER — ZOSTER VACCINE RECOMBINANT, ADJUVANTED 50 MCG/0.5
0.5 KIT INTRAMUSCULAR ONCE
Qty: 1 EACH | Refills: 0 | Status: SHIPPED | OUTPATIENT
Start: 2024-06-12 | End: 2024-06-12

## 2024-06-12 NOTE — PROGRESS NOTES
Patient ID: [id number]     Chief Complaint: Medicare AWV    HPI:     Gustavo Lanza is a 78 y.o. male here today for a Medicare Wellness.     Still with leg weakness. Pt was on table to undergo aortogram with runoff but could not hold still. He left hospital next morning AMA. Looks like he has vascular f/u next month.     Opioid Screening: Patient medication list reviewed, patient is not taking prescription opioids. Patient is not using additional opioids than prescribed. Patient is at low risk of substance abuse based on this opioid use history.       -------------------------------------    AAA (abdominal aortic aneurysm)    Atherosclerosis of arteries of extremities    BPH (benign prostatic hyperplasia)    Carotid artery stenosis    Chronic idiopathic constipation    COPD (chronic obstructive pulmonary disease)    Coronary artery disease involving native coronary artery of native heart without angina pectoris    Impaired fasting glucose    Mixed hyperlipidemia    Raynaud disease    S/P AAA repair        Past Surgical History:   Procedure Laterality Date    ABDOMINAL AORTIC ANEURYSM REPAIR  03/13/2020    Surgeon: Dr. Ovalles    AORTOGRAM W/ BLE RUNOFF Bilateral 05/30/2024    Mg Cullen MD    CARDIAC SURGERY  01/30/2014    CABG    COLONOSCOPY  11/12/2021    CARLOS MOHAMUD MD    HERNIA REPAIR Left 03/23/2016    PERIPHERAL ANGIOGRAPHY  12/18/2015    Femoral Popliteal revas w/stent    REPAIR OF HEART VALVE         Review of patient's allergies indicates:   Allergen Reactions    Nitrofurantoin monohyd/m-cryst        Outpatient Medications Marked as Taking for the 6/12/24 encounter (Office Visit) with Gabino Pimentel MD   Medication Sig Dispense Refill    albuterol (PROVENTIL/VENTOLIN HFA) 90 mcg/actuation inhaler Inhale 2 puffs into the lungs every 6 (six) hours as needed for Wheezing or Shortness of Breath. 18 g 5    albuterol-ipratropium (DUO-NEB) 2.5 mg-0.5 mg/3 mL nebulizer solution Take 3 mLs by nebulization  every 6 (six) hours as needed for Wheezing. Rescue 75 mL 2    aspirin (ECOTRIN) 81 MG EC tablet Take 81 mg by mouth once daily.      carvediloL (COREG) 6.25 MG tablet Take 6.25 mg by mouth 2 (two) times daily.      finasteride (PROSCAR) 5 mg tablet Take 1 tablet (5 mg total) by mouth once daily. 90 tablet 3    rosuvastatin (CRESTOR) 5 MG tablet Take 1 tablet (5 mg total) by mouth nightly. 90 tablet 3    tamsulosin (FLOMAX) 0.4 mg Cap TAKE 1 CAPSULE ONE TIME DAILY 90 capsule 3    [DISCONTINUED] alcohol swabs (DROPSAFE ALCOHOL PREP PADS) PadM USE AS DIRECTED 99 each 3    [DISCONTINUED] budesonide-glycopyr-formoterol (BREZTRI AEROSPHERE) 160-9-4.8 mcg/actuation HFAA Inhale 1 puff into the lungs 2 (two) times a day. 10.7 g 11    [DISCONTINUED] doxepin (SINEQUAN) 10 MG capsule Take 1 capsule by mouth every evening.      [DISCONTINUED] traZODone (DESYREL) 150 MG tablet Take 1 tablet by mouth every evening.      [DISCONTINUED] TRUE METRIX AIR GLUCOSE METER Misc       [DISCONTINUED] TRUE METRIX GLUCOSE TEST STRIP Strp 1 strip once daily.      [DISCONTINUED] TRUEPLUS LANCETS 33 gauge Misc 1 lancet  once daily.         Social History     Socioeconomic History    Marital status:    Tobacco Use    Smoking status: Every Day     Current packs/day: 0.25     Types: Cigarettes     Passive exposure: Current    Smokeless tobacco: Never    Tobacco comments:     10 cigarettes a day.   Substance and Sexual Activity    Alcohol use: Never    Drug use: Never    Sexual activity: Yes     Social Determinants of Health     Financial Resource Strain: Low Risk  (6/12/2024)    Overall Financial Resource Strain (CARDIA)     Difficulty of Paying Living Expenses: Not hard at all   Food Insecurity: No Food Insecurity (6/12/2024)    Hunger Vital Sign     Worried About Running Out of Food in the Last Year: Never true     Ran Out of Food in the Last Year: Never true   Transportation Needs: No Transportation Needs (6/12/2024)    TRANSPORTATION  NEEDS     Transportation : No   Physical Activity: Insufficiently Active (6/12/2024)    Exercise Vital Sign     Days of Exercise per Week: 3 days     Minutes of Exercise per Session: 30 min   Stress: Stress Concern Present (6/12/2024)    [employer] - Occupational Stress Questionnaire     Feeling of Stress : Rather much   Housing Stability: Low Risk  (6/12/2024)    Housing Stability Vital Sign     Unable to Pay for Housing in the Last Year: No     Homeless in the Last Year: No        Family History   Problem Relation Name Age of Onset    No Known Problems Mother      No Known Problems Father      No Known Problems Sister      No Known Problems Brother          Patient Care Team:  Gabino Pimentel MD as PCP - General (Internal Medicine)  Jagdish Kelly MD as Consulting Physician (Cardiology)  Gabriela Luna MD as Consulting Physician (Vascular Surgery)  Randall Dewey MD as Consulting Physician (Gastroenterology)       Subjective:     Review of Systems   Constitutional:  Negative for chills and fever.   HENT:  Negative for sinus pressure/congestion and sore throat.    Respiratory:  Negative for cough, shortness of breath and wheezing.    Cardiovascular:  Negative for chest pain and leg swelling.   Gastrointestinal:  Negative for abdominal pain, nausea and vomiting.   Genitourinary:  Negative for dysuria and hematuria.   Musculoskeletal:  Negative for arthralgias and myalgias.   Integumentary:  Negative for rash.   Neurological:  Positive for weakness (legs). Negative for dizziness and syncope.   Hematological:  Negative for adenopathy.   Psychiatric/Behavioral:  Negative for confusion. The patient is not nervous/anxious.        Patient Reported Health Risk Assessment  What [age] 70-79  Are you male or female?: Male  During the past four weeks, how much have you been bothered by emotional problems such as feeling anxious, depressed, irritable, sad, or downhearted and  blue?: Moderately  During the past five weeks, has your physical and/or emotional health limited your social activities with family, friends, neighbors, or groups?: Not at all  During the past four weeks, how much bodily pain have you generally had?: Moderate pain  During the past four weeks, was someone available to help if you needed and wanted help?: Yes, as much as I wanted  During the past four weeks, what was the hardest physical activity you could do for at least two minutes?: Light  Can you get to places out of walking distance without help?  (For example, can you travel alone on buses or taxis, or drive your own car?): Yes  Can you go shopping for groceries or clothes without someone's help?: Yes  Can you prepare your own meals?: Yes  Can you do your own housework without help?: Yes  Because of any health problems, do you need the help of another person with your personal care needs such as eating, bathing, dressing, or getting around the house?: No  Can you handle your own money without help?: Yes  During the past four weeks, how would you rate your health in general?: Fair  How have things been going for you during the past four weeks?: Good and bad parts about equal  Are you having difficulties driving your car?: No  Do you always fasten your seat belt when you are in a car?: Yes, usually  How often in the past four weeks have you been bothered by falling or dizzy when standing up?: Sometimes  How often in the past four weeks have you been bothered by sexual problems?: Never  How often in the past four weeks have you been bothered by trouble eating well?: Never  How often in the past four weeks have you been bothered by teeth or denture problems?: Never  How often in the past four weeks have you been bothered with problems using the telephone?: Never  How often in the past four weeks have you been bothered by tiredness or fatigue?: Sometimes  Have you fallen two or more times in the past year?: Yes  Are  "you afraid of falling?: No  Are you a smoker?: Yes, I'm not ready to quit  During the past four weeks, how many drinks of wine, beer, or other alcoholic beverages did you have?: No alcohol at all  Do you exercise for about 20 minutes three or more days a week?: No, I usually do not exercise this much  Have you been given any information to help you with hazards in your house that might hurt you?: Yes  Have you been given any information to help you with keeping track of your medications?: Yes  How often do you have trouble taking medicines the way you've been told to take them?: I always take them as prescribed  How confident are you that you can control and manage most of your health problems?: Very confident  What is your race? (Check all that apply.):     Objective:     BP (!) 89/56 (BP Location: Right arm, Patient Position: Sitting)   Pulse 71   Temp 97.6 °F (36.4 °C)   Resp 18   Ht 5' 5" (1.651 m)   Wt 56.7 kg (125 lb)   SpO2 98%   BMI 20.80 kg/m²     Physical Exam  Vitals and nursing note reviewed.   Constitutional:       General: He is not in acute distress.  HENT:      Head: Normocephalic.      Nose: No rhinorrhea.   Eyes:      Conjunctiva/sclera: Conjunctivae normal.      Pupils: Pupils are equal, round, and reactive to light.   Cardiovascular:      Rate and Rhythm: Normal rate and regular rhythm.      Heart sounds: Murmur heard.   Pulmonary:      Effort: Pulmonary effort is normal.      Comments: Decreased BS throughout  Abdominal:      Palpations: Abdomen is soft.      Tenderness: There is no abdominal tenderness.   Musculoskeletal:         General: No deformity or signs of injury.   Skin:     General: Skin is warm and dry.   Neurological:      General: No focal deficit present.      Mental Status: He is alert. Mental status is at baseline.   Psychiatric:         Mood and Affect: Mood normal.         Behavior: Behavior normal.                No data to display                  7/9/2024     " 8:40 AM 6/20/2024    11:40 AM 6/12/2024     1:00 PM 3/26/2024     2:20 PM 2/26/2024    10:15 AM 12/11/2023     1:40 PM 10/30/2023     3:00 PM   Fall Risk Assessment - Outpatient   Mobility Status Ambulatory Ambulatory Ambulatory Ambulatory Ambulatory Ambulatory Ambulatory   Number of falls 1 2+ 0 2+ 1 0 1   Identified as fall risk False True False True False False False           Depression Screening  Over the past two weeks, has the patient felt down, depressed, or hopeless?: Yes  Over the past two weeks, has the patient felt little interest or pleasure in doing things?: Yes  Assessment/Plan:     1. Medicare annual wellness visit, subsequent  Assessment & Plan:  Reviewed recent wellness labs. See below for health maintenance.    Vaccinations:   - Flu: due this fall, typically does receive   - Pneumonia: PCV13 2016, PPSV23 2018   - Shingles (>50): sent Rx to pharmacy today   - Tdap: declines   - COVID: received x 4  Screening:   - Prostate (>45): PSA normal, follows Urology   - Lung Ca. Screening (50-80 with >20 pack yrs current or quit <15 yrs): due in December, pt wishes to continue screening   - Colon Ca. Screening (>45):    - AAA (65-75 if ever smoked):    - Cardiac:    - Hep. C, HIV:       2. Spinal stenosis, lumbosacral region  -     MRI Lumbar Spine Without Contrast; Future; Expected date: 06/12/2024    3. Acute cystitis without hematuria  -     amoxicillin-clavulanate 875-125mg (AUGMENTIN) 875-125 mg per tablet; Take 1 tablet by mouth 2 (two) times daily. for 10 days  Dispense: 20 tablet; Refill: 0    4. Need for shingles vaccine  -     varicella-zoster [id number], PF, (SHINGRIX, PF,) 50 mcg/0.5 mL injection; Inject 0.5 mLs into the muscle once. for 1 dose  Dispense: 1 each; Refill: 0         Medicare Annual Wellness and Personalized Prevention Plan:   Fall Risk + Home Safety + Hearing Impairment + Depression Screen + Opioid and Substance Abuse Screening + Cognitive Impairment Screen + Health Risk Assessment  all reviewed.     Health Maintenance Topics with due status: Not Due       Topic Last Completion Date    Influenza Vaccine 10/30/2023    Lipid Panel 06/10/2024    Aspirin/Antiplatelet Therapy 07/09/2024      The patient's Health Maintenance was reviewed and the following appears to be due at this time:   Health Maintenance Due   Topic Date Due    TETANUS VACCINE  Never done    Shingles Vaccine (1 of 2) Never done    RSV Vaccine (Age 60+ and Pregnant patients) (1 - 1-dose 60+ series) Never done    COVID-19 Vaccine (5 - 2023-24 season) 09/01/2023    Hemoglobin A1c (Prediabetes)  05/31/2024       Advance Care Planning   I attest to discussing Advance Care Planning with patient and/or family member.  Education was provided including the importance of the Health Care Power of , Advance Directives, and/or [place] documentation.  The patient expressed understanding to the importance of this information and discussion.     Advance Care Planning     Date: 06/12/2024  Patient did not wish or was not able to name a surrogate decision maker or provide an Advance Care Plan.         Follow up in about 2 weeks (around 6/26/2024) for MRI review, Leg weakness. In addition to their scheduled follow up, the patient has also been instructed to follow up on as needed basis.

## 2024-06-12 NOTE — ASSESSMENT & PLAN NOTE
Reviewed recent wellness labs. See below for health maintenance.    Vaccinations:   - Flu: due this fall, typically does receive   - Pneumonia: PCV13 2016, PPSV23 2018   - Shingles (>50): sent Rx to pharmacy today   - Tdap: declines   - COVID: received x 4  Screening:   - Prostate (>45): PSA normal, follows Urology   - Lung Ca. Screening (50-80 with >20 pack yrs current or quit <15 yrs): due in December, pt wishes to continue screening   - Colon Ca. Screening (>45):    - AAA (65-75 if ever smoked):    - Cardiac: follows cardiology    - Hep. C: done 2021

## 2024-06-20 ENCOUNTER — TELEPHONE (OUTPATIENT)
Dept: PRIMARY CARE CLINIC | Facility: CLINIC | Age: 79
End: 2024-06-20

## 2024-06-20 ENCOUNTER — OFFICE VISIT (OUTPATIENT)
Dept: PRIMARY CARE CLINIC | Facility: CLINIC | Age: 79
End: 2024-06-20
Payer: MEDICARE

## 2024-06-20 VITALS
HEIGHT: 65 IN | SYSTOLIC BLOOD PRESSURE: 106 MMHG | RESPIRATION RATE: 18 BRPM | TEMPERATURE: 97 F | WEIGHT: 124 LBS | DIASTOLIC BLOOD PRESSURE: 66 MMHG | BODY MASS INDEX: 20.66 KG/M2 | OXYGEN SATURATION: 98 % | HEART RATE: 68 BPM

## 2024-06-20 DIAGNOSIS — M48.061 NEURAL FORAMINAL STENOSIS OF LUMBAR SPINE: ICD-10-CM

## 2024-06-20 DIAGNOSIS — I71.23 ANEURYSM OF DESCENDING THORACIC AORTA WITHOUT RUPTURE: ICD-10-CM

## 2024-06-20 DIAGNOSIS — M48.062 SPINAL STENOSIS OF LUMBAR REGION WITH NEUROGENIC CLAUDICATION: Primary | ICD-10-CM

## 2024-06-20 DIAGNOSIS — F17.200 SMOKER: ICD-10-CM

## 2024-06-20 DIAGNOSIS — S32.010A COMPRESSION FRACTURE OF L1 VERTEBRA, INITIAL ENCOUNTER: ICD-10-CM

## 2024-06-20 DIAGNOSIS — F51.01 PRIMARY INSOMNIA: Primary | ICD-10-CM

## 2024-06-20 DIAGNOSIS — Z13.820 SCREENING FOR OSTEOPOROSIS: ICD-10-CM

## 2024-06-20 PROCEDURE — 99214 OFFICE O/P EST MOD 30 MIN: CPT | Mod: ,,, | Performed by: STUDENT IN AN ORGANIZED HEALTH CARE EDUCATION/TRAINING PROGRAM

## 2024-06-20 PROCEDURE — 3288F FALL RISK ASSESSMENT DOCD: CPT | Mod: CPTII,,, | Performed by: STUDENT IN AN ORGANIZED HEALTH CARE EDUCATION/TRAINING PROGRAM

## 2024-06-20 PROCEDURE — 1160F RVW MEDS BY RX/DR IN RCRD: CPT | Mod: CPTII,,, | Performed by: STUDENT IN AN ORGANIZED HEALTH CARE EDUCATION/TRAINING PROGRAM

## 2024-06-20 PROCEDURE — 3074F SYST BP LT 130 MM HG: CPT | Mod: CPTII,,, | Performed by: STUDENT IN AN ORGANIZED HEALTH CARE EDUCATION/TRAINING PROGRAM

## 2024-06-20 PROCEDURE — 1159F MED LIST DOCD IN RCRD: CPT | Mod: CPTII,,, | Performed by: STUDENT IN AN ORGANIZED HEALTH CARE EDUCATION/TRAINING PROGRAM

## 2024-06-20 PROCEDURE — 1100F PTFALLS ASSESS-DOCD GE2>/YR: CPT | Mod: CPTII,,, | Performed by: STUDENT IN AN ORGANIZED HEALTH CARE EDUCATION/TRAINING PROGRAM

## 2024-06-20 PROCEDURE — 3078F DIAST BP <80 MM HG: CPT | Mod: CPTII,,, | Performed by: STUDENT IN AN ORGANIZED HEALTH CARE EDUCATION/TRAINING PROGRAM

## 2024-06-20 RX ORDER — TRAZODONE HYDROCHLORIDE 150 MG/1
150 TABLET ORAL NIGHTLY
Qty: 90 TABLET | Refills: 3 | Status: SHIPPED | OUTPATIENT
Start: 2024-06-20

## 2024-06-20 NOTE — LETTER
AUTHORIZATION FOR RELEASE OF   CONFIDENTIAL INFORMATION    Dear Medical Records,    We are seeing Gustavo Lanza, date of birth 1945, in the clinic at [facility]. Gabino Pimentel MD is the patient's PCP. Gustavo Lanza has an outstanding lab/procedure at the time we reviewed his chart. In order to help keep his health information updated, he has authorized us to request the following medical record(s):        (  )  MAMMOGRAM                                      (  )  COLONOSCOPY      (  )  PAP SMEAR                                          (  )  OUTSIDE LAB RESULTS     (  )  DEXA SCAN                                          (  )  EYE EXAM            (  )  FOOT EXAM                                          (  )  ENTIRE RECORD     (  )  OUTSIDE IMMUNIZATIONS                 (X)  LAST OFFICE NOTE         Please fax records to [facility], [place], Jeffrey D, MD, [phone]      Patient Name: Gustavo Lanza  : 1945  Patient Phone #: [phone]

## 2024-06-20 NOTE — PROGRESS NOTES
Subjective:       Patient ID: Gustavo Lanza is a 78 y.o. male.    -------------------------------------    Atherosclerosis of arteries of extremities    BPH (benign prostatic hyperplasia)    Carotid artery stenosis    Chronic idiopathic constipation    COPD (chronic obstructive pulmonary disease)    Coronary artery disease involving native coronary artery of native heart without angina pectoris    Impaired fasting glucose    Mixed hyperlipidemia    Raynaud disease    S/P AAA repair        Chief Complaint: Follow-up (MRI review)    F/u for leg weakness and MRI review. Symptoms unchanged from previous (see previous office notes). Still with significant leg weakness, fatigability, legs giving out results in falls.    Pt also requesting new referral for Vascular. On 5/30/24 aortogram had to be aborted sec to pt movement. Today, pt reports his vascular surgeon told him there was nothing he could do to help him.       Review of Systems   Constitutional:  Negative for chills and fever.   HENT:  Negative for sinus pressure/congestion and sore throat.    Respiratory:  Negative for cough, shortness of breath and wheezing.    Cardiovascular:  Negative for chest pain and leg swelling.   Gastrointestinal:  Negative for abdominal pain, nausea and vomiting.   Genitourinary:  Negative for dysuria and hematuria.   Musculoskeletal:  Negative for arthralgias and myalgias.   Integumentary:  Negative for rash.   Neurological:  Positive for weakness (legs). Negative for dizziness and syncope.   Hematological:  Negative for adenopathy.   Psychiatric/Behavioral:  Negative for confusion. The patient is not nervous/anxious.            Objective:      Physical Exam  Vitals and nursing note reviewed.   Constitutional:       General: He is not in acute distress.  HENT:      Head: Normocephalic.      Nose: No rhinorrhea.   Eyes:      Conjunctiva/sclera: Conjunctivae normal.      Pupils: Pupils are equal, round, and reactive to light.    Cardiovascular:      Rate and Rhythm: Normal rate and regular rhythm.      Heart sounds: Murmur (systolic) heard.   Pulmonary:      Effort: Pulmonary effort is normal.      Comments: BS decreased throughout  Abdominal:      Palpations: Abdomen is soft.      Tenderness: There is no abdominal tenderness.   Musculoskeletal:         General: No deformity or signs of injury.   Skin:     General: Skin is warm and dry.   Neurological:      General: No focal deficit present.      Mental Status: He is alert. Mental status is at baseline.   Psychiatric:         Mood and Affect: Mood normal.         Behavior: Behavior normal.           Assessment & Plan:   1. Spinal stenosis of lumbar region with neurogenic claudication  Overview:  See MRI 6/12/24 - severe lumbar spinal stenosis and neural foramen stenosis    Assessment & Plan:  It's been challenging to determine how much of his symptoms are vascular in nature vs neurologic.   Now, with MRI showing spinal stenosis as well as multiple areas of severe neural foramen stenosis, must consider that neurogenic claudication is contributing.   His symptoms are severely limiting his quality of life. Patient is very passionate about riding his motorcycle, but he's now unable sec to risk of crashing from leg weakness/unresponsiveness.   Referring to Neurosurgery for evaluation.    Orders:  -     Ambulatory referral/consult to Neurosurgery    2. Neural foraminal stenosis of lumbar spine  -     Ambulatory referral/consult to Neurosurgery    3. Screening for osteoporosis  -     DXA Bone Density Axial Skeleton 1 or more sites; Future; Expected date: 06/20/2024    4. Smoker  -     DXA Bone Density Axial Skeleton 1 or more sites; Future; Expected date: 06/20/2024    5. Compression fracture of L1 vertebra, initial encounter  -     DXA Bone Density Axial Skeleton 1 or more sites; Future; Expected date: 06/20/2024    6. Aneurysm of descending thoracic aorta without rupture  Assessment &  Plan:  Complex history. Aortic stent is in place. Aortogram aborted on 5/30/24 sec to pt movement (Dr. Mg Cullen). Pt went for follow up in office (unsure which provider). Pt reports he was told nothing could be done for him. He is requesting a referral for 2nd opinion.     We'll request office notes from Dr. Mg Cullen (most recent Vascular he's seen) and from Dr. Luna (seen about a year ago). Once this info obtained, will see if we can refer to another vascular per his request.    Orders:  -     Ambulatory referral/consult to Vascular Surgery        Follow up in about 6 months (around 12/20/2024). In addition to their scheduled follow up, the patient has also been instructed to follow up on as needed basis.

## 2024-06-20 NOTE — LETTER
AUTHORIZATION FOR RELEASE OF   CONFIDENTIAL INFORMATION    Dear Medical Records,    We are seeing Gustavo Lanza, date of birth 1945, in the clinic at [facility]. Gabino Pimentel MD is the patient's PCP. Gustavo Lanza has an outstanding lab/procedure at the time we reviewed his chart. In order to help keep his health information updated, he has authorized us to request the following medical record(s):        (  )  MAMMOGRAM                                      (  )  COLONOSCOPY      (  )  PAP SMEAR                                          (  )  OUTSIDE LAB RESULTS     (  )  DEXA SCAN                                          (  )  EYE EXAM            (  )  FOOT EXAM                                          (  )  ENTIRE RECORD     (  )  OUTSIDE IMMUNIZATIONS                 (X)  LAST OFFICE NOTE         Please fax records to [facility], [place], Jeffrey D, MD, [phone]    Patient Name: Gustavo Lanza  : 1945  Patient Phone #: [phone]

## 2024-06-20 NOTE — ASSESSMENT & PLAN NOTE
It's been challenging to determine how much of his symptoms are vascular in nature vs neurologic.   Now, with MRI showing spinal stenosis as well as multiple areas of severe neural foramen stenosis, must consider that neurogenic claudication is contributing.   His symptoms are severely limiting his quality of life. Patient is very passionate about riding his motorcycle, but he's now unable sec to risk of crashing from leg weakness/unresponsiveness.   Referring to Neurosurgery for evaluation.

## 2024-06-20 NOTE — ASSESSMENT & PLAN NOTE
Complex history. Aortic stent is in place. Aortogram aborted on 5/30/24 sec to pt movement (Dr. Mg Cullen). Pt went for follow up in office (unsure which provider). Pt reports he was told nothing could be done for him. He is requesting a referral for 2nd opinion.     We'll request office notes from Dr. Mg Cullen (most recent Vascular he's seen) and from Dr. Luna (seen about a year ago). Once this info obtained, will see if we can refer to another vascular per his request.

## 2024-06-26 ENCOUNTER — TELEPHONE (OUTPATIENT)
Dept: PRIMARY CARE CLINIC | Facility: CLINIC | Age: 79
End: 2024-06-26
Payer: MEDICARE

## 2024-06-27 ENCOUNTER — TELEPHONE (OUTPATIENT)
Dept: PRIMARY CARE CLINIC | Facility: CLINIC | Age: 79
End: 2024-06-27
Payer: MEDICARE

## 2024-06-27 NOTE — TELEPHONE ENCOUNTER
Spoke with  with [facility]. Stated that Dr. Flores no longer works for their office. Stated that pt has a follow up appt with Dr. Cullen in January. Advised that pt would like a second opinion. Stated she would reach out to pt to schedule with a different provider in office for second opinion.

## 2024-06-28 RX ORDER — BISACODYL 5 MG
5 TABLET, DELAYED RELEASE (ENTERIC COATED) ORAL DAILY PRN
COMMUNITY

## 2024-07-03 NOTE — PROGRESS NOTES
"    [facility] - Clinic Note  Gabriela Luna MD      Patient Name: Gustavo Lanza                   : 1945      MRN: [id number]   Visit Date: 2024       History Present Illness     Reason for Visit: Abdominal Aortic Aneurysm    Mr. Lanza presents to the clinic for 1 year surveillance of his abdominal aortic aneurysm s/p repair in  and descending thoracic aortic aneurysm. CT chest was obtained by his PCP in December. He denies acute abdominal, chest, or back pain. Does have some occasional chronic discomfort. He was additionally evaluated by Dr. Cullen for lower extremity arterial disease and underwent angiogram on 24. Procedure was not completed due to reaction to his sedation. He wishes to maintain follow up here and have a second opinion regarding his lower extremity issues. He reports he is able to walk for a good ways before onset of numbness in his legs. He struggles discomfort and weakness in his legs when driving his motorcycle.         REVIEW OF SYSTEMS:  12 point review of systems conducted, negative except as stated in the history of present illness. See HPI for details.        Physical Exam      Vitals:    24 0841 24 0846   BP: 100/63 118/84   BP Location: Right arm Left forearm   Pulse: 74 77   Weight: 57.2 kg (126 lb)    Height: 5' 5" (1.651 m)           General: well-nourished, no acute distress, and thin, ambulating normally, alert, pleasant, conversant, and oriented  Neurologic: cranial nerves are grossly intact, no neurologic deficits, no motor deficits, and no sensory deficits  HEENT: grossly normal and no scleral icterus  Neck/Chest: normal  and soft without lymphadenopathy  Respiratory: breathing easily and without respiratory distress  Abdomen: normal and soft  Cardiology: regular rate and rhythm    Upper Extremity Arterial Exam:   Right - radial is palpable  Left - radial is palpable    Lower Extremity Arterial Exam:  Right - posterior tibial is " palpable and dorsalis pedis is non-palpable  Left - posterior tibial is non-palpable and dorsalis pedis is non-palpable    Musculoskeletal:   Lower Extremity: no edema present to bilateral lower extremities     Skin: has no obvious skin abnormality to lower extremities              Assessment and Plan     Mr. Lanza is a 78 y.o. with an abdominal and thoracic descending aortic aneurysm.  He  is s/p Endovascular Aneurysm Repair in 2020. He denies acute back or abdominal pain. His previous aorta duplex obtained 7/6/23 demonstrates a  4.5 cm AAA. CTA obtained in May reveals stable aneurysm and patent stent measuring approximately 4.6 cm without evidence of endoleak. CT chest (lung screening) (I did review these images) in December demonstrates a stable appearing TAA measuring approximately 4.2 cm. Recommend 1 year follow up with abdominal duplex. Will follow TAA on his annual CT chest.     In regard to his lower extremity arterial disease, there is no clear or significant blockage that would be causing his leg numbness. I am suspicious his low back issues may be contributing to his symptoms. Agree with his PCP recommendation of evaluation by a back doctor.       1. Infrarenal abdominal aortic aneurysm (AAA) without rupture    2. Aneurysm of descending thoracic aorta without rupture    3. S/P AAA repair          Imaging Obtained/Reviewed   Study:  CT chest  Date:   12/12/23    Study: CTA aorta/runoff  Date: 5/10/24        Medical History     Past Medical History:   Diagnosis Date    AAA (abdominal aortic aneurysm)     Atherosclerosis of arteries of extremities     BPH (benign prostatic hyperplasia) 08/03/2022    Carotid artery stenosis     Chronic idiopathic constipation 08/03/2022    COPD (chronic obstructive pulmonary disease)     Coronary artery disease involving native coronary artery of native heart without angina pectoris 08/03/2022    Impaired fasting glucose 08/03/2022    Mixed hyperlipidemia 08/03/2022     Raynaud disease 08/03/2022    S/P AAA repair 08/03/2022     Past Surgical History:   Procedure Laterality Date    ABDOMINAL AORTIC ANEURYSM REPAIR  03/13/2020    Surgeon: Dr. Ovalles    AORTOGRAM W/ BLE RUNOFF Bilateral 05/30/2024    Mg Cullen MD    CARDIAC SURGERY  01/30/2014    CABG    COLONOSCOPY  11/12/2021    CARLOS MOHAMUD MD    HERNIA REPAIR Left 03/23/2016    PERIPHERAL ANGIOGRAPHY  12/18/2015    Femoral Popliteal revas w/stent    REPAIR OF HEART VALVE       Family History   Problem Relation Name Age of Onset    No Known Problems Mother      No Known Problems Father      No Known Problems Sister      No Known Problems Brother       Social History     Socioeconomic History    Marital status:    Tobacco Use    Smoking status: Every Day     Current packs/day: 0.25     Types: Cigarettes     Passive exposure: Current    Smokeless tobacco: Never    Tobacco comments:     10 cigarettes a day.   Substance and Sexual Activity    Alcohol use: Never    Drug use: Never    Sexual activity: Yes     Social Determinants of Health     Financial Resource Strain: Low Risk  (6/12/2024)    Overall Financial Resource Strain (CARDIA)     Difficulty of Paying Living Expenses: Not hard at all   Food Insecurity: No Food Insecurity (6/12/2024)    Hunger Vital Sign     Worried About Running Out of Food in the Last Year: Never true     Ran Out of Food in the Last Year: Never true   Transportation Needs: No Transportation Needs (6/12/2024)    TRANSPORTATION NEEDS     Transportation : No   Physical Activity: Insufficiently Active (6/12/2024)    Exercise Vital Sign     Days of Exercise per Week: 3 days     Minutes of Exercise per Session: 30 min   Stress: Stress Concern Present (6/12/2024)    [employer] - Occupational Stress Questionnaire     Feeling of Stress : Rather much   Housing Stability: Low Risk  (6/12/2024)    Housing Stability Vital Sign     Unable to Pay for Housing in the Last Year: No      Homeless in the Last Year: No     Current Outpatient Medications   Medication Instructions    albuterol (PROVENTIL/VENTOLIN HFA) 90 mcg/actuation inhaler 2 puffs, Inhalation, Every 6 hours PRN    albuterol-ipratropium (DUO-NEB) 2.5 mg-0.5 mg/3 mL nebulizer solution 3 mLs, Nebulization, Every 6 hours PRN, Rescue    aspirin (ECOTRIN) 81 mg, Oral, Daily    bisacodyL (DULCOLAX) 5 mg, Oral, Daily PRN    carvediloL (COREG) 6.25 mg, Oral, 2 times daily    finasteride (PROSCAR) 5 mg, Oral, Daily    rosuvastatin (CRESTOR) 5 mg, Oral, Nightly    tamsulosin (FLOMAX) 0.4 mg, Oral     Review of patient's allergies indicates:   Allergen Reactions    Nitrofurantoin monohyd/m-cryst        Patient Care Team:  Gabino Pimentel MD as PCP - General (Internal Medicine)  Jagdish Kelly MD as Consulting Physician (Cardiology)  Gabriela Luna MD as Consulting Physician (Vascular Surgery)  Randall Dewey MD as Consulting Physician (Gastroenterology)        No follow-ups on file. In addition to their scheduled follow up, the patient has also been instructed to follow up on as needed basis.     Future Appointments   Date Time Provider Department Center   8/26/2024 10:30 AM Kurt Howard FNP [facility]   12/23/2024  9:20 AM Gabino Pimentel MD [facility]   1/27/2025  1:00 PM Gabino Pimentel MD [facility]

## 2024-07-08 DIAGNOSIS — I73.9 PAD (PERIPHERAL ARTERY DISEASE): Primary | ICD-10-CM

## 2024-07-09 ENCOUNTER — OFFICE VISIT (OUTPATIENT)
Dept: VASCULAR SURGERY | Facility: CLINIC | Age: 79
End: 2024-07-09
Attending: SPECIALIST
Payer: MEDICARE

## 2024-07-09 VITALS
SYSTOLIC BLOOD PRESSURE: 118 MMHG | DIASTOLIC BLOOD PRESSURE: 84 MMHG | WEIGHT: 126 LBS | HEART RATE: 77 BPM | HEIGHT: 65 IN | BODY MASS INDEX: 20.99 KG/M2

## 2024-07-09 DIAGNOSIS — I71.43 INFRARENAL ABDOMINAL AORTIC ANEURYSM (AAA) WITHOUT RUPTURE: Primary | ICD-10-CM

## 2024-07-09 DIAGNOSIS — I71.23 ANEURYSM OF DESCENDING THORACIC AORTA WITHOUT RUPTURE: ICD-10-CM

## 2024-07-09 DIAGNOSIS — Z98.890 S/P AAA REPAIR: ICD-10-CM

## 2024-07-09 DIAGNOSIS — Z86.79 S/P AAA REPAIR: ICD-10-CM

## 2024-07-09 PROCEDURE — 1159F MED LIST DOCD IN RCRD: CPT | Mod: CPTII,,, | Performed by: SPECIALIST

## 2024-07-09 PROCEDURE — 3288F FALL RISK ASSESSMENT DOCD: CPT | Mod: CPTII,,, | Performed by: SPECIALIST

## 2024-07-09 PROCEDURE — 1101F PT FALLS ASSESS-DOCD LE1/YR: CPT | Mod: CPTII,,, | Performed by: SPECIALIST

## 2024-07-09 PROCEDURE — 3079F DIAST BP 80-89 MM HG: CPT | Mod: CPTII,,, | Performed by: SPECIALIST

## 2024-07-09 PROCEDURE — 1160F RVW MEDS BY RX/DR IN RCRD: CPT | Mod: CPTII,,, | Performed by: SPECIALIST

## 2024-07-09 PROCEDURE — 3074F SYST BP LT 130 MM HG: CPT | Mod: CPTII,,, | Performed by: SPECIALIST

## 2024-07-09 PROCEDURE — 99213 OFFICE O/P EST LOW 20 MIN: CPT | Mod: ,,, | Performed by: SPECIALIST

## 2024-07-15 ENCOUNTER — TELEPHONE (OUTPATIENT)
Dept: PRIMARY CARE CLINIC | Facility: CLINIC | Age: 79
End: 2024-07-15
Payer: MEDICARE

## 2024-07-17 ENCOUNTER — TELEPHONE (OUTPATIENT)
Dept: PRIMARY CARE CLINIC | Facility: CLINIC | Age: 79
End: 2024-07-17
Payer: MEDICARE

## 2024-07-17 NOTE — TELEPHONE ENCOUNTER
Pt called to ask for a letter stating that he is in good health and able to work part time. 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 Pt reports he has not had a fall in 3 weeks and started taking a medication that he ordered online called Sciaturna. Pt states that this medication has helped with the falls and weakness he was experiencing. Please advise.

## 2024-07-18 ENCOUNTER — TELEPHONE (OUTPATIENT)
Dept: PRIMARY CARE CLINIC | Facility: CLINIC | Age: 79
End: 2024-07-18
Payer: MEDICARE

## 2024-07-25 ENCOUNTER — OFFICE VISIT (OUTPATIENT)
Dept: PRIMARY CARE CLINIC | Facility: CLINIC | Age: 79
End: 2024-07-25
Payer: MEDICARE

## 2024-07-25 VITALS
WEIGHT: 125.63 LBS | HEART RATE: 80 BPM | BODY MASS INDEX: 20.93 KG/M2 | OXYGEN SATURATION: 95 % | RESPIRATION RATE: 18 BRPM | HEIGHT: 65 IN | TEMPERATURE: 98 F | DIASTOLIC BLOOD PRESSURE: 57 MMHG | SYSTOLIC BLOOD PRESSURE: 94 MMHG

## 2024-07-25 DIAGNOSIS — Z76.89 RETURN TO WORK EVALUATION: Primary | ICD-10-CM

## 2024-07-25 DIAGNOSIS — R29.898 LEG WEAKNESS, BILATERAL: ICD-10-CM

## 2024-07-25 PROCEDURE — 99213 OFFICE O/P EST LOW 20 MIN: CPT | Mod: ,,, | Performed by: STUDENT IN AN ORGANIZED HEALTH CARE EDUCATION/TRAINING PROGRAM

## 2024-07-25 PROCEDURE — 1159F MED LIST DOCD IN RCRD: CPT | Mod: CPTII,,, | Performed by: STUDENT IN AN ORGANIZED HEALTH CARE EDUCATION/TRAINING PROGRAM

## 2024-07-25 PROCEDURE — 1101F PT FALLS ASSESS-DOCD LE1/YR: CPT | Mod: CPTII,,, | Performed by: STUDENT IN AN ORGANIZED HEALTH CARE EDUCATION/TRAINING PROGRAM

## 2024-07-25 PROCEDURE — 3288F FALL RISK ASSESSMENT DOCD: CPT | Mod: CPTII,,, | Performed by: STUDENT IN AN ORGANIZED HEALTH CARE EDUCATION/TRAINING PROGRAM

## 2024-07-25 PROCEDURE — 3078F DIAST BP <80 MM HG: CPT | Mod: CPTII,,, | Performed by: STUDENT IN AN ORGANIZED HEALTH CARE EDUCATION/TRAINING PROGRAM

## 2024-07-25 PROCEDURE — 1160F RVW MEDS BY RX/DR IN RCRD: CPT | Mod: CPTII,,, | Performed by: STUDENT IN AN ORGANIZED HEALTH CARE EDUCATION/TRAINING PROGRAM

## 2024-07-25 PROCEDURE — 3074F SYST BP LT 130 MM HG: CPT | Mod: CPTII,,, | Performed by: STUDENT IN AN ORGANIZED HEALTH CARE EDUCATION/TRAINING PROGRAM

## 2024-08-09 PROBLEM — Z76.89 RETURN TO WORK EVALUATION: Status: ACTIVE | Noted: 2024-08-09

## 2024-08-20 ENCOUNTER — LAB VISIT (OUTPATIENT)
Dept: LAB | Facility: HOSPITAL | Age: 79
End: 2024-08-20
Attending: NURSE PRACTITIONER
Payer: MEDICARE

## 2024-08-20 DIAGNOSIS — N18.31 STAGE 3A CHRONIC KIDNEY DISEASE: ICD-10-CM

## 2024-08-20 LAB
ALBUMIN SERPL-MCNC: 3.5 G/DL (ref 3.4–4.8)
ALBUMIN/GLOB SERPL: 1.2 RATIO (ref 1.1–2)
ALP SERPL-CCNC: 58 UNIT/L (ref 40–150)
ALT SERPL-CCNC: 13 UNIT/L (ref 0–55)
ANION GAP SERPL CALC-SCNC: 6 MEQ/L
AST SERPL-CCNC: 17 UNIT/L (ref 5–34)
BACTERIA #/AREA URNS AUTO: ABNORMAL /HPF
BASOPHILS # BLD AUTO: 0.06 X10(3)/MCL
BASOPHILS NFR BLD AUTO: 0.7 %
BILIRUB SERPL-MCNC: 0.5 MG/DL
BILIRUB UR QL STRIP.AUTO: NEGATIVE
BUN SERPL-MCNC: 20.8 MG/DL (ref 8.4–25.7)
CALCIUM SERPL-MCNC: 9.1 MG/DL (ref 8.8–10)
CHLORIDE SERPL-SCNC: 103 MMOL/L (ref 98–107)
CLARITY UR: ABNORMAL
CO2 SERPL-SCNC: 29 MMOL/L (ref 23–31)
COLOR UR AUTO: YELLOW
CREAT SERPL-MCNC: 1.21 MG/DL (ref 0.73–1.18)
CREAT UR-MCNC: 190.8 MG/DL (ref 63–166)
CREAT/UREA NIT SERPL: 17
EOSINOPHIL # BLD AUTO: 0.34 X10(3)/MCL (ref 0–0.9)
EOSINOPHIL NFR BLD AUTO: 4 %
ERYTHROCYTE [DISTWIDTH] IN BLOOD BY AUTOMATED COUNT: 14 % (ref 11.5–17)
GFR SERPLBLD CREATININE-BSD FMLA CKD-EPI: >60 ML/MIN/1.73/M2
GLOBULIN SER-MCNC: 2.9 GM/DL (ref 2.4–3.5)
GLUCOSE SERPL-MCNC: 139 MG/DL (ref 82–115)
GLUCOSE UR QL STRIP: NORMAL
HCT VFR BLD AUTO: 39.4 % (ref 42–52)
HGB BLD-MCNC: 12.8 G/DL (ref 14–18)
HGB UR QL STRIP: ABNORMAL
IMM GRANULOCYTES # BLD AUTO: 0.03 X10(3)/MCL (ref 0–0.04)
IMM GRANULOCYTES NFR BLD AUTO: 0.3 %
KETONES UR QL STRIP: NEGATIVE
LEUKOCYTE ESTERASE UR QL STRIP: 500
LYMPHOCYTES # BLD AUTO: 1.34 X10(3)/MCL (ref 0.6–4.6)
LYMPHOCYTES NFR BLD AUTO: 15.6 %
MCH RBC QN AUTO: 30.4 PG (ref 27–31)
MCHC RBC AUTO-ENTMCNC: 32.5 G/DL (ref 33–36)
MCV RBC AUTO: 93.6 FL (ref 80–94)
MONOCYTES # BLD AUTO: 0.89 X10(3)/MCL (ref 0.1–1.3)
MONOCYTES NFR BLD AUTO: 10.4 %
NEUTROPHILS # BLD AUTO: 5.93 X10(3)/MCL (ref 2.1–9.2)
NEUTROPHILS NFR BLD AUTO: 69 %
NITRITE UR QL STRIP: ABNORMAL
NRBC BLD AUTO-RTO: 0 %
PH UR STRIP: 5.5 [PH]
PLATELET # BLD AUTO: 210 X10(3)/MCL (ref 130–400)
PMV BLD AUTO: 10.2 FL (ref 7.4–10.4)
POTASSIUM SERPL-SCNC: 4.1 MMOL/L (ref 3.5–5.1)
PROT SERPL-MCNC: 6.4 GM/DL (ref 5.8–7.6)
PROT UR QL STRIP: ABNORMAL
PROT UR STRIP-MCNC: 22.5 MG/DL
PTH-INTACT SERPL-MCNC: 48.6 PG/ML (ref 8.7–77)
RBC # BLD AUTO: 4.21 X10(6)/MCL (ref 4.7–6.1)
RBC #/AREA URNS AUTO: ABNORMAL /HPF
SODIUM SERPL-SCNC: 138 MMOL/L (ref 136–145)
SP GR UR STRIP.AUTO: 1.02 (ref 1–1.03)
SQUAMOUS #/AREA URNS LPF: ABNORMAL /HPF
URINE PROTEIN/CREATININE RATIO (OLG): 0.1
UROBILINOGEN UR STRIP-ACNC: 2
WBC # BLD AUTO: 8.59 X10(3)/MCL (ref 4.5–11.5)
WBC #/AREA URNS AUTO: >100 /HPF
WBC CLUMPS UR QL AUTO: ABNORMAL

## 2024-08-20 PROCEDURE — 85025 COMPLETE CBC W/AUTO DIFF WBC: CPT

## 2024-08-20 PROCEDURE — 87186 SC STD MICRODIL/AGAR DIL: CPT | Mod: 91

## 2024-08-20 PROCEDURE — 82570 ASSAY OF URINE CREATININE: CPT

## 2024-08-20 PROCEDURE — 80053 COMPREHEN METABOLIC PANEL: CPT

## 2024-08-20 PROCEDURE — 81001 URINALYSIS AUTO W/SCOPE: CPT

## 2024-08-20 PROCEDURE — 83970 ASSAY OF PARATHORMONE: CPT

## 2024-08-20 PROCEDURE — 87086 URINE CULTURE/COLONY COUNT: CPT

## 2024-08-20 PROCEDURE — 84156 ASSAY OF PROTEIN URINE: CPT

## 2024-08-20 PROCEDURE — 36415 COLL VENOUS BLD VENIPUNCTURE: CPT

## 2024-08-20 PROCEDURE — 87077 CULTURE AEROBIC IDENTIFY: CPT

## 2024-08-23 ENCOUNTER — TELEPHONE (OUTPATIENT)
Dept: NEPHROLOGY | Facility: CLINIC | Age: 79
End: 2024-08-23
Payer: MEDICARE

## 2024-08-23 DIAGNOSIS — N39.0 UTI (URINARY TRACT INFECTION), UNCOMPLICATED: Primary | ICD-10-CM

## 2024-08-23 LAB
BACTERIA UR CULT: ABNORMAL
BACTERIA UR CULT: ABNORMAL

## 2024-08-23 RX ORDER — CEFDINIR 300 MG/1
300 CAPSULE ORAL 2 TIMES DAILY
Qty: 14 CAPSULE | Refills: 0 | Status: SHIPPED | OUTPATIENT
Start: 2024-08-23 | End: 2024-08-30

## 2024-08-23 NOTE — TELEPHONE ENCOUNTER
----- Message from ELEONORA Perez sent at 8/23/2024 11:05 AM CDT -----  Please let patient know he has UTI. Please send cefdinir 300 mg BID x 7 days.     Called patient regarding lab results, patient has a UTI, order for Cefdinir 300 mg bid times 7 days, sent to pharmacy on file. Patient verbalized understanding.

## 2024-08-26 ENCOUNTER — OFFICE VISIT (OUTPATIENT)
Dept: NEPHROLOGY | Facility: CLINIC | Age: 79
End: 2024-08-26
Payer: MEDICARE

## 2024-08-26 VITALS
OXYGEN SATURATION: 95 % | BODY MASS INDEX: 20.63 KG/M2 | HEART RATE: 78 BPM | HEIGHT: 65 IN | WEIGHT: 123.81 LBS | SYSTOLIC BLOOD PRESSURE: 92 MMHG | RESPIRATION RATE: 19 BRPM | TEMPERATURE: 97 F | DIASTOLIC BLOOD PRESSURE: 52 MMHG

## 2024-08-26 DIAGNOSIS — N18.31 STAGE 3A CHRONIC KIDNEY DISEASE: Primary | ICD-10-CM

## 2024-08-26 DIAGNOSIS — N39.0 COMPLICATED UTI (URINARY TRACT INFECTION): ICD-10-CM

## 2024-08-26 PROCEDURE — 3074F SYST BP LT 130 MM HG: CPT | Mod: CPTII,S$GLB,,

## 2024-08-26 PROCEDURE — 1101F PT FALLS ASSESS-DOCD LE1/YR: CPT | Mod: CPTII,S$GLB,,

## 2024-08-26 PROCEDURE — 1126F AMNT PAIN NOTED NONE PRSNT: CPT | Mod: CPTII,S$GLB,,

## 2024-08-26 PROCEDURE — 3288F FALL RISK ASSESSMENT DOCD: CPT | Mod: CPTII,S$GLB,,

## 2024-08-26 PROCEDURE — 99213 OFFICE O/P EST LOW 20 MIN: CPT | Mod: S$GLB,,,

## 2024-08-26 PROCEDURE — 3078F DIAST BP <80 MM HG: CPT | Mod: CPTII,S$GLB,,

## 2024-08-26 PROCEDURE — 1159F MED LIST DOCD IN RCRD: CPT | Mod: CPTII,S$GLB,,

## 2024-08-26 PROCEDURE — 99999 PR PBB SHADOW E&M-EST. PATIENT-LVL IV: CPT | Mod: PBBFAC,,,

## 2024-08-26 RX ORDER — CALCIUM CITRATE/VITAMIN D3 200MG-6.25
TABLET ORAL
COMMUNITY
Start: 2024-07-23

## 2024-08-26 RX ORDER — CARVEDILOL 6.25 MG/1
3.12 TABLET ORAL 2 TIMES DAILY
Qty: 90 TABLET | Refills: 1 | Status: SHIPPED | OUTPATIENT
Start: 2024-08-26 | End: 2025-02-22

## 2024-08-26 RX ORDER — LANCETS 33 GAUGE
EACH MISCELLANEOUS
COMMUNITY
Start: 2024-07-23

## 2024-08-26 NOTE — PROGRESS NOTES
[facility]    HPI  Gustavo Lanza, 78 y.o. male, presents to office for follow up of CKD3a. His baseline creatinine is 1.3. He c/o of urinary frequency and is taking antibiotics for UTI again. Also has hx of BPH; was followed by Dr. Lam but has not had an appointment in a while. Pt states they were not finding cause of his symptoms to he felt like he was wasting their time.      Medical Diagnoses:   Past Medical History:   Diagnosis Date    AAA (abdominal aortic aneurysm)     Atherosclerosis of arteries of extremities     BPH (benign prostatic hyperplasia) 08/03/2022    Carotid artery stenosis     Chronic idiopathic constipation 08/03/2022    COPD (chronic obstructive pulmonary disease)     Coronary artery disease involving native coronary artery of native heart without angina pectoris 08/03/2022    Impaired fasting glucose 08/03/2022    Mixed hyperlipidemia 08/03/2022    Raynaud disease 08/03/2022    S/P AAA repair 08/03/2022     Patient Active Problem List   Diagnosis    Stage 3a chronic kidney disease    S/P AAA repair    COPD (chronic obstructive pulmonary disease)    Coronary artery disease involving native coronary artery of native heart without angina pectoris    Mixed hyperlipidemia    Raynaud disease    Chronic idiopathic constipation    Impaired fasting glucose    BPH (benign prostatic hyperplasia)    Retrograde ejaculation    Peripheral vascular disease, unspecified    Prediabetes    Microalbuminuria    PSA elevation    Aneurysm of descending thoracic aorta without rupture    Insomnia    Leg weakness, bilateral    Medicare annual wellness visit, subsequent    Spinal stenosis of lumbar region with neurogenic claudication    Neural foraminal stenosis of lumbar spine    Return to work evaluation    Complicated UTI (urinary tract infection)       Surgical History:   Past Surgical History:   Procedure Laterality Date    ABDOMINAL AORTIC ANEURYSM REPAIR  03/13/2020     Surgeon: Dr. Ovalles    AORTOGRAM W/ BLE RUNOFF Bilateral 05/30/2024    Mg Cullen MD    CARDIAC SURGERY  01/30/2014    CABG    COLONOSCOPY  11/12/2021    CARLOS MOHAMUD MD    HERNIA REPAIR Left 03/23/2016    PERIPHERAL ANGIOGRAPHY  12/18/2015    Femoral Popliteal revas w/stent    REPAIR OF HEART VALVE         Family History:   Family History   Problem Relation Name Age of Onset    No Known Problems Mother      No Known Problems Father      No Known Problems Sister      No Known Problems Brother         Social History:   Social History     Tobacco Use    Smoking status: Every Day     Current packs/day: 0.25     Types: Cigarettes     Passive exposure: Current    Smokeless tobacco: Never    Tobacco comments:     10 cigarettes a day.   Substance Use Topics    Alcohol use: Never       Allergies:  Review of patient's allergies indicates:   Allergen Reactions    Nitrofurantoin monohyd/m-cryst        Medications:    Current Outpatient Medications:     albuterol (PROVENTIL/VENTOLIN HFA) 90 mcg/actuation inhaler, Inhale 2 puffs into the lungs every 6 (six) hours as needed for Wheezing or Shortness of Breath., Disp: 18 g, Rfl: 5    albuterol-ipratropium (DUO-NEB) 2.5 mg-0.5 mg/3 mL nebulizer solution, Take 3 mLs by nebulization every 6 (six) hours as needed for Wheezing. Rescue, Disp: 75 mL, Rfl: 2    aspirin (ECOTRIN) 81 MG EC tablet, Take 81 mg by mouth once daily., Disp: , Rfl:     bisacodyL (DULCOLAX) 5 mg EC tablet, Take 5 mg by mouth daily as needed., Disp: , Rfl:     carvediloL (COREG) 6.25 MG tablet, Take 6.25 mg by mouth 2 (two) times daily., Disp: , Rfl:     cefdinir (OMNICEF) 300 MG capsule, Take 1 capsule (300 mg total) by mouth 2 (two) times daily. for 7 days, Disp: 14 capsule, Rfl: 0    finasteride (PROSCAR) 5 mg tablet, Take 1 tablet (5 mg total) by mouth once daily., Disp: 90 tablet, Rfl: 3    rosuvastatin (CRESTOR) 5 MG tablet, Take 1 tablet (5 mg total) by mouth nightly., Disp: 90 tablet, Rfl: 3    tamsulosin  "(FLOMAX) 0.4 mg Cap, TAKE 1 CAPSULE ONE TIME DAILY, Disp: 90 capsule, Rfl: 3    TRUE METRIX GLUCOSE TEST STRIP Strp, SMARTSIG:Via Meter, Disp: , Rfl:     TRUEPLUS LANCETS 33 gauge Misc, Apply topically., Disp: , Rfl:        Review of Systems:    Constitutional: Denies fever, fatigue, generalized weakness  Skin: Denies wounds, no rashes, no itching, no new skin lesions  Respiratory:  Denies cough, shortness of breath, or wheezing  Cardiovascular: Denies chest pain, palpitations, or swelling  Gastrointestional: Denies abdominal pain, nausea, vomiting, diarrhea, or constipation  Genitourinary: Denies dysuria, hematuria, foamy urine, or incontinence; reports able to empty bladder  Musculoskeletal: Denies back or flank pain  Neurological: Denies headaches, occasional episodes of dizzy spells      Vital Signs:  BP (!) 92/52 (BP Location: Left arm, Patient Position: Sitting, BP Method: Medium (Automatic))   Pulse 78   Temp 96.8 °F (36 °C) (Temporal)   Resp 19   Ht 5' 5" (1.651 m)   Wt 56.2 kg (123 lb 12.8 oz)   SpO2 95%   BMI 20.60 kg/m²   Body mass index is 20.6 kg/m².      Physical Exam:    General: no acute distress, awake, alert  Eyes: conjunctiva clear, eyelids without swelling  HENT: atraumatic, oropharynx and nasal mucosa patent  Neck: supple, trache midline, full ROM, no JVD  Respiratory: equal, decreased breath sounds at the bases  Cardiovascular: RRR without rub; +2/6 KURTIS; radial and pedal pulses +2 BL  Edema: none  Gastrointestinal: soft, non-tender, non-distended  Musculoskeletal: ROM without new limitation or discomfort  Integumentary: warm, dry; no rashes, wounds, or skin lesions  Neurological: oriented x4, appropriate, no acute deficits; no asterixis      Labs:        Component Value Date/Time     08/20/2024 0704     08/05/2024 1305     06/10/2024 0714     05/31/2024 0452    K 4.1 08/20/2024 0704    K 3.8 08/05/2024 1305    K 4.4 06/10/2024 0714    K 4.1 05/31/2024 0452    CL " 103 08/20/2024 0704     08/05/2024 1305     06/10/2024 0714     05/31/2024 0452    CO2 29 08/20/2024 0704    CO2 31 08/05/2024 1305    CO2 30 06/10/2024 0714    CO2 22 05/31/2024 0452    BUN 20.8 08/20/2024 0704    BUN 20 (H) 08/05/2024 1305    BUN 21.1 06/10/2024 0714    BUN 33 (H) 05/31/2024 0452    CREATININE 1.21 (H) 08/20/2024 0704    CREATININE 1.21 08/05/2024 1305    CREATININE 1.24 (H) 06/10/2024 0714    CREATININE 1.45 (H) 05/31/2024 0452    CREATININE 1.20 (H) 05/14/2024 0644    CREATININE 1.54 (H) 04/30/2024 0635    CALCIUM 9.1 08/20/2024 0704    CALCIUM 9.5 08/05/2024 1305    CALCIUM 9.7 06/10/2024 0714    CALCIUM 9.2 05/31/2024 0452    PHOS 4.1 02/16/2024 0916    PTH 48.6 08/20/2024 0704    PTH 47.0 02/16/2024 0916           Component Value Date/Time    WBC 8.59 08/20/2024 0704    WBC 11.66 (H) 06/10/2024 0714    HGB 12.8 (L) 08/20/2024 0704    HGB 13.2 (L) 06/10/2024 0714    HCT 39.4 (L) 08/20/2024 0704    HCT 41.6 (L) 06/10/2024 0714     08/20/2024 0704     06/10/2024 0714         Imaging:  Retroperitoneal US:  1. Normal size, nonobstructed kidneys  2. Incidental left renal cyst which requires no imaging follow-up  3. Prostatomegaly  4. Abdominal aortic aneurysm status post stent graft repair.        Electronically signed by: Denisa Aguiar  Date:                                            06/20/2023  Time:                                           14:39    Impression:  1. Stage 3a chronic kidney disease    2. Complicated UTI (urinary tract infection)      CKD 3 related to nephrosclerosis.  Renal function is stable.  Another UTI; started on AB on Friday. Continues to have frequent urination every 1-2 hours.  BP low   Recent stent placement to R leg    Plan:  Patient told to avoid nonsteroidal anti-inflammatory medications.  Smoking cessation advised.      Decrease coreg to 3.125mg BID---> advised to speak with cardiologist next month about need for BB and if he can be  weaned off completely.  Continue to monitor BP at home. Denies weakness/dizziness      Pt advised to call Urologist Dr. Lam to schedule appointment with them. Previously their patient, however he felt he was wasting their time. Patient still with frequent urination and frequent UTIs. My benefit from prophylactic antibiotics; will send note and most recent urine to their office.    Renal wise stable, follow up in  6 months with labs within the week before.    Kurt Howard FNP-BC

## 2024-08-26 NOTE — PATIENT INSTRUCTIONS
Decrease coreg to 3.125mg twice daily (cut pill in half)    Speak with cardiologist (Dr. Kelly) next month about need for this medication and if you can be weaned off completely.    Continue to monitor blood pressure at home        Call Urologist Dr. Lam to schedule appointment with them. We will send note and most recent urine to their office.      Avoid NSAIDs and COX2 inhibitors: Advil (ibuprofen), Aleve (naproxen), Mobic (meloxicam), Celebrex (celecoxib), Toradol (ketorolac) and Diclofenac (voltaren), Indomethacin (indocin).  Only take tylenol (acetaminophen) occasionally if needed for aches/pains.    Avoid alcohol and soda. Limit tea and coffee.     Stay well hydrated with water  Call our office with concerns prior to next appointment    For more education on kidney disease you can visit:  [email or web address]

## 2024-09-03 ENCOUNTER — TELEPHONE (OUTPATIENT)
Dept: PRIMARY CARE CLINIC | Facility: CLINIC | Age: 79
End: 2024-09-03
Payer: MEDICARE

## 2024-09-03 NOTE — TELEPHONE ENCOUNTER
SPOKE WITH PATIENT , HE WANTED SOMETHING SOONER. ASKED HIM IF IT WAS SOMETHING SEVERE TO GO TO URGENT CARE AND IF HE WANTED TO BE ADDED TOT HE WAITLIST. DECLINE

## 2024-09-03 NOTE — TELEPHONE ENCOUNTER
----- Message from Myla Adam sent at 9/3/2024  9:14 AM CDT -----  Regarding: call back  .Who Called: Gustavo Lanza    Caller is requesting assistance/information from provider's office.    Symptoms (please be specific): lower back pain/trouble walking    How long has patient had these symptoms:    List of preferred pharmacies on file (remove unneeded): [unfilled]  If different, enter pharmacy into here including location and phone number:       Preferred Method of Contact: Phone Call  Patient's Preferred Phone Number on File: [phone]   Best Call Back Number, if different:  Additional Information: pt requesting a call back

## 2024-09-09 ENCOUNTER — TELEPHONE (OUTPATIENT)
Dept: PRIMARY CARE CLINIC | Facility: CLINIC | Age: 79
End: 2024-09-09
Payer: MEDICARE

## 2024-09-09 NOTE — TELEPHONE ENCOUNTER
----- Message from Leidy Escalante sent at 9/9/2024 12:27 PM CDT -----  Regarding: back pain  .Type:  Needs Medical Advice    Who Called: pt  Symptoms (please be specific): back pain   How long has patient had these symptoms:  2 weeks  Pharmacy name and phone #:    Would the patient rather a call back or a response via [facility]?   Best Call Back Number:  [phone]  Additional Information: pt is having back pains last two [id number] available appt oct 23

## 2024-09-16 PROBLEM — Z00.00 MEDICARE ANNUAL WELLNESS VISIT, SUBSEQUENT: Status: RESOLVED | Noted: 2024-06-12 | Resolved: 2024-09-16

## 2024-09-30 ENCOUNTER — TELEPHONE (OUTPATIENT)
Dept: PRIMARY CARE CLINIC | Facility: CLINIC | Age: 79
End: 2024-09-30
Payer: MEDICARE

## 2024-09-30 NOTE — TELEPHONE ENCOUNTER
Returned pt's call. Pt states he had a fall on 9/29/24. Pt reports he is experiencing leg/back pain. Pt scheduled for 10/1/24. Advised pt to report to ER or UC for further evaluation if needed. PT verbalized understanding.

## 2024-10-01 ENCOUNTER — OFFICE VISIT (OUTPATIENT)
Dept: PRIMARY CARE CLINIC | Facility: CLINIC | Age: 79
End: 2024-10-01
Payer: MEDICARE

## 2024-10-01 VITALS
RESPIRATION RATE: 18 BRPM | TEMPERATURE: 98 F | BODY MASS INDEX: 20.83 KG/M2 | OXYGEN SATURATION: 95 % | DIASTOLIC BLOOD PRESSURE: 66 MMHG | HEART RATE: 72 BPM | WEIGHT: 125 LBS | SYSTOLIC BLOOD PRESSURE: 126 MMHG | HEIGHT: 65 IN

## 2024-10-01 DIAGNOSIS — M54.50 ACUTE BILATERAL LOW BACK PAIN WITHOUT SCIATICA: Primary | ICD-10-CM

## 2024-10-01 DIAGNOSIS — F17.200 CURRENT SMOKER: ICD-10-CM

## 2024-10-01 DIAGNOSIS — M48.061 NEURAL FORAMINAL STENOSIS OF LUMBAR SPINE: ICD-10-CM

## 2024-10-01 DIAGNOSIS — M48.062 SPINAL STENOSIS OF LUMBAR REGION WITH NEUROGENIC CLAUDICATION: ICD-10-CM

## 2024-10-01 PROCEDURE — 1100F PTFALLS ASSESS-DOCD GE2>/YR: CPT | Mod: CPTII,,, | Performed by: STUDENT IN AN ORGANIZED HEALTH CARE EDUCATION/TRAINING PROGRAM

## 2024-10-01 PROCEDURE — 1160F RVW MEDS BY RX/DR IN RCRD: CPT | Mod: CPTII,,, | Performed by: STUDENT IN AN ORGANIZED HEALTH CARE EDUCATION/TRAINING PROGRAM

## 2024-10-01 PROCEDURE — 3288F FALL RISK ASSESSMENT DOCD: CPT | Mod: CPTII,,, | Performed by: STUDENT IN AN ORGANIZED HEALTH CARE EDUCATION/TRAINING PROGRAM

## 2024-10-01 PROCEDURE — 1125F AMNT PAIN NOTED PAIN PRSNT: CPT | Mod: CPTII,,, | Performed by: STUDENT IN AN ORGANIZED HEALTH CARE EDUCATION/TRAINING PROGRAM

## 2024-10-01 PROCEDURE — 3074F SYST BP LT 130 MM HG: CPT | Mod: CPTII,,, | Performed by: STUDENT IN AN ORGANIZED HEALTH CARE EDUCATION/TRAINING PROGRAM

## 2024-10-01 PROCEDURE — 3078F DIAST BP <80 MM HG: CPT | Mod: CPTII,,, | Performed by: STUDENT IN AN ORGANIZED HEALTH CARE EDUCATION/TRAINING PROGRAM

## 2024-10-01 PROCEDURE — 1159F MED LIST DOCD IN RCRD: CPT | Mod: CPTII,,, | Performed by: STUDENT IN AN ORGANIZED HEALTH CARE EDUCATION/TRAINING PROGRAM

## 2024-10-01 PROCEDURE — 99214 OFFICE O/P EST MOD 30 MIN: CPT | Mod: ,,, | Performed by: STUDENT IN AN ORGANIZED HEALTH CARE EDUCATION/TRAINING PROGRAM

## 2024-10-01 RX ORDER — GABAPENTIN 100 MG/1
CAPSULE ORAL
Qty: 90 CAPSULE | Refills: 11 | Status: SHIPPED | OUTPATIENT
Start: 2024-10-01 | End: 2024-10-16 | Stop reason: SDUPTHER

## 2024-10-01 RX ORDER — ESZOPICLONE 2 MG/1
2 TABLET, FILM COATED ORAL NIGHTLY
COMMUNITY

## 2024-10-01 RX ORDER — TRAZODONE HYDROCHLORIDE 150 MG/1
150 TABLET ORAL NIGHTLY
COMMUNITY
Start: 2024-09-18

## 2024-10-01 NOTE — PROGRESS NOTES
Subjective:       Patient ID: Gustavo Lanza is a 78 y.o. male.    -------------------------------------    AAA (abdominal aortic aneurysm)    Atherosclerosis of arteries of extremities    BPH (benign prostatic hyperplasia)    Carotid artery stenosis    Chronic idiopathic constipation    COPD (chronic obstructive pulmonary disease)    Coronary artery disease involving native coronary artery of native heart without angina pectoris    Disorder of kidney and ureter    Impaired fasting glucose    Mixed hyperlipidemia    Raynaud disease    S/P AAA repair        Chief Complaint: fall, Back Pain, and Leg Pain    C/o falls. Same issue as addressed at multiple visits. Falls preceded by leg paresthesias. He can prevent falls by kneeling and waiting for symptoms to improve. Takes about 1 minute to resolve.       Review of Systems   Constitutional:  Negative for chills and fever.   HENT:  Negative for sinus pressure/congestion and sore throat.    Respiratory:  Negative for cough, shortness of breath and wheezing.    Cardiovascular:  Negative for chest pain and leg swelling.   Gastrointestinal:  Negative for abdominal pain, nausea and vomiting.   Genitourinary:  Negative for dysuria and hematuria.   Musculoskeletal:  Negative for arthralgias and myalgias.   Integumentary:  Negative for rash.   Neurological:  Positive for weakness (legs). Negative for dizziness and syncope.   Hematological:  Negative for adenopathy.   Psychiatric/Behavioral:  Negative for confusion. The patient is not nervous/anxious.            Objective:      Physical Exam  Vitals and nursing note reviewed.   Constitutional:       General: He is not in acute distress.  HENT:      Head: Normocephalic.      Nose: No rhinorrhea.   Eyes:      Conjunctiva/sclera: Conjunctivae normal.      Pupils: Pupils are equal, round, and reactive to light.   Cardiovascular:      Rate and Rhythm: Normal rate and regular rhythm.      Heart sounds: Murmur (systolic) heard.    Pulmonary:      Effort: Pulmonary effort is normal.      Comments: BS decreased throughout  Abdominal:      Palpations: Abdomen is soft.      Tenderness: There is no abdominal tenderness.   Musculoskeletal:         General: No deformity or signs of injury.   Skin:     General: Skin is warm and dry.   Neurological:      General: No focal deficit present.      Mental Status: He is alert. Mental status is at baseline.   Psychiatric:         Mood and Affect: Mood normal.         Behavior: Behavior normal.           Assessment & Plan:   1. Acute bilateral low back pain without sciatica  Comments:  worsened back pain recenlty after fall, XR to r/o acute injury like compression fracture  Orders:  -     X-Ray Lumbar Spine AP And Lateral; Future; Expected date: 10/01/2024  -     gabapentin (NEURONTIN) 100 MG capsule; Take 1 capsule by mouth at night for 5 days. Then, increase to 1 capsule by mouth 3 times daily.  Dispense: 90 capsule; Refill: 11    2. Spinal stenosis of lumbar region with neurogenic claudication  Overview:  See MRI 6/12/24 - severe lumbar spinal stenosis and neural foramen stenosis    Assessment & Plan:  Chronic issue, worsening  Falls increasing  He's following Vascular who reports his episodic symptoms are not related to vascular disease  Referring to neurosurgery suspecting neurogenic claudications    Orders:  -     Ambulatory referral/consult to Neurosurgery  -     gabapentin (NEURONTIN) 100 MG capsule; Take 1 capsule by mouth at night for 5 days. Then, increase to 1 capsule by mouth 3 times daily.  Dispense: 90 capsule; Refill: 11    3. Neural foraminal stenosis of lumbar spine  -     Ambulatory referral/consult to Neurosurgery  -     gabapentin (NEURONTIN) 100 MG capsule; Take 1 capsule by mouth at night for 5 days. Then, increase to 1 capsule by mouth 3 times daily.  Dispense: 90 capsule; Refill: 11    4. Current smoker  -     Ambulatory referral/consult to Smoking Cessation Program; Future;  Expected date: 10/08/2024        Keep scheduled f/u. In addition to their scheduled follow up, the patient has also been instructed to follow up on as needed basis.

## 2024-10-03 ENCOUNTER — CLINICAL SUPPORT (OUTPATIENT)
Dept: SMOKING CESSATION | Facility: CLINIC | Age: 79
End: 2024-10-03
Payer: COMMERCIAL

## 2024-10-03 DIAGNOSIS — F17.200 NICOTINE DEPENDENCE: Primary | ICD-10-CM

## 2024-10-03 PROCEDURE — 99404 PREV MED CNSL INDIV APPRX 60: CPT | Mod: S$GLB,,,

## 2024-10-03 RX ORDER — MICONAZOLE NITRATE 2 %
2 CREAM (GRAM) TOPICAL
Qty: 120 EACH | Refills: 0 | Status: SHIPPED | OUTPATIENT
Start: 2024-10-03

## 2024-10-03 RX ORDER — IBUPROFEN 200 MG
1 TABLET ORAL DAILY
Qty: 28 PATCH | Refills: 0 | Status: SHIPPED | OUTPATIENT
Start: 2024-10-03

## 2024-10-03 NOTE — PROGRESS NOTES
Patient will be participating in tobacco cessation meetings and will begin the prescribed tobacco cessation medication regimen of 21 mg nicotine patch QD and 2 mg nicotine gum PRN (1-2 per hour in place of cigarettes). Patient currently smokes 10 cigarettes per day.  Discussed the role of tobacco cessation program, role of nicotine replacement therapy (NRT) and behavioral changes to assist the patient to reach his goal of being tobacco free. Education and instruction on the role of the NRT, usage and proper placement of the patch and gum. Pt started on rate reduction and wait time of 15 min prior to smoking. Pt's exhaled carbon monoxide level was 12 ppm as per Smokerlyzer. (non- smoker = 0-5 ppm.)

## 2024-10-08 ENCOUNTER — TELEPHONE (OUTPATIENT)
Dept: NEUROSURGERY | Facility: CLINIC | Age: 79
End: 2024-10-08
Payer: MEDICARE

## 2024-10-08 NOTE — TELEPHONE ENCOUNTER
Patient referred to Dr. Espana by Dr. Gabino Pimentel for spinal stenosis of lumbar region with neurogenic claudication and neural foraminal stenosis of lumbar spine.     Patient complaining of back pain that radiates to bilateral lower extremities associated with lower extremity weakness and numbness.  Patient states legs get weak and go numb then he feels a burning sensation in his back.  Patient states he experiences frequent falls due to symptoms.  Patient stated back pain started about 4 months ago following a fall.  Patient denies any surgeries, physical therapy, or additional imaging in regards to pain.  Patient also states he is not currently taking any medication to manage the pain and he has only followed up with referring provider in regards to pain and weakness.      Patient had xray of the lumbar spine 10/1/24.  Impression:   Mild chronic superior endplate compression fracture deformity of L1 without acute fracture.  Lumbar degenerative disease and additional chronic findings as above.    Patient had MRI of the lumbar spine on 6/12/24.  Impression:   Age indeterminate compression deformity of the L1 vertebral body with approximately 40% height loss anteriorly. No significant retropulsion of fracture fragments.   Multilevel degenerative changes.    Please review and advise.  Referring provider's 10/1/24 progress note is currently still open and incomplete.

## 2024-10-08 NOTE — TELEPHONE ENCOUNTER
Please schedule the patient sooner rather than later on either my or Alexandra's schedule. Thanks

## 2024-10-11 NOTE — PROGRESS NOTES
[facility]  History & Physical  Neurosurgery      Gustavo Lanza   [id number]   1945       SUBJECTIVE:     CHIEF COMPLAINT:  Lower back pain, leg pain, leg weakness, neck pain    HPI:  Gustavo Lanza is a 78 y.o. male who presents for neurosurgical evaluation at the recommendation of Dr. Gabino Pimentel. The patient presents today describing lower back pain as well as progressive lower extremity weakness.  He also has recently begun to have some neck pain and burning.  The patient states his lower extremity weakness has continued to progress to the point that he was falling several times a day.  He was denying any issues with bowel or bladder function.  The patient states upon standing he will have a numb, hot and burning sensation that radiates up from his feet into the proximal lower extremities and then evolves into lower back pain.  He states his only relief is sitting down or sitting onto the floor to prevent a fall.  He states this has been ongoing for more than 4 months and has continued to worsen.  He is denying any type of conservative management in the form of chiropractic care, physical therapy, injections or medication. The patient has a significant history of AAA repair, atherosclerosis, BPH, COPD, CAD, hyperlipidemia and Raynaud's.     Past Medical History:   Diagnosis Date    AAA (abdominal aortic aneurysm)     Atherosclerosis of arteries of extremities     BPH (benign prostatic hyperplasia) 08/03/2022    Carotid artery stenosis     Chronic idiopathic constipation 08/03/2022    COPD (chronic obstructive pulmonary disease)     Coronary artery disease involving native coronary artery of native heart without angina pectoris 08/03/2022    Disorder of kidney and ureter     Impaired fasting glucose 08/03/2022    Mixed hyperlipidemia 08/03/2022    Raynaud disease 08/03/2022    S/P AAA repair 08/03/2022       Past Surgical History:   Procedure Laterality Date    ABDOMINAL AORTIC  ANEURYSM REPAIR  03/13/2020    Surgeon: Dr. Ovalles    AORTOGRAM W/ BLE RUNOFF Bilateral 05/30/2024    Mg Cullen MD    CARDIAC SURGERY  01/30/2014    CABG    COLONOSCOPY  11/12/2021    CARLOS MOHAMUD MD    HERNIA REPAIR Left 03/23/2016    PERIPHERAL ANGIOGRAPHY  12/18/2015    Femoral Popliteal revas w/stent    REPAIR OF HEART VALVE         Family History   Problem Relation Name Age of Onset    No Known Problems Mother      No Known Problems Father      No Known Problems Sister      No Known Problems Brother         Social History     Socioeconomic History    Marital status:    Tobacco Use    Smoking status: Former     Current packs/day: 0.50     Average packs/day: 0.9 packs/day for 58.5 years (54.6 ttl pk-yrs)     Types: Cigarettes     Start date: 1966     Quit date: 01/2012     Passive exposure: Past    Smokeless tobacco: Never    Tobacco comments:     10 cigarettes a day.          Quit 2 weeks ago   Substance and Sexual Activity    Alcohol use: Never    Drug use: Never    Sexual activity: Yes     Social Drivers of Health     Financial Resource Strain: Low Risk  (6/12/2024)    Overall Financial Resource Strain (CARDIA)     Difficulty of Paying Living Expenses: Not hard at all   Food Insecurity: No Food Insecurity (6/12/2024)    Hunger Vital Sign     Worried About Running Out of Food in the Last Year: Never true     Ran Out of Food in the Last Year: Never true   Transportation Needs: No Transportation Needs (6/12/2024)    TRANSPORTATION NEEDS     Transportation : No   Physical Activity: Insufficiently Active (6/12/2024)    Exercise Vital Sign     Days of Exercise per Week: 3 days     Minutes of Exercise per Session: 30 min   Stress: Stress Concern Present (6/12/2024)    [employer] - Occupational Stress Questionnaire     Feeling of Stress : Rather much   Housing Stability: Low Risk  (6/12/2024)    Housing Stability Vital Sign     Unable to Pay for Housing in the Last Year: No      Homeless in the Last Year: No        Review of patient's allergies indicates:   Allergen Reactions    Nitrofurantoin monohyd/m-cryst         Current Outpatient Medications   Medication Instructions    albuterol (PROVENTIL/VENTOLIN HFA) 90 mcg/actuation inhaler 2 puffs, Inhalation, Every 6 hours PRN    albuterol-ipratropium (DUO-NEB) 2.5 mg-0.5 mg/3 mL nebulizer solution 3 mLs, Nebulization, Every 6 hours PRN, Rescue    aspirin (ECOTRIN) 81 mg, Daily    bisacodyL (DULCOLAX) 5 mg, Daily PRN    carvediloL (COREG) 3.125 mg, Oral, 2 times daily    eszopiclone (LUNESTA) 2 mg, Nightly    finasteride (PROSCAR) 5 mg, Oral, Daily    nicotine (NICODERM CQ) 21 mg/24 hr 1 patch, Transdermal, Daily    nicotine (polacrilex) (NICORETTE) 2 mg, Oral, As needed (PRN)    rosuvastatin (CRESTOR) 5 mg, Oral, Nightly    tamsulosin (FLOMAX) 0.4 mg, Oral    traZODone (DESYREL) 150 mg, Nightly    TRUE METRIX GLUCOSE TEST STRIP Strp SMARTSIG:Via Meter    TRUEPLUS LANCETS 33 gauge Misc Apply topically.    UNABLE TO FIND MEDICAL MARIJUANA    UNABLE TO FIND Suppositories for constipation          Review of Systems   Constitutional:  Negative for chills, fever and weight loss.   HENT:  Negative for congestion, hearing loss, nosebleeds and tinnitus.    Eyes:  Negative for blurred vision, double vision and photophobia.   Respiratory:  Negative for cough, shortness of breath and wheezing.    Cardiovascular:  Negative for chest pain, palpitations and leg swelling.   Gastrointestinal:  Negative for constipation, diarrhea, nausea and vomiting.   Genitourinary:  Negative for dysuria, frequency and urgency.   Musculoskeletal:  Positive for back pain and neck pain. Negative for falls.   Skin:  Negative for itching and rash.   Neurological:  Positive for tingling (bilateral lower extremities) and weakness (bilateral lower extremities). Negative for dizziness, tremors, sensory change, speech change, seizures, loss of consciousness and headaches.  "  Psychiatric/Behavioral:  Negative for depression, hallucinations and memory loss. The patient is not nervous/anxious.        OBJECTIVE:     Visit Vitals  /63 (BP Location: Left arm, Patient Position: Sitting)   Pulse 71   Resp 16   Ht 5' 6" (1.676 m)   Wt 56.2 kg (124 lb)   BMI 20.01 kg/m²        Physical Exam    General:  Pleasant, Well-nourished, Well-groomed.    Cardiovascular:  Neck is supple.  Heart has regular rate and rhythm.    Lungs:  Breathing is quiet, non-lablored    Abdomen:  Soft, non-tender, non-distended.    Neurological:  Muscle strength against resistance:   Right Left   Deltoid (C5) 4+/5 4+/5   Biceps (C5/6) 4+/5 4+/5   Wrist Flexors (C5/6) 5/5 5/5   Triceps (C7) 4+/5 4+/5   Wrist extension (C7) 5/5 5/5   Finger abduction (C8) 5/5 5/5    5/5 5/5        Hip abduction 5/5 5/5   Hip adduction 5/5 5/5   Hip flexion (L2) 5/5 5/5   Knee extension (L3) 5/5 5/5   Knee flexion (L4) 5/5 5/5   Dorsiflexion (L5) 5/5 5/5   EHL (L5) 5/5 5/5   Plantar flexion (S1) 5/5 5/5   Sensation is intact to primary modalities in bilateral upper and lower extremities.    Reflexes:   Right Left   Triceps (C7) 3+ 2+   Biceps (C5) 3+ 2+   Brachioradialis (C6) 3+ 2+   Patellar (L4) 3+ 3+   Achilles (S1) 3+ 3+   Negative Clonus, Mercado bilaterally.  Gait is limping, shuffling, and very cautious  Difficulty with walking on heels and toes  No tremor noted.    Imaging:  All pertinent neuroimaging independently reviewed. Discussed these findings in detail with the patient.    Bone density scan dated 6/24/2024 reveals osteoporosis in the bilateral femoral necks and proximal femur with a T-score of -0.8 in the lumbar spine    MRI of the lumbar spine dated 6/12/2024 reveals T12-L1 with mild bulging and facet hypertrophy with moderate bilateral foraminal stenosis.  L1-2 and L2-3 with mild bulging, facet hypertrophy and moderate bilateral foraminal stenosis.  L3-4 with mild bulging, facet hypertrophy, moderate canal stenosis " and moderate bilateral foraminal stenosis.  L4-5 with disc bulging, facet hypertrophy, severe canal stenosis with severe left-greater-than-right foraminal stenosis.  L5-S1 with disc bulging, facet hypertrophy and moderate left foraminal stenosis.  Chronic appearing L1 compression fracture with a proximally 40% height loss is also noted    X-rays of the lumbar spine dated 10/1/2024 reveal 5 non-rib-bearing vertebral bodies with chronic appearing compression fracture at L1.  Grade 1 anterolisthesis at L4-5 with mild disc space narrowing.  Mild disc space narrowing noted as well at L3-4 with degenerative changes of the posterior elements greatest from L4-S1      ASSESSMENT:       ICD-10-CM ICD-9-CM   1. Spinal stenosis of lumbar region with neurogenic claudication  M48.062 724.03   2. Lumbar radiculopathy  M54.16 724.4   3. Cervical radiculopathy  M54.12 723.4     PLAN:     1. Spinal stenosis of lumbar region with neurogenic claudication (Primary)  - Ambulatory referral/consult to Pain Clinic; Future  - Ambulatory referral/consult to Physical/Occupational Therapy; Future    2. Lumbar radiculopathy  - X-Ray Lumbar Spine Flexion And Extension Only; Future    3. Cervical radiculopathy  - MRI Cervical Spine Without Contrast; Future  - X-Ray Cervical Spine 5 View W Flex Extxt; Future    Gustavo Lanza presents today reporting chronic and progressive lower back pain with bilateral lower extremity weakness.  He also has recently begun to have similar symptoms into the neck and arms.  Given the hyperreflexia and weakness on examination today I would like to move forward with an MRI of the cervical spine with x-rays.  We will obtain flexion-extension views of the lumbar spine as well.  Should there be no contraindication on these images we will move forward with physical therapy and possible injections.  Referrals have been placed today as well for these modalities.  I will notify the patient once these images are  available for my review regarding moving forward with these referrals.  We did discuss potential red flag findings for which I would like the proportionate to report should they develop.  He verbalizes understanding today.  We will tentatively schedule the patient a follow up visit with Dr. Espana in approximately 6 weeks to review these images, re-evaluate his symptoms and discuss potential treatment options going forward..    Follow up in about 6 weeks (around 11/28/2024) for With Imaging Prior to Appt, PT Follow Up, with Jovi.      E/M Level Based On Time:   15 minutes spent on reviewing chart, which includes interpreting lab results and diagnostic tests.   20 minutes spent in the room with the patient performing a history and physical exam, counseling or educating the patient/caregiver, prescribing medications, ordering labwork/diagnostic tests, or placing referrals.   0 minutes spent collaborating plan of care with physician.   5 minutes spent documenting all relevant clinical informationin the electronic health record.     Total Time Spent: 40 minutes       DIANELYS Cameron    Disclaimer:  This note is prepared using voice recognition software and as such is likely to have errors despite attempts at proofreading. Please contact me for questions.

## 2024-10-13 NOTE — ASSESSMENT & PLAN NOTE
Chronic issue, worsening  Falls increasing  He's following Vascular who reports his episodic symptoms are not related to vascular disease  Referring to neurosurgery suspecting neurogenic claudications

## 2024-10-14 ENCOUNTER — TELEPHONE (OUTPATIENT)
Dept: PRIMARY CARE CLINIC | Facility: CLINIC | Age: 79
End: 2024-10-14
Payer: MEDICARE

## 2024-10-14 DIAGNOSIS — M48.062 SPINAL STENOSIS OF LUMBAR REGION WITH NEUROGENIC CLAUDICATION: ICD-10-CM

## 2024-10-14 DIAGNOSIS — M54.50 ACUTE BILATERAL LOW BACK PAIN WITHOUT SCIATICA: ICD-10-CM

## 2024-10-14 DIAGNOSIS — M48.061 NEURAL FORAMINAL STENOSIS OF LUMBAR SPINE: ICD-10-CM

## 2024-10-14 NOTE — TELEPHONE ENCOUNTER
Pt called to report ongoing back pain. Pt was seen in office on 10/1/24. Pt states he was prescribed gabapentin 100 mg 3 times daily sand medication is not working. Pt has an appt with Dr. Ramirez on 10/16/24. Advised pt to report to UC or ER for further evaluation if needed. Advised that a message would be sent to Dr. Pimentel. Pt verbalized understanding.

## 2024-10-16 RX ORDER — GABAPENTIN 300 MG/1
300 CAPSULE ORAL 3 TIMES DAILY
Qty: 90 CAPSULE | Refills: 11 | Status: SHIPPED | OUTPATIENT
Start: 2024-10-16 | End: 2024-10-17

## 2024-10-17 ENCOUNTER — OFFICE VISIT (OUTPATIENT)
Dept: NEUROSURGERY | Facility: CLINIC | Age: 79
End: 2024-10-17
Payer: MEDICARE

## 2024-10-17 VITALS
BODY MASS INDEX: 19.93 KG/M2 | SYSTOLIC BLOOD PRESSURE: 109 MMHG | DIASTOLIC BLOOD PRESSURE: 63 MMHG | WEIGHT: 124 LBS | HEIGHT: 66 IN | RESPIRATION RATE: 16 BRPM | HEART RATE: 71 BPM

## 2024-10-17 DIAGNOSIS — M54.12 CERVICAL RADICULOPATHY: ICD-10-CM

## 2024-10-17 DIAGNOSIS — M54.16 LUMBAR RADICULOPATHY: ICD-10-CM

## 2024-10-17 DIAGNOSIS — M48.062 SPINAL STENOSIS OF LUMBAR REGION WITH NEUROGENIC CLAUDICATION: Primary | ICD-10-CM

## 2024-10-21 ENCOUNTER — HOSPITAL ENCOUNTER (OUTPATIENT)
Dept: RADIOLOGY | Facility: HOSPITAL | Age: 79
Discharge: HOME | End: 2024-10-21
Attending: NURSE PRACTITIONER
Payer: MEDICARE

## 2024-10-21 DIAGNOSIS — M54.16 LUMBAR RADICULOPATHY: ICD-10-CM

## 2024-10-21 DIAGNOSIS — M54.12 CERVICAL RADICULOPATHY: ICD-10-CM

## 2024-10-21 PROCEDURE — 72120 X-RAY BEND ONLY L-S SPINE: CPT | Mod: TC

## 2024-10-21 PROCEDURE — 72052 X-RAY EXAM NECK SPINE 6/>VWS: CPT | Mod: TC

## 2024-10-23 ENCOUNTER — TELEPHONE (OUTPATIENT)
Dept: NEUROSURGERY | Facility: CLINIC | Age: 79
End: 2024-10-23
Payer: MEDICARE

## 2024-10-23 ENCOUNTER — CLINICAL SUPPORT (OUTPATIENT)
Dept: SMOKING CESSATION | Facility: CLINIC | Age: 79
End: 2024-10-23
Payer: COMMERCIAL

## 2024-10-23 DIAGNOSIS — F17.200 NICOTINE DEPENDENCE: ICD-10-CM

## 2024-10-23 PROCEDURE — 99404 PREV MED CNSL INDIV APPRX 60: CPT | Mod: S$GLB,,,

## 2024-10-23 RX ORDER — DM/P-EPHED/ACETAMINOPH/DOXYLAM 30-7.5/3
2 LIQUID (ML) ORAL
Qty: 144 LOZENGE | Refills: 0 | Status: SHIPPED | OUTPATIENT
Start: 2024-10-23

## 2024-10-23 RX ORDER — IBUPROFEN 200 MG
1 TABLET ORAL DAILY
Qty: 28 PATCH | Refills: 0 | Status: SHIPPED | OUTPATIENT
Start: 2024-10-23

## 2024-10-23 RX ORDER — IBUPROFEN 200 MG
1 TABLET ORAL DAILY
Qty: 28 PATCH | Refills: 0 | Status: SHIPPED | OUTPATIENT
Start: 2024-10-23 | End: 2024-10-23

## 2024-10-23 NOTE — TELEPHONE ENCOUNTER
[facility] for patient asking him to return my call.  I have reviewed his cervical MRI as well as cervical x-rays and lumbar x-rays.  There are no profoundly worrisome findings on these images.  I would like the patient to move forward with physical therapy and pain management consultation as discussed in clinic.

## 2024-10-23 NOTE — PROGRESS NOTES
Individual Follow-Up Form    10/23/2024    Quit Date: 10/4/24    Clinical Status of Patient: Outpatient    Length of Service: 60 minutes    Continuing Medication: yes  Patches    Other Medications: Nicotine lozenge     Target Symptoms: Withdrawal and medication side effects. The following were  rated moderate (3) to severe (4) on TCRS:  Moderate (3): none  Severe (4): none    Comments: Pt presents for follow up.  Pt is tobacco free.  Pt stated that he smoked his last cigarette when he left the office after his initial visit and has not smoked since.  Pt's quit day was 10/4/24.  Congratulated pt on his quit.  Discussed withdrawal symptoms.  Pt stated that he is not having any withdrawals and is not have any urges/cravings.  Pt has decreased from 3 cups of coffee in the morning to only one.  Discussed smoking triggers.  Pt stated that he was smoking out of habit and boredom. Discussed coping skills.  Encouraged pt to keep himself busy. Pt has been keeping himself busy by surfing the internet and chatting with his friends. Pt stated that when he came into the office for his first visit, he was determined to quit smoking. Briefly discussed slips and relapses. Pt remains on tobacco cessation regimen of 21 mg nicotine patch QD.  No adverse effects noted at this time.  Pt stated that he never received the nicotine gum.  Will decrease to 14 mg nicotine patch.  Pt requested to try the nicotine lozenges.  Discussed usage and placement of lozenge.  Will prescribe 2 mg nicotine lozenge. Commended pt on his quit.  Encouraged pt to remain tobacco free and to call if he needs any additional support. Pt's exhaled carbon monoxide level was 1 ppm as per Smokerlyzer. 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

## 2024-10-24 NOTE — TELEPHONE ENCOUNTER
Spoke with the patient regarding his MRI and x-ray findings.  We will move forward with a consultation with Dr. Diana as well as physical therapy at this time.  He was also scheduled for further consultation with Dr. Espana on 12/10/2024.

## 2024-10-31 ENCOUNTER — TELEPHONE (OUTPATIENT)
Dept: NEUROSURGERY | Facility: CLINIC | Age: 79
End: 2024-10-31
Payer: MEDICARE

## 2024-11-13 ENCOUNTER — OFFICE VISIT (OUTPATIENT)
Facility: CLINIC | Age: 79
End: 2024-11-13
Payer: MEDICARE

## 2024-11-13 VITALS
BODY MASS INDEX: 20.09 KG/M2 | HEIGHT: 66 IN | WEIGHT: 125 LBS | DIASTOLIC BLOOD PRESSURE: 60 MMHG | SYSTOLIC BLOOD PRESSURE: 105 MMHG | RESPIRATION RATE: 20 BRPM

## 2024-11-13 DIAGNOSIS — M54.31 BILATERAL SCIATICA: ICD-10-CM

## 2024-11-13 DIAGNOSIS — R29.6 FALLING: ICD-10-CM

## 2024-11-13 DIAGNOSIS — M54.32 BILATERAL SCIATICA: ICD-10-CM

## 2024-11-13 DIAGNOSIS — M48.062 SPINAL STENOSIS OF LUMBAR REGION WITH NEUROGENIC CLAUDICATION: Primary | ICD-10-CM

## 2024-11-13 PROCEDURE — 1126F AMNT PAIN NOTED NONE PRSNT: CPT | Mod: CPTII,,, | Performed by: NURSE PRACTITIONER

## 2024-11-13 PROCEDURE — 1159F MED LIST DOCD IN RCRD: CPT | Mod: CPTII,,, | Performed by: NURSE PRACTITIONER

## 2024-11-13 PROCEDURE — 3078F DIAST BP <80 MM HG: CPT | Mod: CPTII,,, | Performed by: NURSE PRACTITIONER

## 2024-11-13 PROCEDURE — 3288F FALL RISK ASSESSMENT DOCD: CPT | Mod: CPTII,,, | Performed by: NURSE PRACTITIONER

## 2024-11-13 PROCEDURE — 1160F RVW MEDS BY RX/DR IN RCRD: CPT | Mod: CPTII,,, | Performed by: NURSE PRACTITIONER

## 2024-11-13 PROCEDURE — 1100F PTFALLS ASSESS-DOCD GE2>/YR: CPT | Mod: CPTII,,, | Performed by: NURSE PRACTITIONER

## 2024-11-13 PROCEDURE — 3074F SYST BP LT 130 MM HG: CPT | Mod: CPTII,,, | Performed by: NURSE PRACTITIONER

## 2024-11-13 PROCEDURE — 99204 OFFICE O/P NEW MOD 45 MIN: CPT | Mod: ,,, | Performed by: NURSE PRACTITIONER

## 2024-11-13 RX ORDER — GABAPENTIN 300 MG/1
300 CAPSULE ORAL 2 TIMES DAILY
Qty: 60 CAPSULE | Refills: 3 | Status: SHIPPED | OUTPATIENT
Start: 2024-11-13 | End: 2024-12-13

## 2024-11-13 RX ORDER — DOXEPIN HYDROCHLORIDE 150 MG/1
150 CAPSULE ORAL NIGHTLY
COMMUNITY

## 2024-11-13 NOTE — PROGRESS NOTES
[facility]    Subjective:     Chief Complaint: Back Pain (The patient reports that he has back pain and it can be triggered by standing for too long and sitting for too long. /The patient reports his pain level at a 0 this morning.   /Denies PT or any spinal  procedure. )    Referred by: Suyapa Ramirez FNP     History of Present Illness: Gustavo Lanza is a 78 y.o. 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  And frequent falling.  Additionally he has history of a lumbar compression fracture in his not being treated for osteoporosis at this time.  He also has recently begun to have some neck pain and burning.  The patient states his lower extremity weakness has continued to progress to the point that he was falling several times a day.  He has an independent ambulator and does not use a cane or Rollator to ambulate.  He is not interested in a Rollator at this time either.  He confirmed he has some new fecal incontinence and urinary retention.  Negative saddle anesthesia.  He denies dropping items in his hands and no spasticity to his hands or feet.  Patient relayed his pain started 2 years ago spontaneously.  His primary pain is located to bilateral low back, bilateral hips and bilateral legs.  The most intense pain is located to his bilateral posterior calves.  The patient states upon standing he will have a numb, hot and burning sensation that radiates up from his feet into the proximal lower extremities and then evolves into lower back pain.  He states his only relief is sitting down or sitting onto the floor to prevent a fall.  He states this has been ongoing for more than 4 months and has continued to worsen.  He is denying any type of conservative management in the  "form of chiropractic care, physical therapy, injections or medication.  Patient also has chronic kidney disease stage 3 and was not aware of this.  He is also interested in starting physical therapy to see if this will help.  Unfortunately he fell 3 times yesterday in his not interested in a Rollator at this time.  Patient was also unaccompanied.  Review of his MRI shows L3-L4 moderate spinal stenosis and L4-L5 with severe spinal stenosis.  He states his pain feels sharp and has a guarded gait due to trying to avoid falling.  His pain score will elevate to a 10/10 on the NRS with walking, standing and most any activities.  He is not taking anything other than rare Tylenol for pain.  This is due to CKD stage 3.        Pertinent PMH:  Abdominal aortic aneurysm, atherosclerosis of arteries, BPH, CAD, chronic obstructive pulmonary disease, CAD, disorder of kidney and ureter, mixed, Raynaud's disease hyperlipidemia, pertinent PSH:  AAA repair 2020, repair of heart valve, no pacemaker, spinal cord stimulator, defibrillator or spinal surgery    Vital signs:   Visit Vitals  /60 (BP Location: Left arm, Patient Position: Sitting)   Resp 20   Ht 5' 6" (1.676 m)   Wt 56.7 kg (125 lb)   BMI 20.18 kg/m²      Vitals:    11/13/24 0910   PainSc: 0-No pain     Pain Disability Index (PDI): 65       Interventional Pain History  None    ROS: Low back and leg pain    Lumbar MRI 2024:  FINDINGS:  Alignment: Normal.     Vertebrae: Compression deformities of the L1 vertebral body with marrow edema and approximately 40% height loss anteriorly.     Discs: Normal height and signal.     Cord: Normal.  Conus terminates at T12-L1     Degenerative findings:     T12-L1: Small disc bulge and facet arthropathy result in mild to moderate bilateral foraminal stenosis.     L1-L2: Disc bulge and facet arthropathy result in moderate bilateral foraminal stenosis.     L2-L3: Disc bulge and facet arthropathy result in moderate bilateral foraminal " stenosis.     L3-L4: Disc bulge and facet arthropathy result in moderate bilateral foraminal and mild to moderate spinal canal stenosis with mild compression of the left L4 nerve root in the lateral recess.     L4-L5: Disc bulge and facet arthropathy result in moderate right and moderate to severe left foraminal and moderate to severe spinal canal stenosis, with moderate compression of the left L5 nerve root in lateral recess.     L5-S1: Disc bulge and facet arthropathy result in moderate left foraminal stenosis.     Paraspinal muscles & soft tissues: Unremarkable.     Impression:     Age indeterminate compression deformity of the L1 vertebral body with approximately 40% height loss anteriorly.  No significant retropulsion of fracture fragments.     Multilevel degenerative changes as detailed above.        Electronically signed by:Jose Alberto Foster  Date:                                            06/14/2024  Time:                                           09:26    Objective:        Physical Exam  General: Well developed; underweight A&O x 3; No anxiety/depression; NAD  Mental Status: Oriented to person, palce and time. Displays appropriate mood & affect.  Head: Norm cephalic and atraumatic  Neck:  No cervical paraspinal banding.  Full range of motion with lateral turning and cervical flexion +extension.  Eyes: normal conjunctiva, normal lids, normal pupils  ENT and mouth: normal external ear, nose, and no lesions noted on the lips.  Respiratory: Symmetrical, Unlabored. No dyspnea  CV: normal rhythm and rate. No peripheral edema.   Abdomen: Non-distended    Extremities:  Gen: No cyanosis or tenderness to palpation bilateral upper and lower extremities  Skin: Warm, pink, dry, no rashes, no lesions on the lumbar spine  Strength: 5/5 motor strength bilateral upper and lower extremities  ROM: Full ROM in bilateral knees and ankles without pain or instability.    Neuro:  Gait:  Antalgic shuffling gait., normal toe and heel  raise. Independent ambulator.  DTR's:  3+ in bilateral patellar,   Sensory: Intact to light touch bilateral  upper and lower extremities    Spine: Normal lordosis. No scoliosis  L-spine ROM limited and pain ROM to flexion, extension, bilateral rotation,   Straight Leg Raise:  Positive bilateral  SI Joint: No tenderness to palpation bilaterally.      Assessment:   Gustavo Lanza is a 78 y.o. 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  And frequent falling.  Additionally he has history of a lumbar compression fracture in his not being treated for osteoporosis at this time.  He also has recently begun to have some neck pain and burning.  The patient states his lower extremity weakness has continued to progress to the point that he was falling several times a day.  He has an independent ambulator and does not use a cane or Rollator to ambulate.  He is not interested in a Rollator at this time either.  He confirmed he has some new fecal incontinence and urinary retention.  Negative saddle anesthesia.  He denies dropping items in his hands and no spasticity to his hands or feet.  Patient relayed his pain started 2 years ago spontaneously.  His primary pain is located to bilateral low back, bilateral hips and bilateral legs.  The most intense pain is located to his bilateral posterior calves.  The patient states upon standing he will have a numb, hot and burning sensation that radiates up from his feet into the proximal lower extremities and then evolves into lower back pain.  He states his only relief is sitting down or sitting onto the floor to prevent a fall.  He states this has been ongoing for more than 4 months and has continued to worsen.  He is denying any type of conservative management in  "the form of chiropractic care, physical therapy, injections or medication.  Patient also has chronic kidney disease stage 3 and was not aware of this.  He is also interested in starting physical therapy to see if this will help.  Unfortunately he fell 3 times yesterday in his not interested in a Rollator at this time.  Patient was also unaccompanied.  Review of his MRI shows L3-L4 moderate spinal stenosis and L4-L5 with severe spinal stenosis.  He states his pain feels sharp and has a guarded gait due to trying to avoid falling.  His pain score will elevate to a 10/10 on the NRS with walking, standing and most any activities.  He is not taking anything other than rare Tylenol for pain.  This is due to CKD stage 3.      Plan of Care:   PT [facility] 8-10 weeks  Follow up in  10 weeks or sooner to evaluate pain post PT  Patient to talk to Primary MD about CKD diagnosis.   Encounter Diagnoses   Name Primary?    Spinal stenosis of lumbar region with neurogenic claudication Yes    Bilateral sciatica     Falling          Plan:       Gustavo Latham" was seen today for back pain.    Diagnoses and all orders for this visit:    Spinal stenosis of lumbar region with neurogenic claudication  -     Ambulatory referral/consult to Pain Clinic  -     Ambulatory referral/consult to Physical/Occupational Therapy; Future  -     gabapentin (NEURONTIN) 300 MG capsule; Take 1 capsule (300 mg total) by mouth 2 (two) times daily.    Bilateral sciatica  -     Ambulatory referral/consult to Physical/Occupational Therapy; Future  -     gabapentin (NEURONTIN) 300 MG capsule; Take 1 capsule (300 mg total) by mouth 2 (two) times daily.    Falling           Past Medical History:   Diagnosis Date    AAA (abdominal aortic aneurysm)     Atherosclerosis of arteries of extremities     BPH (benign prostatic hyperplasia) 08/03/2022    Carotid artery stenosis     Chronic idiopathic constipation 08/03/2022    COPD (chronic obstructive pulmonary disease)  "    Coronary artery disease involving native coronary artery of native heart without angina pectoris 2022    Disorder of kidney and ureter     Impaired fasting glucose 2022    Mixed hyperlipidemia 2022    Raynaud disease 2022    S/P AAA repair 2022       Past Surgical History:   Procedure Laterality Date    ABDOMINAL AORTIC ANEURYSM REPAIR  2020    Surgeon: Dr. Ovalles    AORTOGRAM W/ BLE RUNOFF Bilateral 2024    Mg Cullen MD    CARDIAC SURGERY  2014    CABG    COLONOSCOPY  2021    CARLOS MOHAMUD MD    HERNIA REPAIR Left 2016    PERIPHERAL ANGIOGRAPHY  2015    Femoral Popliteal revas w/stent    REPAIR OF HEART VALVE         Family History   Problem Relation Name Age of Onset    No Known Problems Mother      No Known Problems Father      No Known Problems Sister      No Known Problems Brother         Social History     Socioeconomic History    Marital status:    Tobacco Use    Smoking status: Former     Current packs/day: 0.00     Average packs/day: 0.9 packs/day for 58.5 years (54.6 ttl pk-yrs)     Types: Cigarettes     Start date:      Quit date: 10/4/2024     Years since quittin.1     Passive exposure: Past    Smokeless tobacco: Never    Tobacco comments:     Quit 10/4/24   Substance and Sexual Activity    Alcohol use: Never    Drug use: Never    Sexual activity: Yes     Social Drivers of Health     Financial Resource Strain: Low Risk  (2024)    Overall Financial Resource Strain (CARDIA)     Difficulty of Paying Living Expenses: Not hard at all   Food Insecurity: No Food Insecurity (2024)    Hunger Vital Sign     Worried About Running Out of Food in the Last Year: Never true     Ran Out of Food in the Last Year: Never true   Transportation Needs: No Transportation Needs (2024)    TRANSPORTATION NEEDS     Transportation : No   Physical Activity: Insufficiently Active (2024)    Exercise Vital Sign     Days of Exercise  per Week: 3 days     Minutes of Exercise per Session: 30 min   Stress: Stress Concern Present (6/12/2024)    [employer] [place] [employer] - Occupational Stress Questionnaire     Feeling of Stress : Rather much   Housing Stability: Low Risk  (6/12/2024)    Housing Stability Vital Sign     Unable to Pay for Housing in the Last Year: No     Homeless in the Last Year: No       Current Outpatient Medications   Medication Sig Dispense Refill    albuterol (PROVENTIL/VENTOLIN HFA) 90 mcg/actuation inhaler Inhale 2 puffs into the lungs every 6 (six) hours as needed for Wheezing or Shortness of Breath. 18 g 5    aspirin (ECOTRIN) 81 MG EC tablet Take 81 mg by mouth once daily.      bisacodyL (DULCOLAX) 5 mg EC tablet Take 5 mg by mouth daily as needed.      carvediloL (COREG) 6.25 MG tablet Take 0.5 tablets (3.125 mg total) by mouth 2 (two) times daily. 90 tablet 1    doxepin (SINEQUAN) 150 MG Cap Take 150 mg by mouth every evening.      eszopiclone (LUNESTA) 2 MG Tab Take 2 mg by mouth every evening.      finasteride (PROSCAR) 5 mg tablet Take 1 tablet (5 mg total) by mouth once daily. 90 tablet 3    nicotine (NICODERM CQ) 14 mg/24 hr Place 1 patch onto the skin once daily. 28 patch 0    rosuvastatin (CRESTOR) 5 MG tablet Take 1 tablet (5 mg total) by mouth nightly. 90 tablet 3    tamsulosin (FLOMAX) 0.4 mg Cap TAKE 1 CAPSULE ONE TIME DAILY 90 capsule 3    traZODone (DESYREL) 150 MG tablet Take 150 mg by mouth every evening.      TRUE METRIX GLUCOSE TEST STRIP Strp SMARTSIG:Via Meter      TRUEPLUS LANCETS 33 gauge Misc Apply topically.      UNABLE TO FIND MEDICAL MARIJUANA      UNABLE TO FIND Suppositories for constipation      albuterol-ipratropium (DUO-NEB) 2.5 mg-0.5 mg/3 mL nebulizer solution Take 3 mLs by nebulization every 6 (six) hours as needed for Wheezing. Rescue (Patient not taking: Reported on 11/13/2024) 75 mL 2    gabapentin (NEURONTIN) 300 MG capsule Take 1 capsule (300 mg total) by mouth 2 (two)  times daily. 60 capsule 3    nicotine polacrilex 2 MG Lozg Take 1 lozenge (2 mg total) by mouth as needed (Do not exceed more than 10 pieces in 24 hours). (Patient not taking: Reported on 11/13/2024) 144 lozenge 0     No current facility-administered medications for this visit.       Review of patient's allergies indicates:   Allergen Reactions    Nitrofurantoin monohyd/m-cryst

## 2024-11-18 ENCOUNTER — CLINICAL SUPPORT (OUTPATIENT)
Dept: SMOKING CESSATION | Facility: CLINIC | Age: 79
End: 2024-11-18
Payer: COMMERCIAL

## 2024-11-18 DIAGNOSIS — F17.200 NICOTINE DEPENDENCE: Primary | ICD-10-CM

## 2024-11-18 PROCEDURE — 99404 PREV MED CNSL INDIV APPRX 60: CPT | Mod: S$GLB,,,

## 2024-11-18 RX ORDER — NICOTINE 7MG/24HR
1 PATCH, TRANSDERMAL 24 HOURS TRANSDERMAL DAILY
Qty: 28 PATCH | Refills: 0 | Status: SHIPPED | OUTPATIENT
Start: 2024-11-18

## 2024-11-18 RX ORDER — DM/P-EPHED/ACETAMINOPH/DOXYLAM 30-7.5/3
2 LIQUID (ML) ORAL
Qty: 144 LOZENGE | Refills: 0 | Status: SHIPPED | OUTPATIENT
Start: 2024-11-18

## 2024-11-18 NOTE — PROGRESS NOTES
Individual Follow-Up Form    11/18/2024    Quit Date: 10/4/24    Clinical Status of Patient: Outpatient    Length of Service: 60 minutes    Continuing Medication: yes  Patches or Nicotine Lozenges    Other Medications: none     Target Symptoms: Withdrawal and medication side effects. The following were  rated moderate (3) to severe (4) on TCRS:  Moderate (3): none  Severe (4): none    Comments: Pt presents for follow up.  Pt remains tobacco free.  Pt has not smoked since 10/4/24.  Pt remains on tobacco cessation regimen of 14 mg nicotine patch QD and 2 mg nicotine lozenge PRN.  No adverse effects noted at this time.  Pt is using 3-4 lozenges per day.  Discussed nicotine withdrawals.  Pt stated that he had an urge to smoke last night after his dog passed away.  Pt stated that he did not have any cigarettes and he realized that smoking would not change the situation or make him feel better.  Discussed deep breathing, exercise, meditation or prayer are better ways to deal with stress.  Pt has noticed that his since of taste has improved and he is eating more.  Pt stated that he is not snacking but his portion sizes has increased. Reviewed strategies, habitual behavior, stress, and high risk situations. Introduced stress with addition interventions, SOLVE, relaxation with interventions, nutrition, exercise, weight gain, and the importance of rewarding oneself for accomplishments toward becoming tobacco free. Commended pt on his progress.  Will decrease to 7 mg nicotine patch.  Encouraged pt to remain tobacco free and call if he needs any additional support. Pt's exhaled carbon monoxide level was 2 ppm as per Smokerlyzer. 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

## 2024-11-27 ENCOUNTER — TELEPHONE (OUTPATIENT)
Dept: NEUROSURGERY | Facility: CLINIC | Age: 79
End: 2024-11-27
Payer: MEDICARE

## 2024-11-27 NOTE — TELEPHONE ENCOUNTER
I tried to call the patient to see if he has initiated physical therapy so I can obtain his PT notes prior to his upcoming appointment with Dr. Espana. He did not answer so I left a detailed message requesting a call back.

## 2024-12-12 ENCOUNTER — CLINICAL SUPPORT (OUTPATIENT)
Dept: SMOKING CESSATION | Facility: CLINIC | Age: 79
End: 2024-12-12
Payer: COMMERCIAL

## 2024-12-12 DIAGNOSIS — F17.200 NICOTINE DEPENDENCE: Primary | ICD-10-CM

## 2024-12-12 PROCEDURE — 99403 PREV MED CNSL INDIV APPRX 45: CPT | Mod: S$GLB,,,

## 2024-12-12 RX ORDER — NICOTINE 7MG/24HR
1 PATCH, TRANSDERMAL 24 HOURS TRANSDERMAL DAILY
Qty: 28 PATCH | Refills: 0 | Status: SHIPPED | OUTPATIENT
Start: 2024-12-12

## 2024-12-12 RX ORDER — DM/P-EPHED/ACETAMINOPH/DOXYLAM 30-7.5/3
2 LIQUID (ML) ORAL
Qty: 144 LOZENGE | Refills: 0 | Status: SHIPPED | OUTPATIENT
Start: 2024-12-12

## 2024-12-12 NOTE — PROGRESS NOTES
Individual Follow-Up Form    12/12/2024    Quit Date: 10/4/24    Clinical Status of Patient: Outpatient    Length of Service: 45 minutes    Continuing Medication: yes  Patches or Nicotine Lozenges    Other Medications: none     Target Symptoms: Withdrawal and medication side effects. The following were  rated moderate (3) to severe (4) on TCRS:  Moderate (3): none  Severe (4): none    Comments: Pt arrived late due to going to a different office. Pt appears a little confused this morning.  Pt did state that he drove all night from [place]. Pt presents for follow up regarding smoking cessation progress.  Pt remains tobacco free.  Pt remains on tobacco cessation regimen of 7 mg nicotine patch QD and 2 mg nicotine lozenge PRN.  No adverse effects noted at this time.  Pt stated that he was in [place] and while he was there he did slip and smoked 2 cigarettes.  Discussed what caused him to slip and how he could have handled the situation differently.  Reviewed strategies, habitual behavior, high risks situations, understanding urges and cravings, stress and relaxation with open discussion and additional interventions, Introduced lapses, relapses, understanding them and analyzing the situation of a lapse, conflict issues that may be linked to a lapse. Encouraged pt to remain tobacco free and to call if he needs any additional support.  Pt's exhaled carbon monoxide level was 24 ppm as per Smokerlyzer. (non- smoker = 0-5 ppm.) Pt is adamant that he has not been smoking cigarettes.  Pt does admit to smoking marijuana and is using tobacco paper to place in it. Discussed that tobacco paper still has nicotine. Will follow up with pt in a few weeks.  Pt requested next appointment be via phone as he will be in [place] next month.       Diagnosis: F17.200    Next Visit: 2 weeks

## 2024-12-16 DIAGNOSIS — R73.03 PREDIABETES: Primary | ICD-10-CM

## 2024-12-17 ENCOUNTER — TELEPHONE (OUTPATIENT)
Dept: PRIMARY CARE CLINIC | Facility: CLINIC | Age: 79
End: 2024-12-17
Payer: MEDICARE

## 2024-12-17 RX ORDER — LANCETS 33 GAUGE
EACH MISCELLANEOUS
Qty: 100 EACH | Refills: 3 | Status: SHIPPED | OUTPATIENT
Start: 2024-12-17

## 2024-12-17 RX ORDER — CALCIUM CITRATE/VITAMIN D3 200MG-6.25
TABLET ORAL
Qty: 100 STRIP | Refills: 3 | Status: SHIPPED | OUTPATIENT
Start: 2024-12-17

## 2024-12-20 ENCOUNTER — TELEPHONE (OUTPATIENT)
Dept: PRIMARY CARE CLINIC | Facility: CLINIC | Age: 79
End: 2024-12-20
Payer: MEDICARE

## 2024-12-20 NOTE — TELEPHONE ENCOUNTER
Returned pt's call. LVM advising of upcoming appt on 12/23/24. Requested a return call to confirm.

## 2024-12-23 ENCOUNTER — OFFICE VISIT (OUTPATIENT)
Dept: PRIMARY CARE CLINIC | Facility: CLINIC | Age: 79
End: 2024-12-23
Payer: MEDICARE

## 2024-12-23 ENCOUNTER — OFFICE VISIT (OUTPATIENT)
Dept: NEUROSURGERY | Facility: CLINIC | Age: 79
End: 2024-12-23
Payer: MEDICARE

## 2024-12-23 VITALS
HEART RATE: 84 BPM | HEIGHT: 66 IN | OXYGEN SATURATION: 97 % | RESPIRATION RATE: 16 BRPM | WEIGHT: 124 LBS | BODY MASS INDEX: 19.93 KG/M2 | DIASTOLIC BLOOD PRESSURE: 72 MMHG | SYSTOLIC BLOOD PRESSURE: 113 MMHG | TEMPERATURE: 98 F

## 2024-12-23 VITALS
DIASTOLIC BLOOD PRESSURE: 75 MMHG | BODY MASS INDEX: 20.06 KG/M2 | WEIGHT: 124.81 LBS | RESPIRATION RATE: 16 BRPM | HEIGHT: 66 IN | SYSTOLIC BLOOD PRESSURE: 122 MMHG | HEART RATE: 69 BPM

## 2024-12-23 DIAGNOSIS — R29.6 FALLING EPISODES: ICD-10-CM

## 2024-12-23 DIAGNOSIS — M47.812 CERVICAL SPONDYLOSIS: ICD-10-CM

## 2024-12-23 DIAGNOSIS — R41.3 OTHER AMNESIA: ICD-10-CM

## 2024-12-23 DIAGNOSIS — F51.01 PRIMARY INSOMNIA: ICD-10-CM

## 2024-12-23 DIAGNOSIS — G89.29 CHRONIC BILATERAL LOW BACK PAIN WITH BILATERAL SCIATICA: ICD-10-CM

## 2024-12-23 DIAGNOSIS — M54.2 CHRONIC NECK PAIN: ICD-10-CM

## 2024-12-23 DIAGNOSIS — M54.41 CHRONIC BILATERAL LOW BACK PAIN WITH BILATERAL SCIATICA: ICD-10-CM

## 2024-12-23 DIAGNOSIS — R41.3 POOR MEMORY: ICD-10-CM

## 2024-12-23 DIAGNOSIS — G31.84 MILD COGNITIVE IMPAIRMENT: Primary | ICD-10-CM

## 2024-12-23 DIAGNOSIS — M47.816 LUMBAR SPONDYLOSIS: Primary | ICD-10-CM

## 2024-12-23 DIAGNOSIS — M54.42 CHRONIC BILATERAL LOW BACK PAIN WITH BILATERAL SCIATICA: ICD-10-CM

## 2024-12-23 DIAGNOSIS — G89.29 CHRONIC NECK PAIN: ICD-10-CM

## 2024-12-23 PROBLEM — Z76.89 RETURN TO WORK EVALUATION: Status: RESOLVED | Noted: 2024-08-09 | Resolved: 2024-12-23

## 2024-12-23 PROCEDURE — 3288F FALL RISK ASSESSMENT DOCD: CPT | Mod: CPTII,,, | Performed by: STUDENT IN AN ORGANIZED HEALTH CARE EDUCATION/TRAINING PROGRAM

## 2024-12-23 PROCEDURE — 3288F FALL RISK ASSESSMENT DOCD: CPT | Mod: CPTII,,, | Performed by: NEUROLOGICAL SURGERY

## 2024-12-23 PROCEDURE — 99215 OFFICE O/P EST HI 40 MIN: CPT | Mod: ,,, | Performed by: STUDENT IN AN ORGANIZED HEALTH CARE EDUCATION/TRAINING PROGRAM

## 2024-12-23 PROCEDURE — 1125F AMNT PAIN NOTED PAIN PRSNT: CPT | Mod: CPTII,,, | Performed by: NEUROLOGICAL SURGERY

## 2024-12-23 PROCEDURE — 1160F RVW MEDS BY RX/DR IN RCRD: CPT | Mod: CPTII,,, | Performed by: STUDENT IN AN ORGANIZED HEALTH CARE EDUCATION/TRAINING PROGRAM

## 2024-12-23 PROCEDURE — 3078F DIAST BP <80 MM HG: CPT | Mod: CPTII,,, | Performed by: NEUROLOGICAL SURGERY

## 2024-12-23 PROCEDURE — 1101F PT FALLS ASSESS-DOCD LE1/YR: CPT | Mod: CPTII,,, | Performed by: NEUROLOGICAL SURGERY

## 2024-12-23 PROCEDURE — 3074F SYST BP LT 130 MM HG: CPT | Mod: CPTII,,, | Performed by: STUDENT IN AN ORGANIZED HEALTH CARE EDUCATION/TRAINING PROGRAM

## 2024-12-23 PROCEDURE — 1159F MED LIST DOCD IN RCRD: CPT | Mod: CPTII,,, | Performed by: STUDENT IN AN ORGANIZED HEALTH CARE EDUCATION/TRAINING PROGRAM

## 2024-12-23 PROCEDURE — 99214 OFFICE O/P EST MOD 30 MIN: CPT | Mod: ,,, | Performed by: NEUROLOGICAL SURGERY

## 2024-12-23 PROCEDURE — 3078F DIAST BP <80 MM HG: CPT | Mod: CPTII,,, | Performed by: STUDENT IN AN ORGANIZED HEALTH CARE EDUCATION/TRAINING PROGRAM

## 2024-12-23 PROCEDURE — 3074F SYST BP LT 130 MM HG: CPT | Mod: CPTII,,, | Performed by: NEUROLOGICAL SURGERY

## 2024-12-23 PROCEDURE — 1101F PT FALLS ASSESS-DOCD LE1/YR: CPT | Mod: CPTII,,, | Performed by: STUDENT IN AN ORGANIZED HEALTH CARE EDUCATION/TRAINING PROGRAM

## 2024-12-23 RX ORDER — DONEPEZIL HYDROCHLORIDE 5 MG/1
5 TABLET, FILM COATED ORAL NIGHTLY
Qty: 30 TABLET | Refills: 11 | Status: SHIPPED | OUTPATIENT
Start: 2024-12-23 | End: 2025-12-23

## 2024-12-23 RX ORDER — ROSUVASTATIN CALCIUM 10 MG/1
TABLET, COATED ORAL
COMMUNITY
Start: 2024-11-30

## 2024-12-23 NOTE — ASSESSMENT & PLAN NOTE
MMSE 22/30 today (performed by me). Education level - finished college.  Mild cognitive impairment present. He was not oriented to year (thought it was 2034 after several attempts, struggled with serial 7's and backwards spelling on MMSE).  Workup as delineated below.     We did discuss several of his medications may affect cognition. He has significant insomnia. Given overlapping issues of cognitive impairment/insomnia and medications being taken, we'll start referral to Neurology.    Starting trial of Aricept.

## 2024-12-23 NOTE — PROGRESS NOTES
"Subjective:       Patient ID: Gustavo Lanza is a 79 y.o. male.    -------------------------------------    AAA (abdominal aortic aneurysm)    Abdominal aortic aneurysm, without rupture, unspecified    Acute bilateral low back pain without sciatica    Aneurysm of descending thoracic aorta without rupture    Atherosclerosis of arteries of extremities    Benign prostatic hyperplasia without lower urinary tract symptoms    BPH (benign prostatic hyperplasia)    Carotid artery stenosis    Chronic idiopathic constipation    Compression fracture of L1 vertebra, initial encounter    COPD (chronic obstructive pulmonary disease)    Coronary artery disease involving native coronary artery of native heart without angina pectoris    Disorder of kidney and ureter    Hyperlipidemia, unspecified    Impaired fasting glucose    Leg weakness, bilateral    Mixed hyperlipidemia    Neural foraminal stenosis of lumbar spine    Nonrheumatic mitral (valve) stenosis    Occlusion and stenosis of bilateral carotid arteries    Peripheral vascular disease, unspecified    Presence of other vascular implants and grafts    Primary insomnia    Raynaud disease    S/P AAA repair    Solitary pulmonary nodule    Spinal stenosis of lumbar region with neurogenic claudication    Spinal stenosis, lumbosacral region    Stage 3a chronic kidney disease        Chief Complaint: Follow-up and Hypertension    C/o worsening memory. Present 1.5 to 2 years. Forgets where he parked. Forgets names of people even those he's known for a long time.  Denies safety concerns, doesn't leave stove on, doesn't forget to lock doors.  Denies getting lost while driving or lost in stores.     Back pain, falls, etc - has appt later today with Neurosurgery. Has been evaluated by Pain Management as well. Reports he still falls daily ("legs give out"). See previous notes for workup and details.       Review of Systems   Constitutional:  Negative for chills and fever.   HENT:  " Negative for sinus pressure/congestion and sore throat.    Respiratory:  Negative for cough, shortness of breath and wheezing.    Cardiovascular:  Negative for chest pain and leg swelling.   Gastrointestinal:  Negative for abdominal pain, nausea and vomiting.   Genitourinary:  Negative for dysuria and hematuria.   Musculoskeletal:  Negative for arthralgias and myalgias.   Integumentary:  Negative for rash.   Neurological:  Positive for weakness (legs, chronic) and memory loss. Negative for dizziness and syncope.   Hematological:  Negative for adenopathy.   Psychiatric/Behavioral:  Positive for confusion. The patient is not nervous/anxious.            Objective:      Physical Exam  Vitals and nursing note reviewed.   Constitutional:       General: He is not in acute distress.  HENT:      Head: Normocephalic.      Nose: No rhinorrhea.   Eyes:      Conjunctiva/sclera: Conjunctivae normal.      Pupils: Pupils are equal, round, and reactive to light.   Cardiovascular:      Rate and Rhythm: Normal rate and regular rhythm.      Heart sounds: Murmur (systolic) heard.   Pulmonary:      Effort: Pulmonary effort is normal.      Comments: BS decreased throughout  Abdominal:      Palpations: Abdomen is soft.      Tenderness: There is no abdominal tenderness.   Musculoskeletal:         General: No deformity or signs of injury.   Skin:     General: Skin is warm and dry.   Neurological:      General: No focal deficit present.      Mental Status: He is alert. Mental status is at baseline.   Psychiatric:         Mood and Affect: Mood normal.         Behavior: Behavior normal.           Assessment & Plan:   1. Mild cognitive impairment  Assessment & Plan:  MMSE 22/30 today (performed by me). Education level - finished college.  Mild cognitive impairment present. He was not oriented to year (thought it was 2034 after several attempts, struggled with serial 7's and backwards spelling on MMSE).  Workup as delineated below.     We did  discuss several of his medications may affect cognition. He has significant insomnia. Given overlapping issues of cognitive impairment/insomnia and medications being taken, we'll start referral to Neurology.    Starting trial of Aricept.    Orders:  -     Ambulatory referral/consult to Neurology  -     donepeziL (ARICEPT) 5 MG tablet; Take 1 tablet (5 mg total) by mouth every evening.  Dispense: 30 tablet; Refill: 11  -     SYPHILIS ANTIBODY (WITH REFLEX RPR); Future; Expected date: 01/23/2025  -     Vitamin B12; Future; Expected date: 01/23/2025  -     Folate; Future; Expected date: 01/23/2025  -     TSH; Future; Expected date: 01/23/2025    2. Other amnesia  -     CT Head Without Contrast; Future; Expected date: 12/30/2024  -     SYPHILIS ANTIBODY (WITH REFLEX RPR); Future; Expected date: 01/23/2025  -     Vitamin B12; Future; Expected date: 01/23/2025  -     Folate; Future; Expected date: 01/23/2025  -     TSH; Future; Expected date: 01/23/2025    3. Primary insomnia  -     Ambulatory referral/consult to Neurology        Follow up in about 2 months (around 2/23/2025) for Memory. In addition to their scheduled follow up, the patient has also been instructed to follow up on as needed basis.     I spent a total of 55 minutes on the day of the visit.This includes face to face time and non-face to face time preparing to see the patient (eg, review of tests), obtaining and/or reviewing separately obtained history, documenting clinical information in the electronic or other health record, independently interpreting results and communicating results to the patient/family/caregiver, or care coordinator.

## 2024-12-23 NOTE — PROGRESS NOTES
[facility]   Neurosurgery      Gustavo Lanza  MRN: [id number], CSN: [id number]      : 1945   Age: 79 y.o. male  Payor: [employer] MEDICARE / Plan: [employer] MEDICARE HMO / Product Type: Capitation /       Ref:  No referring provider defined for this encounter.    PCP: Gabino Pimentel MD    Visit Date: 2024     Patient Active Problem List   Diagnosis    Stage 3a chronic kidney disease    S/P AAA repair    COPD (chronic obstructive pulmonary disease)    Coronary artery disease involving native coronary artery of native heart without angina pectoris    Mixed hyperlipidemia    Raynaud disease    Chronic idiopathic constipation    Impaired fasting glucose    BPH (benign prostatic hyperplasia)    Retrograde ejaculation    Peripheral vascular disease, unspecified    Prediabetes    Microalbuminuria    PSA elevation    Aneurysm of descending thoracic aorta without rupture    Insomnia    Leg weakness, bilateral    Spinal stenosis of lumbar region with neurogenic claudication    Neural foraminal stenosis of lumbar spine    Complicated UTI (urinary tract infection)       SUBJECTIVE:      CC:   Chief Complaint   Patient presents with    chronic low back and neck pain       HPI:   Mr. Lanza is a 79 y.o. right handed male who has a past medical history significant for coronary artery disease, peripheral vascular disease, s/p AAA repair in , COPD, chronic kidney disease, BPH, and Raynaud's disease.  He presents as a follow up patient in the neurosurgery clinic for progressively worsening low back pain with bilateral lower extremity weakness in the last 6 months as well as chronic, mild neck pain.  The patient has had multiple falling episodes associated with his low back pain.    The patient's last date of visit in the neurosurgery clinic was on 10/17/2024 with ARNOLD Dale. The plan of care was to complete cervical and lumbar spine x-rays as well as a MRI cervical spine without  gadolinium.  These radiographic studies were completed with imaging results to be reviewed during today's appointment.  Mr. Lanza was referred to physical therapy and also pain management specialist Dr. Diana.  The patient has not yet started physical therapy at the [facility] facility adjacent to [facility].  In addition, he was evaluated by pain management NP Millie Nur, who prescribed Neurontin.    Mr. Lanza is a limited historian due to poor memory.  In fact, today he is scheduled to complete a CT head without contrast that was ordered by his primary care physician Dr. Gabino Pimentel.  The patient has also been referred to neurologist Dr. Harris, with a pending appointment.    Mr. Lanza reports injuring his back while serving in the [employer] and likely sustained a L1 compression fracture.  He has been highly functional until the last 6-9 months, during which time his lower back has been rated a 10/10 pain level.  His symptoms were not associated with any traumatic events.  The patient describes having left-sided low back pain that has become bilateral pain across his lower back.  With prolonged standing, Mr. Lanza has a sensation of tingling in his lower back, radiating leg pain for 3-5 minutes, followed by his bilateral legs giving out.  He has had multiple falls in the last 6 months.  His last fall was yesterday at [employer] and the patient often falls when getting out of a chair.  These falling episodes are not associated with any loss of consciousness.  Mr. Lanza states that after falling, his pain level and overall symptoms improve.    The patient also mentions having mild neck pain without any radiating arm pain.  When standing, he has intermittent numbness in his bilateral anterior upper legs radiating down into his lateral lower legs.  Mr. Lanza does not have any focal weakness, saddle anesthesia, or bowel/ bladder incontinence.  He is fully  ambulatory.    Mr. Lanza has tried taking Tylenol and Neurontin.  The patient has not initiated physical therapy because he feels that his activity level is already very good with walking his dog on a regular basis.  Although he is established with pain management specialist Dr. Diana, the patient has not completed any epidural steroid injections in his cervical or lumbar spine.  Mr. Lanza is currently enrolled in a smoking cessation program and stopped smoking cigarettes 3 months ago.  He has been wearing a nicotine patch.    The patient's established cardiologist is Dr. Kelly.  His vascular surgeon is Dr. Luna.       Patient Active Problem List    Diagnosis Date Noted    Complicated UTI (urinary tract infection) 08/26/2024    Spinal stenosis of lumbar region with neurogenic claudication 06/20/2024    Neural foraminal stenosis of lumbar spine 06/20/2024    Insomnia 03/26/2024    Leg weakness, bilateral 03/26/2024    Aneurysm of descending thoracic aorta without rupture 06/29/2023    Peripheral vascular disease, unspecified 06/06/2023    Prediabetes 06/06/2023    Microalbuminuria 06/06/2023    PSA elevation 06/06/2023    Retrograde ejaculation 06/01/2023    Stage 3a chronic kidney disease 08/03/2022    S/P AAA repair 08/03/2022    COPD (chronic obstructive pulmonary disease) 08/03/2022    Coronary artery disease involving native coronary artery of native heart without angina pectoris 08/03/2022    Mixed hyperlipidemia 08/03/2022    Raynaud disease 08/03/2022    Chronic idiopathic constipation 08/03/2022    Impaired fasting glucose 08/03/2022    BPH (benign prostatic hyperplasia) 08/03/2022     Past Medical History:   Diagnosis Date    AAA (abdominal aortic aneurysm)     Abdominal aortic aneurysm, without rupture, unspecified     Acute bilateral low back pain without sciatica     Aneurysm of descending thoracic aorta without rupture     Atherosclerosis of arteries of extremities     Benign prostatic  hyperplasia without lower urinary tract symptoms     BPH (benign prostatic hyperplasia) 08/03/2022    Carotid artery stenosis     Chronic idiopathic constipation 08/03/2022    Compression fracture of L1 vertebra, initial encounter     COPD (chronic obstructive pulmonary disease)     Coronary artery disease involving native coronary artery of native heart without angina pectoris 08/03/2022    Disorder of kidney and ureter     Hyperlipidemia, unspecified     Impaired fasting glucose 08/03/2022    Leg weakness, bilateral     Mixed hyperlipidemia 08/03/2022    Neural foraminal stenosis of lumbar spine     Nonrheumatic mitral (valve) stenosis     Occlusion and stenosis of bilateral carotid arteries     Peripheral vascular disease, unspecified     Presence of other vascular implants and grafts     Primary insomnia     Raynaud disease 08/03/2022    S/P AAA repair 08/03/2022    Solitary pulmonary nodule     Spinal stenosis of lumbar region with neurogenic claudication     Spinal stenosis, lumbosacral region     Stage 3a chronic kidney disease      Past Surgical History:   Procedure Laterality Date    ABDOMINAL AORTIC ANEURYSM REPAIR  03/13/2020    Surgeon: Dr. Ovalles    AORTOGRAM W/ BLE RUNOFF Bilateral 05/30/2024    Mg Cullen MD    CARDIAC SURGERY  01/30/2014    CABG    COLONOSCOPY  11/12/2021    CARLOS MOHAMUD MD    HERNIA REPAIR Left 03/23/2016    PERIPHERAL ANGIOGRAPHY  12/18/2015    Femoral Popliteal revas w/stent    REPAIR OF HEART VALVE         Current Outpatient Medications:     albuterol (PROVENTIL/VENTOLIN HFA) 90 mcg/actuation inhaler, Inhale 2 puffs into the lungs every 6 (six) hours as needed for Wheezing or Shortness of Breath., Disp: 18 g, Rfl: 5    albuterol-ipratropium (DUO-NEB) 2.5 mg-0.5 mg/3 mL nebulizer solution, Take 3 mLs by nebulization every 6 (six) hours as needed for Wheezing. Rescue, Disp: 75 mL, Rfl: 2    aspirin (ECOTRIN) 81 MG EC tablet, Take 81 mg by mouth once daily., Disp: , Rfl:      carvediloL (COREG) 6.25 MG tablet, Take 0.5 tablets (3.125 mg total) by mouth 2 (two) times daily., Disp: 90 tablet, Rfl: 1    donepeziL (ARICEPT) 5 MG tablet, Take 1 tablet (5 mg total) by mouth every evening., Disp: 30 tablet, Rfl: 11    eszopiclone (LUNESTA) 2 MG Tab, Take 2 mg by mouth every evening., Disp: , Rfl:     finasteride (PROSCAR) 5 mg tablet, Take 1 tablet (5 mg total) by mouth once daily., Disp: 90 tablet, Rfl: 3    nicotine (NICODERM CQ) 7 mg/24 hr, Place 1 patch onto the skin once daily., Disp: 28 patch, Rfl: 0    nicotine polacrilex 2 MG Lozg, Take 1 lozenge (2 mg total) by mouth as needed (Do not exceed more than 10 pieces in 24 hours)., Disp: 144 lozenge, Rfl: 0    rosuvastatin (CRESTOR) 10 MG tablet, , Disp: , Rfl:     TRUE METRIX GLUCOSE TEST STRIP Strp, TEST BLOOD SUGAR EVERY DAY, Disp: 100 strip, Rfl: 3    TRUEPLUS LANCETS 33 gauge Misc, TEST BLOOD SUGAR EVERY DAY, Disp: 100 each, Rfl: 3    UNABLE TO FIND, MEDICAL MARIJUANA, Disp: , Rfl:     UNABLE TO FIND, Suppositories for constipation, Disp: , Rfl:     Review of patient's allergies indicates:   Allergen Reactions    Nitrofurantoin monohyd/m-cryst        Social History     Tobacco Use    Smoking status: Former     Current packs/day: 0.00     Average packs/day: 0.9 packs/day for 58.5 years (54.6 ttl pk-yrs)     Types: Cigarettes     Start date:      Quit date: 10/4/2024     Years since quittin.2     Passive exposure: Past    Smokeless tobacco: Never    Tobacco comments:     Quit 10/4/24   Substance Use Topics    Alcohol use: Never     Occupation: Retired in , 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    The patient served in the [employer] and reports being dishonorably discharged.    Family History   Problem Relation Name Age of Onset    No Known Problems Mother      No Known Problems Father      No Known Problems Sister      No Known Problems Brother         ROS:  Constitutional:  Negative  "for chills and fever.   HENT:  Negative for congestion and sore throat.    Eyes:  Negative for blurred vision and double vision.   Respiratory:  Positive for shortness of breath, Negative for cough.  Cardiovascular:  Negative for chest pain and palpitations.   Gastrointestinal:  Positive for constipation, Negative for diarrhea, nausea and vomiting.   Musculoskeletal:  Positive for back pain and neck pain.   Neurological:  Positive for focal weakness and sensory change.  Negative for headaches.   Endo/Heme/Allergies:  Does not bruise/bleed easily.   Psychiatric/Behavioral:  Negative for depression and anxiety.      OBJECTIVE:     EXAMINATION:  /75 (BP Location: Left arm, Patient Position: Sitting)   Pulse 69   Resp 16   Ht 5' 6" (1.676 m)   Wt 56.6 kg (124 lb 12.8 oz)   BMI 20.14 kg/m²   Body Habitus: Thin    Physical Exam:  Constitutional: The patient is well-developed and cooperative, sitting comfortably in a chair    Mental Status:   GCS- 14  E-4, V-4, M-6    Opens eyes spontaneously  Oriented x 2, name and place, but not year  Normal speech  Follows commands in all extremities    Cranial nerves:  CN II: PERRL, 4 to 3 mm, brisk bilaterally  CN III, IV, and VI: extraocular movements normal, no ptosis  CN V: normal facial sensation and masseter function  CN VII: facial strength normal and symmetrical  CN VIII: decreased hearing bilaterally  CN IX and X: swallowing and phonation normal  CN XI: shoulder shrug intact bilaterally  CN XII: tongue protrusion midline    Motor:  Muscle bulk: normal in all extremities  Tone: normal in all extremities  No pronator drift    Upper extremities:  Deltoid: right 5/5; left 5/5  Biceps: right 5/5; left 5/5  Triceps: right 5/5; left 5/5  Wrist extensors: right 5/5; left 5/5  Wrist flexors: right 5/5; left 5/5  : right 5/5; left 5/5  Interosseous muscles: right 5/5; left 5/5    Lower extremities:  Hip flexors: right 5/5; left 5/5  Knee extensors: right 5/5; left " 5/5  Knee flexors: right 5/5; left 5/5  Foot dorsiflexors: right 5/5; left 5/5  Foot plantar flexors: right 5/5; left 5/5  Extensor hallucis longus: right 5/5; left 5/5    Sensation:  Normal to light touch x 4 extremities    Reflexes:  Mercados sign: right negative; left negative  Babinski: right downgoing; left downgoing  Clonus: right negative; left negative    Coordination:  Finger to nose: right normal; left normal    Musculoskeletal:    Gait: normal     Straight leg test: right negative; left negative    Cervical: Moderate pain with palpation  Upper back: No pain with palpation  Lower back: No pain with palpation      DIAGNOSTICS REVIEW OF IMAGING, LAB & OTHER STUDIES:  I have personally reviewed and evaluated the following reports as well as radiographic studies:    Cervical spine x-rays, 10/21/2024- there is multilevel degenerative disc disease with minimal retrolisthesis of C5 on C6.  On flexion, there is mild anterior spondylolisthesis of C2 on C3, C3 on C4, and C4 on C5, which all correct on extension.  This amount of movement is within physiologic range.    Lumbar spine x-rays, 10/21/2024- there is normal alignment.  At L1, there is a chronic appearing superior endplate compression fracture with 40% loss of height.  There is no retropulsion or kyphosis.  There is no motion on flexion-extension views.    MRI cervical spine without gadolinium, 10/21/2024- there is Grade I retrolisthesis of C5 on C6 associated with mild stenosis and right-greater-than-left neuroforaminal narrowing.  There is no significant central stenosis with CSF visualized circumferentially around the spinal cord.    MRI lumbar spine without gadolinium, 06/12/2024- subacute on chronic L1 compression fracture involving the superior endplate, which is associated with a Schmorl's node.  There is 40% loss of height with no retropulsion and no kyphosis.  At T12-L1, L1-2, and L2-3, there are varying levels of bilateral neuroforaminal  narrowing.  At L3-4, there is mild stenosis with bilateral neuroforaminal narrowing.  At L4-5, there is moderate to severe stenosis with bilateral neuroforaminal narrowing.  At L5-S1, there is mild left neuroforaminal narrowing.    DEXA scan, 06/24/2024- osteoporosis.      ASSESSMENT:  Mr. Lanza is a 79 y.o. right handed male who has a past medical history significant for coronary artery disease, peripheral vascular disease, s/p AAA repair in 2022, COPD, chronic kidney disease, BPH, and Raynaud's disease.  He presents as a follow up patient in the neurosurgery clinic for progressively worsening low back pain with bilateral lower extremity weakness in the last 6 months as well as chronic, mild neck pain.  The patient has had multiple falling episodes associated with his low back pain.  Mr. Lanza has a GCS 14 due to confusion.  The patient has a normal motor and sensory neurological exam with no signs of myelopathy.    I reviewed pertinent imaging studies with the patient.  A MRI cervical spine without gadolinium demonstrates Grade I retrolisthesis of C5 on C6 associated with mild stenosis and right-greater-than-left neuroforaminal narrowing.  There is no significant central stenosis with CSF visualized circumferentially around the spinal cord.  A MRI lumbar spine without gadolinium demonstrates a subacute on chronic L1 compression fracture involving the superior endplate, which is associated with a Schmorl's node.  There is 40% loss of height with no retropulsion and no kyphosis.  At T12-L1, L1-2, and L2-3, there are varying levels of bilateral neuroforaminal narrowing.  At L3-4, there is mild stenosis with bilateral neuroforaminal narrowing.  At L4-5, there is moderate to severe stenosis with bilateral neuroforaminal narrowing.  At L5-S1, there is mild left neuroforaminal narrowing.        PLAN:    Encounter Diagnoses   Name Primary?    Lumbar spondylosis Yes    Chronic bilateral low back pain with bilateral  sciatica     Cervical spondylosis     Chronic neck pain     Falling episodes     Poor memory      Orders Placed This Encounter   Procedures    MRI Brain Without Contrast    MRA Neck without contrast    MRA Brain        1.  I discussed with Mr. Lanza that although he has chronic neck and low back pain, continued optimization of medical management is recommended.  With his multiple medical conditions, he would be considered a relatively higher risk surgical candidate. The patient is very agreeable to this nonsurgical plan of care.    2.  It is noted that any type of elective spinal surgery in the future will require preoperative clearance from his cardiologist Dr. Kelly as well as his vascular surgeon Dr. Luna.  He would require permission for aspirin 81 mg to be discontinued 10 days preoperatively.    3.  In the meantime, Mr. Lanza is encouraged to reconsider initiating a physical therapy program at the [facility] facility adjacent to [facility].  The patient states that he is already very active walking his dog and is not particularly interested in pursuing PT.  I requested Mr. Lanza to contact the neurosurgery office if he would like to start physical therapy in the future.  He understands that without completing 6 weeks of PT, elective spine surgery will not be able to be authorized by his medical insurance plan.    4.  To further evaluate his poor memory and falling episodes, I am ordering a MRI of the brain without gadolinium as well as a MRA of the head and neck.  The patient will be contacted with these radiographic results and any updates to the plan of care.  His primary care physician Dr. Gabino Pimentel ordered a CT head without contrast, which is scheduled to be completed today.  In addition, Mr. Lanza was referred by Dr. Pimentel to neurologist Dr. Harris for further evaluation.     5.  He is recommended to follow up with his primary care physician Dr. Lloyd  Loren for optimizing medical treatment of his osteoporosis.    6.  The patient is scheduled to follow up in pain management clinic with ARNOLD Nur on 01/22/2025.  He may be a future candidate for cervical and/or lumbar epidural steroid injections.    7.  Mr. Lanza is encouraged to follow up in the neurosurgery clinic on an as-needed basis with ARNOLD Dale for any concerning changes in condition or symptoms.        This note will be sent to the patient's referring provider No ref. provider found and primary care provider Gabino Pimentel MD.           Radha Espana MD  Neurosurgeon

## 2024-12-23 NOTE — PATIENT INSTRUCTIONS
Mr. Lanza is a 79 y.o. right handed male who has a past medical history significant for coronary artery disease, peripheral vascular disease, s/p AAA repair in 2022, COPD, chronic kidney disease, BPH, and Raynaud's disease.  He presents as a follow up patient in the neurosurgery clinic for progressively worsening low back pain with bilateral lower extremity weakness in the last 6 months as well as chronic, mild neck pain.  The patient has had multiple falling episodes associated with his low back pain.  Mr. Lanza has a GCS 14 due to confusion.  The patient has a normal motor and sensory neurological exam with no signs of myelopathy.    I reviewed pertinent imaging studies with the patient.  A MRI cervical spine without gadolinium demonstrates Grade I retrolisthesis of C5 on C6 associated with mild stenosis and right-greater-than-left neuroforaminal narrowing.  There is no significant central stenosis with CSF visualized circumferentially around the spinal cord.  A MRI lumbar spine without gadolinium demonstrates a subacute on chronic L1 compression fracture involving the superior endplate, which is associated with a Schmorl's node.  There is 40% loss of height with no retropulsion and no kyphosis.  At T12-L1, L1-2, and L2-3, there are varying levels of bilateral neuroforaminal narrowing.  At L3-4, there is mild stenosis with bilateral neuroforaminal narrowing.  At L4-5, there is moderate to severe stenosis with bilateral neuroforaminal narrowing.  At L5-S1, there is mild left neuroforaminal narrowing.        PLAN:    Encounter Diagnoses   Name Primary?    Lumbar spondylosis Yes    Chronic bilateral low back pain with bilateral sciatica     Cervical spondylosis     Chronic neck pain     Falling episodes     Poor memory      Orders Placed This Encounter   Procedures    MRI Brain Without Contrast    MRA Neck without contrast    MRA Brain        1.  I discussed with Mr. Lanza that although he has chronic neck  and low back pain, continued optimization of medical management is recommended.  With his multiple medical conditions, he would be considered a relatively higher risk surgical candidate. The patient is very agreeable to this nonsurgical plan of care.    2.  It is noted that any type of elective spinal surgery in the future will require preoperative clearance from his cardiologist Dr. Kelly as well as his vascular surgeon Dr. Luna.  He would require permission for aspirin 81 mg to be discontinued 10 days preoperatively.    3.  In the meantime, Mr. Lanza is encouraged to reconsider initiating a physical therapy program at the [facility] facility adjacent to [facility].  The patient states that he is already very active walking his dog and is not particularly interested in pursuing PT.  I requested Mr. Lanza to contact the neurosurgery office if he would like to start physical therapy in the future.  He understands that without completing 6 weeks of PT, elective spine surgery will not be able to be authorized by his medical insurance plan.    4.  To further evaluate his poor memory and falling episodes, I am ordering a MRI of the brain without gadolinium as well as a MRA of the head and neck.  The patient will be contacted with these radiographic results and any updates to the plan of care.  His primary care physician Dr. Gabino Pimentel ordered a CT head without contrast, which is scheduled to be completed today.  In addition, Mr. Lanza was referred by Dr. Pimentel to neurologist Dr. Harris for further evaluation.     5.  He is recommended to follow up with his primary care physician Dr. Gabino Pimentel for optimizing medical treatment of his osteoporosis.    6.  The patient is scheduled to follow up in pain management clinic with ARNOLD Nur on 01/22/2025.  He may be a future candidate for cervical and/or lumbar epidural steroid injections.    7.  Mr. Lanza is encouraged  to follow up in the neurosurgery clinic on an as-needed basis with ARNOLD Dale for any concerning changes in condition or symptoms.        This note will be sent to the patient's referring provider No ref. provider found and primary care provider Gabino Pimentel MD.

## 2025-01-03 ENCOUNTER — HOSPITAL ENCOUNTER (OUTPATIENT)
Dept: RADIOLOGY | Facility: HOSPITAL | Age: 80
Discharge: HOME | End: 2025-01-03
Attending: NEUROLOGICAL SURGERY
Payer: MEDICARE

## 2025-01-03 DIAGNOSIS — R29.6 FALLING EPISODES: ICD-10-CM

## 2025-01-03 DIAGNOSIS — R41.3 POOR MEMORY: ICD-10-CM

## 2025-01-03 PROCEDURE — 70551 MRI BRAIN STEM W/O DYE: CPT | Mod: TC

## 2025-01-03 PROCEDURE — 70547 MR ANGIOGRAPHY NECK W/O DYE: CPT | Mod: TC

## 2025-01-03 PROCEDURE — 70544 MR ANGIOGRAPHY HEAD W/O DYE: CPT | Mod: TC

## 2025-01-08 ENCOUNTER — TELEPHONE (OUTPATIENT)
Dept: NEUROSURGERY | Facility: CLINIC | Age: 80
End: 2025-01-08
Payer: MEDICARE

## 2025-01-08 DIAGNOSIS — I65.1 BASILAR ARTERY STENOSIS: ICD-10-CM

## 2025-01-08 DIAGNOSIS — R29.6 FALLING EPISODES: Primary | ICD-10-CM

## 2025-01-08 NOTE — TELEPHONE ENCOUNTER
I tried to call the patient to get him scheduled for a virtual visit with Lashanda. He did not answer so I left a detailed message requesting a call back.

## 2025-01-08 NOTE — TELEPHONE ENCOUNTER
I am requesting ARNOLD Pyle to contact Mr. Lanza.  I reviewed the patient's CT head without contrast images that were completed on 12/30/2024.  I also reviewed his MRI brain without gadolinium, MRA of the brain, and MRA of the neck images that were completed on 01/03/2025.      A CT head without contrast demonstrates no acute abnormalities.  There is generalized atrophy.    A MRI brain without gadolinium demonstrates chronic white matter ischemic changes, which are age-appropriate.      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    A MRA of the neck demonstrates vascular stenosis in the origin of the left common carotid artery and mural plaque in the proximal left internal carotid artery with 40% stenosis.      With these updated radiographic results, my recommendations are as follows:    1.  Continued optimization of medical management is recommended for his chronic neck and low back pain.    2.  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 Lanza to neurovascular neurologist Dr. Jolley.    3.  The patient has a scheduled appointment with Neurology NP Cate Francois on 03/13/2025 for evaluation of his memory issues and falling episodes.    4.  In the meantime, Mr. Lanza is encouraged to reconsider initiating a physical therapy program at the [facility] facility adjacent to [facility].  He is advised to contact the neurosurgery office if he would like to start physical therapy in the future.  The patient understands that without completing 6 weeks of PT, elective spine surgery will not be able to be authorized by his medical insurance plan.     5.  He is recommended to follow up with his primary care physician Dr. Gabino Pimentel for optimizing medical treatment of his osteoporosis.  This condition of thin bone needs to be optimized before being deemed a candidate for any type of elective  spine surgery.     6.  The patient is scheduled to follow up in pain management clinic with ARNOLD Nur on 01/22/2025.  He may be a future candidate for cervical and/or lumbar epidural steroid injections.     7.  Mr. Lanza is encouraged to follow up in the neurosurgery clinic on an as-needed basis with RANOLD Dale for any concerning changes in condition or symptoms.        Orders Placed This Encounter   Procedures    Ambulatory referral/consult to Neurology

## 2025-01-08 NOTE — TELEPHONE ENCOUNTER
Please have the patient scheduled for a next available virtual visit to go over imaging results and Dr. Espana's recommendations.

## 2025-01-09 ENCOUNTER — TELEPHONE (OUTPATIENT)
Dept: SMOKING CESSATION | Facility: CLINIC | Age: 80
End: 2025-01-09
Payer: MEDICARE

## 2025-01-10 NOTE — TELEPHONE ENCOUNTER
The patient returned my call. Whenever I picked up the phone, nobody was saying anything so I kept saying hello. I hung up and tried to give him another call. He did not answer so I LMOM.

## 2025-01-10 NOTE — TELEPHONE ENCOUNTER
The patient returned my call. I scheduled him for his virtual with Lashanda for 1/14/25 at 9:00. He was compliant w date and time.

## 2025-01-13 DIAGNOSIS — R35.0 BENIGN PROSTATIC HYPERPLASIA WITH URINARY FREQUENCY: Chronic | ICD-10-CM

## 2025-01-13 DIAGNOSIS — N40.1 BENIGN PROSTATIC HYPERPLASIA WITH URINARY FREQUENCY: Chronic | ICD-10-CM

## 2025-01-13 DIAGNOSIS — G31.84 MILD COGNITIVE IMPAIRMENT: ICD-10-CM

## 2025-01-13 RX ORDER — DONEPEZIL HYDROCHLORIDE 5 MG/1
5 TABLET, FILM COATED ORAL NIGHTLY
Qty: 90 TABLET | Refills: 3 | Status: SHIPPED | OUTPATIENT
Start: 2025-01-13 | End: 2026-01-13

## 2025-01-13 RX ORDER — FINASTERIDE 5 MG/1
5 TABLET, FILM COATED ORAL DAILY
Qty: 90 TABLET | Refills: 3 | Status: SHIPPED | OUTPATIENT
Start: 2025-01-13 | End: 2026-01-13

## 2025-01-14 ENCOUNTER — OFFICE VISIT (OUTPATIENT)
Dept: NEUROSURGERY | Facility: CLINIC | Age: 80
End: 2025-01-14
Payer: MEDICARE

## 2025-01-14 VITALS — BODY MASS INDEX: 20.14 KG/M2 | HEIGHT: 66 IN

## 2025-01-14 DIAGNOSIS — G89.29 CHRONIC BILATERAL LOW BACK PAIN WITH BILATERAL SCIATICA: ICD-10-CM

## 2025-01-14 DIAGNOSIS — I65.1 BASILAR ARTERY STENOSIS: Primary | ICD-10-CM

## 2025-01-14 DIAGNOSIS — M54.41 CHRONIC BILATERAL LOW BACK PAIN WITH BILATERAL SCIATICA: ICD-10-CM

## 2025-01-14 DIAGNOSIS — M54.42 CHRONIC BILATERAL LOW BACK PAIN WITH BILATERAL SCIATICA: ICD-10-CM

## 2025-01-14 RX ORDER — LEVOCETIRIZINE DIHYDROCHLORIDE 5 MG/1
1 TABLET, FILM COATED ORAL EVERY EVENING
COMMUNITY
Start: 2025-01-07 | End: 2025-01-17

## 2025-01-14 RX ORDER — FLUTICASONE PROPIONATE 50 MCG
1 SPRAY, SUSPENSION (ML) NASAL
COMMUNITY
Start: 2025-01-07 | End: 2025-01-17

## 2025-01-14 RX ORDER — MULTIVITAMIN
1 TABLET ORAL DAILY
COMMUNITY

## 2025-01-14 NOTE — PROGRESS NOTES
[facility]   Neurosurgery      Gustavo Lanza  MRN: [id number], CSN: [id number]      : 1945   Age: 79 y.o. male  Payor: [employer] MEDICARE / Plan: [employer] MEDICARE HMO / Product Type: Capitation /       Ref:  No referring provider defined for this encounter.    PCP: Gabino Pimentel MD    Visit Date: 2025     Patient Active Problem List   Diagnosis    Stage 3a chronic kidney disease    S/P AAA repair    COPD (chronic obstructive pulmonary disease)    Coronary artery disease involving native coronary artery of native heart without angina pectoris    Mixed hyperlipidemia    Raynaud disease    Chronic idiopathic constipation    Impaired fasting glucose    BPH (benign prostatic hyperplasia)    Retrograde ejaculation    Peripheral vascular disease, unspecified    Prediabetes    Microalbuminuria    PSA elevation    Aneurysm of descending thoracic aorta without rupture    Insomnia    Leg weakness, bilateral    Spinal stenosis of lumbar region with neurogenic claudication    Neural foraminal stenosis of lumbar spine    Complicated UTI (urinary tract infection)    Mild cognitive impairment       The patient location is: his home in [place], [place]  The chief complaint leading to consultation is: Recent imaging results with future plan of care dissucssion    Visit type: audio only    Face to Face time with patient: 0  15 minutes of total time spent on the encounter, which includes face to face time and non-face to face time preparing to see the patient (eg, review of tests), Obtaining and/or reviewing separately obtained history, Documenting clinical information in the electronic or other health record, Independently interpreting results (not separately reported) and communicating results to the patient/family/caregiver, or Care coordination (not separately reported).       Each patient to whom he or she provides medical services by telemedicine is:  (1) informed of the  relationship between the physician and patient and the respective role of any other health care provider with respect to management of the patient; and (2) notified that he or she may decline to receive medical services by telemedicine and may withdraw from such care at any time.    Notes:        SUBJECTIVE:      CC:   No chief complaint on file.      HPI:   Mr. Lanza is a 79 y.o. right handed male who has a past medical history significant for coronary artery disease, peripheral vascular disease, s/p AAA repair in 2022, COPD, chronic kidney disease, BPH, and Raynaud's disease.  He presents as a follow up patient in the neurosurgery clinic for progressively worsening low back pain with bilateral lower extremity weakness in the last 6 months as well as chronic, mild neck pain.  The patient has had multiple falling episodes associated with his low back pain.    The patient's last date of visit in the neurosurgery clinic was on 12/23/2024 with Dr. Espana. The plan of care was to complete MRI brain with and without contrast as well as MRA of head and neck. He has also completed a CT head without contrast that was ordered by his PCP Dr. Km Pimentel. These radiographic studies were completed with imaging results to be reviewed during today's appointment. Thought Mr. Lanza was referred to physical therapy and also pain management specialist Dr. Diana, he has deferred following up with PT to another time.  In addition, he has seen been started on Neurontin by pain management NP Millie Nur.    Mr. Lanza reports injuring his back while serving in the [employer] and likely sustained a L1 compression fracture.  He has been highly functional until the last 6-9 months, during which time his lower back has been rated a 10/10 pain level.  His symptoms were not associated with any traumatic events.  The patient describes having left-sided low back pain that has become bilateral pain across his lower back.  With  prolonged standing, Mr. Lanza has a sensation of tingling in his lower back, radiating leg pain for 3-5 minutes, followed by his bilateral legs giving out.  He has had multiple falls in the last 6 months.  His last fall was yesterday at [facility] and the patient often falls when getting out of a chair.  These falling episodes are not associated with any loss of consciousness.  Mr. Lanza states that after falling, his pain level and overall symptoms improve.    The patient also mentions having mild neck pain without any radiating arm pain.  When standing, he has intermittent numbness in his bilateral anterior upper legs radiating down into his lateral lower legs.  Mr. Lanza does not have any focal weakness, saddle anesthesia, or bowel/ bladder incontinence.  He is fully ambulatory.    Mr. Lanza has tried taking Tylenol and Neurontin.  The patient has not initiated physical therapy because he feels that his activity level is already very good with walking his dog on a regular basis.  Although he is established with pain management specialist Dr. Diana, the patient has not completed any epidural steroid injections in his cervical or lumbar spine.  Mr. Lanza is currently enrolled in a smoking cessation program and stopped smoking cigarettes 3 months ago.  He has been wearing a nicotine patch.    The patient's established cardiologist is Dr. Kelly.  His vascular surgeon is Dr. Luna.       Patient Active Problem List    Diagnosis Date Noted    Mild cognitive impairment 12/23/2024    Complicated UTI (urinary tract infection) 08/26/2024    Spinal stenosis of lumbar region with neurogenic claudication 06/20/2024    Neural foraminal stenosis of lumbar spine 06/20/2024    Insomnia 03/26/2024    Leg weakness, bilateral 03/26/2024    Aneurysm of descending thoracic aorta without rupture 06/29/2023    Peripheral vascular disease, unspecified 06/06/2023    Prediabetes 06/06/2023    Microalbuminuria 06/06/2023     PSA elevation 06/06/2023    Retrograde ejaculation 06/01/2023    Stage 3a chronic kidney disease 08/03/2022    S/P AAA repair 08/03/2022    COPD (chronic obstructive pulmonary disease) 08/03/2022    Coronary artery disease involving native coronary artery of native heart without angina pectoris 08/03/2022    Mixed hyperlipidemia 08/03/2022    Raynaud disease 08/03/2022    Chronic idiopathic constipation 08/03/2022    Impaired fasting glucose 08/03/2022    BPH (benign prostatic hyperplasia) 08/03/2022     Past Medical History:   Diagnosis Date    AAA (abdominal aortic aneurysm)     Abdominal aortic aneurysm, without rupture, unspecified     Acute bilateral low back pain without sciatica     Aneurysm of descending thoracic aorta without rupture     Atherosclerosis of arteries of extremities     Benign prostatic hyperplasia without lower urinary tract symptoms     BPH (benign prostatic hyperplasia) 08/03/2022    Carotid artery stenosis     Chronic idiopathic constipation 08/03/2022    Compression fracture of L1 vertebra, initial encounter     COPD (chronic obstructive pulmonary disease)     Coronary artery disease involving native coronary artery of native heart without angina pectoris 08/03/2022    Disorder of kidney and ureter     Hyperlipidemia, unspecified     Impaired fasting glucose 08/03/2022    Leg weakness, bilateral     Mixed hyperlipidemia 08/03/2022    Neural foraminal stenosis of lumbar spine     Nonrheumatic mitral (valve) stenosis     Occlusion and stenosis of bilateral carotid arteries     Peripheral vascular disease, unspecified     Presence of other vascular implants and grafts     Primary insomnia     Raynaud disease 08/03/2022    S/P AAA repair 08/03/2022    Solitary pulmonary nodule     Spinal stenosis of lumbar region with neurogenic claudication     Spinal stenosis, lumbosacral region     Stage 3a chronic kidney disease      Past Surgical History:   Procedure Laterality Date    ABDOMINAL AORTIC  ANEURYSM REPAIR  03/13/2020    Surgeon: Dr. Ovalles    AORTOGRAM W/ BLE RUNOFF Bilateral 05/30/2024    Mg Cullen MD    CARDIAC SURGERY  01/30/2014    CABG    COLONOSCOPY  11/12/2021    CARLOS MOHAMUD MD    HERNIA REPAIR Left 03/23/2016    PERIPHERAL ANGIOGRAPHY  12/18/2015    Femoral Popliteal revas w/stent    REPAIR OF HEART VALVE         Current Outpatient Medications:     albuterol (PROVENTIL/VENTOLIN HFA) 90 mcg/actuation inhaler, Inhale 2 puffs into the lungs every 6 (six) hours as needed for Wheezing or Shortness of Breath., Disp: 18 g, Rfl: 5    albuterol-ipratropium (DUO-NEB) 2.5 mg-0.5 mg/3 mL nebulizer solution, Take 3 mLs by nebulization every 6 (six) hours as needed for Wheezing. Rescue, Disp: 75 mL, Rfl: 2    aspirin (ECOTRIN) 81 MG EC tablet, Take 81 mg by mouth once daily., Disp: , Rfl:     carvediloL (COREG) 6.25 MG tablet, Take 0.5 tablets (3.125 mg total) by mouth 2 (two) times daily., Disp: 90 tablet, Rfl: 1    donepeziL (ARICEPT) 5 MG tablet, Take 1 tablet (5 mg total) by mouth every evening., Disp: 90 tablet, Rfl: 3    eszopiclone (LUNESTA) 2 MG Tab, Take 2 mg by mouth every evening., Disp: , Rfl:     finasteride (PROSCAR) 5 mg tablet, Take 1 tablet (5 mg total) by mouth once daily., Disp: 90 tablet, Rfl: 3    fluticasone propionate (FLONASE) 50 mcg/actuation nasal spray, 1 spray by Nasal route., Disp: , Rfl:     levocetirizine (XYZAL) 5 MG tablet, Take 1 tablet by mouth once daily., Disp: , Rfl:     multivitamin (THERAGRAN) per tablet, Take 1 tablet by mouth once daily., Disp: , Rfl:     nicotine (NICODERM CQ) 7 mg/24 hr, Place 1 patch onto the skin once daily., Disp: 28 patch, Rfl: 0    nicotine polacrilex 2 MG Lozg, Take 1 lozenge (2 mg total) by mouth as needed (Do not exceed more than 10 pieces in 24 hours)., Disp: 144 lozenge, Rfl: 0    rosuvastatin (CRESTOR) 10 MG tablet, , Disp: , Rfl:     TRUE METRIX GLUCOSE TEST STRIP Strp, TEST BLOOD SUGAR EVERY DAY, Disp: 100 strip, Rfl: 3    TRUEPLUS  "LANCETS 33 gauge Misc, TEST BLOOD SUGAR EVERY DAY, Disp: 100 each, Rfl: 3    UNABLE TO FIND, MEDICAL MARIJUANA, Disp: , Rfl:     UNABLE TO FIND, Suppositories for constipation, Disp: , Rfl:     Review of patient's allergies indicates:   Allergen Reactions    Nitrofurantoin monohyd/m-cryst        Social History     Tobacco Use    Smoking status: Former     Current packs/day: 0.00     Average packs/day: 0.9 packs/day for 58.5 years (54.6 ttl pk-yrs)     Types: Cigarettes     Start date:      Quit date: 10/4/2024     Years since quittin.2     Passive exposure: Past    Smokeless tobacco: Never    Tobacco comments:     Quit 10/4/24   Substance Use Topics    Alcohol use: Never     Occupation: Retired in , 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    The patient served in the [employer] and reports being dishonorably discharged.    Family History   Problem Relation Name Age of Onset    No Known Problems Mother      No Known Problems Father      No Known Problems Sister      No Known Problems Brother         ROS:  Constitutional:  Negative for chills and fever.   HENT:  Negative for congestion and sore throat.    Eyes:  Negative for blurred vision and double vision.   Respiratory:  Positive for shortness of breath, Negative for cough.  Cardiovascular:  Negative for chest pain and palpitations.   Gastrointestinal:  Positive for constipation, Negative for diarrhea, nausea and vomiting.   Musculoskeletal:  Positive for back pain and neck pain.   Neurological:  Positive for focal weakness and sensory change.  Negative for headaches.   Endo/Heme/Allergies:  Does not bruise/bleed easily.   Psychiatric/Behavioral:  Negative for depression and anxiety.      OBJECTIVE:     EXAMINATION:  Ht 5' 6" (1.676 m)   BMI 20.14 kg/m²   Body Habitus: Thin      DIAGNOSTICS REVIEW OF IMAGING, LAB & OTHER STUDIES:  I have personally reviewed and evaluated the following reports as well as " radiographic studies that were completed on 01/03/2025:    A CT head without contrast demonstrates no acute abnormalities.  There is generalized atrophy.     A MRI brain without gadolinium demonstrates chronic white matter ischemic changes, which are age-appropriate.       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     A MRA of the neck demonstrates vascular stenosis in the origin of the left common carotid artery and mural plaque in the proximal left internal carotid artery with 40% stenosis.      ASSESSMENT:  Mr. Lanza is a 79 y.o. right handed male who has a past medical history significant for coronary artery disease, peripheral vascular disease, s/p AAA repair in 2022, COPD, chronic kidney disease, BPH, and Raynaud's disease.  He presents as a follow up patient in the neurosurgery clinic for progressively worsening low back pain with bilateral lower extremity weakness in the last 6 months as well as chronic, mild neck pain.  The patient has had multiple falling episodes associated with his low back pain.  Mr. Lanza has a GCS 14 due to confusion.  The patient has a normal motor and sensory neurological exam with no signs of myelopathy.    I reviewed pertinent imaging studies with the patient.  A CT head without contrast demonstrates no acute abnormalities.  There is generalized atrophy.     A MRI brain without gadolinium demonstrates chronic white matter ischemic changes, which are age-appropriate.       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     A MRA of the neck demonstrates vascular stenosis in the origin of the left common carotid artery and mural plaque in the proximal left internal carotid artery with 40% stenosis.    PLAN:    Encounter Diagnoses   Name Primary?    Basilar artery stenosis Yes    Chronic bilateral low back pain with bilateral sciatica        No orders of the defined types were placed in this encounter.       1.  Continued  optimization of medical management is recommended for his chronic neck and low back pain.     2.  Due to the 70% stenosis in the basilar artery that may be related to some of his falling episodes,  Mr. Lanza has been referred to neurovascular neurologist Dr. Jolley.     3.  The patient has a scheduled appointment with Neurology NP Cate Francois on 03/13/2025 for evaluation of his memory issues and falling episodes.     4.  In the meantime, Mr. Lanza is encouraged to reconsider initiating a physical therapy program at the [facility] facility adjacent to [facility].  He is advised to contact the neurosurgery office if he would like to start physical therapy in the future.  The patient understands that without completing 6 weeks of PT, elective spine surgery will not be able to be authorized by his medical insurance plan.     5.  He is recommended to follow up with his primary care physician Dr. Gabino Pimentel for optimizing medical treatment of his osteoporosis.  This condition of thin bone needs to be optimized before being deemed a candidate for any type of elective spine surgery.     6.  The patient is scheduled to follow up in pain management clinic with ARNOLD Nur on 01/22/2025.  He may be a future candidate for cervical and/or lumbar epidural steroid injections.     7.  Mr. Lanza is encouraged to follow up in the neurosurgery clinic on an as-needed basis with ARNOLD Dale for any concerning changes in condition or symptoms.        This note will be sent to the patient's referring provider No ref. provider found and primary care provider Gabino Pimentel MD.           Lashanda Shaw, FNP-C  Neurosurgery

## 2025-01-16 ENCOUNTER — TELEPHONE (OUTPATIENT)
Dept: SMOKING CESSATION | Facility: CLINIC | Age: 80
End: 2025-01-16
Payer: MEDICARE

## 2025-01-16 ENCOUNTER — CLINICAL SUPPORT (OUTPATIENT)
Dept: SMOKING CESSATION | Facility: CLINIC | Age: 80
End: 2025-01-16
Payer: COMMERCIAL

## 2025-01-16 DIAGNOSIS — F17.200 NICOTINE DEPENDENCE: Primary | ICD-10-CM

## 2025-01-16 PROCEDURE — 99402 PREV MED CNSL INDIV APPRX 30: CPT | Mod: S$GLB,,,

## 2025-01-16 RX ORDER — DM/P-EPHED/ACETAMINOPH/DOXYLAM 30-7.5/3
2 LIQUID (ML) ORAL
Qty: 144 LOZENGE | Refills: 0 | Status: SHIPPED | OUTPATIENT
Start: 2025-01-16

## 2025-01-16 RX ORDER — NICOTINE 7MG/24HR
1 PATCH, TRANSDERMAL 24 HOURS TRANSDERMAL DAILY
Qty: 28 PATCH | Refills: 0 | Status: SHIPPED | OUTPATIENT
Start: 2025-01-16

## 2025-01-16 NOTE — TELEPHONE ENCOUNTER
Called pt regarding smoking cessation phone follow up appointment.  No answer.  Left voice message with contact information.
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
## 2025-02-03 ENCOUNTER — TELEPHONE (OUTPATIENT)
Facility: CLINIC | Age: 80
End: 2025-02-03
Payer: MEDICARE

## 2025-02-03 NOTE — TELEPHONE ENCOUNTER
----- Message from Med Assistant Frances sent at 12/12/2024 10:37 AM CST -----  Regarding: RE: PT  Spoke w/[facility]  States pt had an eval   Was a no show for 1st appointment when they reached out to him to reschedule pt stated he did not want to reschedule and to cancel all future appointments  ----- Message -----  From: Jessenia Garcia MA  Sent: 11/28/2024  12:00 AM CST  To: Liudmila MAYER Staff  Subject: PT                                               PT at [facility] did pt begin

## 2025-02-04 ENCOUNTER — LAB VISIT (OUTPATIENT)
Dept: LAB | Facility: HOSPITAL | Age: 80
End: 2025-02-04
Attending: STUDENT IN AN ORGANIZED HEALTH CARE EDUCATION/TRAINING PROGRAM
Payer: MEDICARE

## 2025-02-04 DIAGNOSIS — R41.3 OTHER AMNESIA: ICD-10-CM

## 2025-02-04 DIAGNOSIS — G31.84 MILD COGNITIVE IMPAIRMENT: ICD-10-CM

## 2025-02-04 LAB
FOLATE SERPL-MCNC: 35 NG/ML (ref 7–31.4)
T PALLIDUM AB SER QL: NONREACTIVE
TSH SERPL-ACNC: 2.33 UIU/ML (ref 0.35–4.94)
VIT B12 SERPL-MCNC: 739 PG/ML (ref 213–816)

## 2025-02-04 PROCEDURE — 86780 TREPONEMA PALLIDUM: CPT

## 2025-02-04 PROCEDURE — 82746 ASSAY OF FOLIC ACID SERUM: CPT

## 2025-02-04 PROCEDURE — 84443 ASSAY THYROID STIM HORMONE: CPT

## 2025-02-04 PROCEDURE — 82607 VITAMIN B-12: CPT

## 2025-02-04 PROCEDURE — 36415 COLL VENOUS BLD VENIPUNCTURE: CPT

## 2025-02-06 ENCOUNTER — OFFICE VISIT (OUTPATIENT)
Dept: PRIMARY CARE CLINIC | Facility: CLINIC | Age: 80
End: 2025-02-06
Payer: MEDICARE

## 2025-02-06 VITALS
TEMPERATURE: 98 F | HEART RATE: 77 BPM | SYSTOLIC BLOOD PRESSURE: 107 MMHG | DIASTOLIC BLOOD PRESSURE: 67 MMHG | RESPIRATION RATE: 15 BRPM | BODY MASS INDEX: 19.29 KG/M2 | OXYGEN SATURATION: 96 % | HEIGHT: 66 IN | WEIGHT: 120 LBS

## 2025-02-06 DIAGNOSIS — R73.03 PREDIABETES: ICD-10-CM

## 2025-02-06 DIAGNOSIS — Z87.891 FORMER SMOKER: ICD-10-CM

## 2025-02-06 DIAGNOSIS — J44.9 CHRONIC OBSTRUCTIVE PULMONARY DISEASE, UNSPECIFIED COPD TYPE: ICD-10-CM

## 2025-02-06 DIAGNOSIS — M81.0 AGE-RELATED OSTEOPOROSIS WITHOUT CURRENT PATHOLOGICAL FRACTURE: ICD-10-CM

## 2025-02-06 DIAGNOSIS — G31.84 MILD COGNITIVE IMPAIRMENT: ICD-10-CM

## 2025-02-06 DIAGNOSIS — Z00.00 MEDICARE ANNUAL WELLNESS VISIT, SUBSEQUENT: Primary | ICD-10-CM

## 2025-02-06 DIAGNOSIS — N18.2 STAGE 2 CHRONIC KIDNEY DISEASE: ICD-10-CM

## 2025-02-06 LAB — HBA1C MFR BLD: 6.2 %

## 2025-02-06 RX ORDER — ALENDRONATE SODIUM 70 MG/1
70 TABLET ORAL
Qty: 12 TABLET | Refills: 3 | Status: SHIPPED | OUTPATIENT
Start: 2025-02-06 | End: 2026-02-06

## 2025-02-06 RX ORDER — DONEPEZIL HYDROCHLORIDE 10 MG/1
10 TABLET, FILM COATED ORAL NIGHTLY
Qty: 90 TABLET | Refills: 3 | Status: SHIPPED | OUTPATIENT
Start: 2025-02-06 | End: 2026-02-06

## 2025-02-06 NOTE — ASSESSMENT & PLAN NOTE
Reviewed recent wellness labs. See below for health maintenance.    Vaccinations:   - Flu: pt thinks he received at pharmacy but is unsure   - Pneumonia: PCV13 2016, PPSV23 2018   - Shingles (>50): recommended he receive at pharmacy   - Tdap: declines   - COVID: received x 4   - RSV: recommended he receive at pharmacy  Screening:   - Prostate (>45): follows Urology   - Lung Ca. Screening (50-80 with >20 pack yrs current or quit <15 yrs): order placed, participated in shared decision making   - Colon Ca. Screening (>45): n/a sec to age   - AAA (65-75 if ever smoked): n/a sec to age (also follows vascular and cards)   - Cardiac: follows cardiology    - Hep. C: done 2021

## 2025-02-06 NOTE — PROGRESS NOTES
Patient ID: [id number]     Chief Complaint: Medicare AWV    HPI:     Gustavo Lanza is a 79 y.o. male here today for a Medicare Wellness.     Opioid Screening: Patient medication list reviewed, patient is not taking prescription opioids. Patient is not using additional opioids than prescribed. Patient is at low risk of substance abuse based on this opioid use history.       -------------------------------------    AAA (abdominal aortic aneurysm)    Abdominal aortic aneurysm, without rupture, unspecified    Acute bilateral low back pain without sciatica    Aneurysm of descending thoracic aorta without rupture    Atherosclerosis of arteries of extremities    Benign prostatic hyperplasia without lower urinary tract symptoms    BPH (benign prostatic hyperplasia)    Carotid artery stenosis    Chronic idiopathic constipation    Compression fracture of L1 vertebra, initial encounter    COPD (chronic obstructive pulmonary disease)    Coronary artery disease involving native coronary artery of native heart without angina pectoris    Disorder of kidney and ureter    Hyperlipidemia, unspecified    Impaired fasting glucose    Leg weakness, bilateral    Mixed hyperlipidemia    Neural foraminal stenosis of lumbar spine    Nonrheumatic mitral (valve) stenosis    Occlusion and stenosis of bilateral carotid arteries    Peripheral vascular disease, unspecified    Presence of other vascular implants and grafts    Primary insomnia    Raynaud disease    S/P AAA repair    Solitary pulmonary nodule    Spinal stenosis of lumbar region with neurogenic claudication    Spinal stenosis, lumbosacral region    Stage 3a chronic kidney disease        Past Surgical History:   Procedure Laterality Date    ABDOMINAL AORTIC ANEURYSM REPAIR  03/13/2020    Surgeon: Dr. Ovalles    AORTOGRAM W/ BLE RUNOFF Bilateral 05/30/2024    Mg Cullen MD    CARDIAC SURGERY  01/30/2014    CABG    COLONOSCOPY  11/12/2021    CARLOS MOHAMUD MD    HERNIA REPAIR Left  03/23/2016    PERIPHERAL ANGIOGRAPHY  12/18/2015    Femoral Popliteal revas w/stent    REPAIR OF HEART VALVE         Review of patient's allergies indicates:   Allergen Reactions    Nitrofurantoin monohyd/m-cryst        Outpatient Medications Marked as Taking for the 2/6/25 encounter (Office Visit) with Gabino Pimentel MD   Medication Sig Dispense Refill    albuterol (PROVENTIL/VENTOLIN HFA) 90 mcg/actuation inhaler Inhale 2 puffs into the lungs every 6 (six) hours as needed for Wheezing or Shortness of Breath. 18 g 5    aspirin (ECOTRIN) 81 MG EC tablet Take 81 mg by mouth once daily.      carvediloL (COREG) 6.25 MG tablet Take 0.5 tablets (3.125 mg total) by mouth 2 (two) times daily. 90 tablet 1    eszopiclone (LUNESTA) 2 MG Tab Take 2 mg by mouth every evening. (Patient not taking: Reported on 2/11/2025)      finasteride (PROSCAR) 5 mg tablet Take 1 tablet (5 mg total) by mouth once daily. (Patient not taking: Reported on 2/11/2025) 90 tablet 3    multivitamin (THERAGRAN) per tablet Take 1 tablet by mouth once daily.      rosuvastatin (CRESTOR) 10 MG tablet       TRUE METRIX GLUCOSE TEST STRIP Strp TEST BLOOD SUGAR EVERY  strip 3    TRUEPLUS LANCETS 33 gauge Misc TEST BLOOD SUGAR EVERY  each 3    UNABLE TO FIND Suppositories for constipation      [DISCONTINUED] donepeziL (ARICEPT) 5 MG tablet Take 1 tablet (5 mg total) by mouth every evening. 90 tablet 3    [DISCONTINUED] nicotine (NICODERM CQ) 7 mg/24 hr Place 1 patch onto the skin once daily. 28 patch 0    [DISCONTINUED] nicotine polacrilex 2 MG Lozg Take 1 lozenge (2 mg total) by mouth as needed (Do not exceed more than 10 pieces in 24 hours). 144 lozenge 0       Social History     Socioeconomic History    Marital status:    Tobacco Use    Smoking status: Former     Current packs/day: 0.00     Average packs/day: 0.9 packs/day for 58.5 years (54.6 ttl pk-yrs)     Types: Cigarettes     Start date: 1966     Quit date: 10/4/2024      Years since quittin.4     Passive exposure: Past    Smokeless tobacco: Never    Tobacco comments:     Quit 10/4/24   Substance and Sexual Activity    Alcohol use: Never    Drug use: Never    Sexual activity: Yes     Social Drivers of Health     Financial Resource Strain: Low Risk  (2024)    Overall Financial Resource Strain (CARDIA)     Difficulty of Paying Living Expenses: Not hard at all   Food Insecurity: No Food Insecurity (2024)    Hunger Vital Sign     Worried About Running Out of Food in the Last Year: Never true     Ran Out of Food in the Last Year: Never true   Transportation Needs: No Transportation Needs (2024)    TRANSPORTATION NEEDS     Transportation : No   Physical Activity: Insufficiently Active (2024)    Exercise Vital Sign     Days of Exercise per Week: 3 days     Minutes of Exercise per Session: 30 min   Stress: Stress Concern Present (2024)    [place] [employer] - Occupational Stress Questionnaire     Feeling of Stress : Rather much   Housing Stability: Unknown (2024)    Housing Stability Vital Sign     Unable to Pay for Housing in the Last Year: No     Homeless in the Last Year: No        Family History   Problem Relation Name Age of Onset    No Known Problems Mother      No Known Problems Father      No Known Problems Sister      No Known Problems Brother          Patient Care Team:  Gabino Pimentel MD as PCP - General (Internal Medicine)  Jagdish Kelly MD as Consulting Physician (Cardiology)  Gabriela Luna MD as Consulting Physician (Vascular Surgery)  Randall Dewey MD as Consulting Physician (Gastroenterology)       Subjective:     Review of Systems   Constitutional:  Negative for chills and fever.   HENT:  Negative for sinus pressure/congestion and sore throat.    Respiratory:  Negative for cough, shortness of breath and wheezing.    Cardiovascular:  Negative for chest pain and leg swelling.   Gastrointestinal:  Negative  for abdominal pain, nausea and vomiting.   Genitourinary:  Negative for dysuria and hematuria.   Musculoskeletal:  Negative for arthralgias and myalgias.   Integumentary:  Negative for rash.   Neurological:  Positive for weakness (legs, chronic). Negative for dizziness and syncope.   Hematological:  Negative for adenopathy.   Psychiatric/Behavioral:  Negative for confusion. The patient is not nervous/anxious.      Patient Reported Health Risk Assessment  What is your age?: 70-79  Are you male or female?: Male  During the past four weeks, how much have you been bothered by emotional problems such as feeling anxious, depressed, irritable, sad, or downhearted and blue?: Not at all  During the past five weeks, has your physical and/or emotional health limited your social activities with family, friends, neighbors, or groups?: Not at all  During the past four weeks, how much bodily pain have you generally had?: Mild pain  During the past four weeks, was someone available to help if you needed and wanted help?: Yes, as much as I wanted  During the past four weeks, what was the hardest physical activity you could do for at least two minutes?: Light  Can you get to places out of walking distance without help?  (For example, can you travel alone on buses or taxis, or drive your own car?): Yes  Can you go shopping for groceries or clothes without someone's help?: Yes  Can you prepare your own meals?: Yes  Can you do your own housework without help?: Yes  Because of any health problems, do you need the help of another person with your personal care needs such as eating, bathing, dressing, or getting around the house?: No  Can you handle your own money without help?: Yes  During the past four weeks, how would you rate your health in general?: Good  How have things been going for you during the past four weeks?: Pretty well  Are you having difficulties driving your car?: No  Do you always fasten your seat belt when you are in a  "car?: Yes, usually  How often in the past four weeks have you been bothered by falling or dizzy when standing up?: Sometimes  How often in the past four weeks have you been bothered by sexual problems?: Never  How often in the past four weeks have you been bothered by trouble eating well?: Never  How often in the past four weeks have you been bothered by teeth or denture problems?: Never  How often in the past four weeks have you been bothered with problems using the telephone?: Never  How often in the past four weeks have you been bothered by tiredness or fatigue?: Never  Have you fallen two or more times in the past year?: Yes  Are you afraid of falling?: No  Are you a smoker?: No  During the past four weeks, how many drinks of wine, beer, or other alcoholic beverages did you have?: No alcohol at all  Do you exercise for about 20 minutes three or more days a week?: Yes, some of the time  Have you been given any information to help you with hazards in your house that might hurt you?: Yes  Have you been given any information to help you with keeping track of your medications?: Yes  How often do you have trouble taking medicines the way you've been told to take them?: Sometimes I take them as prescribed  How confident are you that you can control and manage most of your health problems?: Very confident  What is your race? (Check all that apply.):     Objective:     /67 (BP Location: Right arm, Patient Position: Sitting)   Pulse 77   Temp 98.1 °F (36.7 °C)   Resp 15   Ht 5' 6" (1.676 m)   Wt 54.4 kg (120 lb)   SpO2 96%   BMI 19.37 kg/m²     Physical Exam  Vitals and nursing note reviewed.   Constitutional:       General: He is not in acute distress.  HENT:      Head: Normocephalic.      Nose: No rhinorrhea.   Eyes:      Conjunctiva/sclera: Conjunctivae normal.      Pupils: Pupils are equal, round, and reactive to light.   Cardiovascular:      Rate and Rhythm: Normal rate and regular rhythm.      " Heart sounds: Murmur (systolic) heard.   Pulmonary:      Effort: Pulmonary effort is normal.      Comments: BS decreased throughout  Abdominal:      Palpations: Abdomen is soft.      Tenderness: There is no abdominal tenderness.   Musculoskeletal:         General: No deformity or signs of injury.   Skin:     General: Skin is warm and dry.   Neurological:      General: No focal deficit present.      Mental Status: He is alert. Mental status is at baseline.   Psychiatric:         Mood and Affect: Mood normal.         Behavior: Behavior normal.                No data to display                  2/6/2025     2:00 PM 1/14/2025     9:00 AM 12/23/2024    10:00 AM 12/23/2024     8:00 AM 11/13/2024     9:00 AM 10/17/2024     2:00 PM 10/1/2024     3:20 PM   Fall Risk Assessment - Outpatient   Mobility Status Ambulatory  Ambulatory Ambulatory Ambulatory Ambulatory Ambulatory   Number of falls 0 0 0 0 2+ 2+ 2+   Identified as fall risk False  False False True True True           Depression Screening  Over the past two weeks, has the patient felt down, depressed, or hopeless?: No  Over the past two weeks, has the patient felt little interest or pleasure in doing things?: No  Assessment/Plan:     1. Medicare annual wellness visit, subsequent  Assessment & Plan:  Reviewed recent wellness labs. See below for health maintenance.    Vaccinations:   - Flu: pt thinks he received at pharmacy but is unsure   - Pneumonia: PCV13 2016, PPSV23 2018   - Shingles (>50): recommended he receive at pharmacy   - Tdap: declines   - COVID: received x 4   - RSV: recommended he receive at pharmacy  Screening:   - Prostate (>45): follows Urology   - Lung Ca. Screening (50-80 with >20 pack yrs current or quit <15 yrs): order placed, participated in shared decision making   - Colon Ca. Screening (>45): n/a sec to age   - AAA (65-75 if ever smoked): n/a sec to age (also follows vascular and cards)   - Cardiac: follows cardiology    - Hep. C: done  2021      2. Prediabetes  -     POCT HEMOGLOBIN A1C    3. Age-related osteoporosis without current pathological fracture  -     alendronate (FOSAMAX) 70 MG tablet; Take 1 tablet (70 mg total) by mouth every 7 days. Must be taken with 6-8 oz of water. Do not eat or take other medications for 30 minutes. Do not lay down for 30 minutes after taking. (Patient not taking: Reported on 2/11/2025)  Dispense: 12 tablet; Refill: 3    4. Former smoker  -     CT Chest Lung Screening Low Dose; Future; Expected date: 02/06/2025    5. Mild cognitive impairment  -     donepeziL (ARICEPT) 10 MG tablet; Take 1 tablet (10 mg total) by mouth every evening.  Dispense: 90 tablet; Refill: 3    6. Stage 2 chronic kidney disease  Assessment & Plan:  Renal indices recently improved some  Continue renal protective measures especially regarding medications  Encouraged hydration      7. Chronic obstructive pulmonary disease, unspecified COPD type  Assessment & Plan:  Symptomatically stable  Continue albuterol PRN             Medicare Annual Wellness and Personalized Prevention Plan:   Fall Risk + Home Safety + Hearing Impairment + Depression Screen + Opioid and Substance Abuse Screening + Cognitive Impairment Screen + Health Risk Assessment all reviewed.     Health Maintenance Topics with due status: Not Due       Topic Last Completion Date    Lipid Panel 06/10/2024    Aspirin/Antiplatelet Therapy 02/06/2025    Hemoglobin A1c (Prediabetes) 02/06/2025    LDCT Lung Screen 02/14/2025      The patient's Health Maintenance was reviewed and the following appears to be due at this time:   Health Maintenance Due   Topic Date Due    TETANUS VACCINE  Never done    Shingles Vaccine (1 of 2) Never done    RSV Vaccine (Age 60+ and Pregnant patients) (1 - 1-dose 75+ series) Never done       Advance Care Planning   I attest to discussing Advance Care Planning with patient and/or family member.  Education was provided including the importance of the Health  Care Power of , Advance Directives, and/or [place] documentation.  The patient expressed understanding to the importance of this information and discussion.         Follow up in about 6 months (around 8/6/2025) for Memory, Osteoporosis (Labs Before). In addition to their scheduled follow up, the patient has also been instructed to follow up on as needed basis.

## 2025-02-11 ENCOUNTER — CLINICAL SUPPORT (OUTPATIENT)
Dept: SMOKING CESSATION | Facility: CLINIC | Age: 80
End: 2025-02-11
Payer: COMMERCIAL

## 2025-02-11 DIAGNOSIS — F17.200 NICOTINE DEPENDENCE: Primary | ICD-10-CM

## 2025-02-11 PROCEDURE — 99404 PREV MED CNSL INDIV APPRX 60: CPT | Mod: S$GLB,,,

## 2025-02-11 RX ORDER — GABAPENTIN 300 MG/1
300 CAPSULE ORAL 3 TIMES DAILY
COMMUNITY

## 2025-02-11 RX ORDER — TAMSULOSIN HYDROCHLORIDE 0.4 MG/1
0.4 CAPSULE ORAL DAILY
COMMUNITY

## 2025-02-11 RX ORDER — DM/P-EPHED/ACETAMINOPH/DOXYLAM 30-7.5/3
2 LIQUID (ML) ORAL
Qty: 144 LOZENGE | Refills: 0 | Status: SHIPPED | OUTPATIENT
Start: 2025-02-11

## 2025-02-11 RX ORDER — NICOTINE 7MG/24HR
1 PATCH, TRANSDERMAL 24 HOURS TRANSDERMAL DAILY
Qty: 28 PATCH | Refills: 0 | Status: SHIPPED | OUTPATIENT
Start: 2025-02-11

## 2025-02-11 RX ORDER — TRAZODONE HYDROCHLORIDE 150 MG/1
150 TABLET ORAL NIGHTLY
COMMUNITY

## 2025-02-11 NOTE — PROGRESS NOTES
Individual Follow-Up Form    2/11/2025    Quit Date: 10/4/24    Clinical Status of Patient: Outpatient    Length of Service: 60 minutes    Continuing Medication: yes  Patches or Nicotine Lozenges    Other Medications: none     Target Symptoms: Withdrawal and medication side effects. The following were  rated moderate (3) to severe (4) on TCRS:  Moderate (3): none  Severe (4): none    Comments: Pt presents for follow up.  Pt remains tobacco free.  Pt remains on tobacco cessation regimen of 7 mg nicotine patch QD and 2 mg nicotine lozenge PRN.  No adverse effects noted at this time.  Pt is using 3 lozenges per day. Discussed withdrawal symptoms.  Pt stated that he will occasionally have an urge/crave when he is around other people that are smoking. Discussed controlling his environment. Pt has noticed that his breathing has gotten better. Reviewed strategies, cues, and triggers. Introduced the negative impact of tobacco on health, the health advantages of discontinuing the use of tobacco, time line improved health changes after a quit, withdrawal issues to expect from nicotine and habit. Commended pt on his continued success at remaining tobacco free. Encouraged pt to call if he needs any additional support. Pt's exhaled carbon monoxide level was 1 ppm as per Smokerlyzer. (non- smoker = 0-5 ppm.) Will follow up with pt in a few weeks.     Diagnosis: F17.200    Next Visit: 2 weeks

## 2025-02-19 ENCOUNTER — TELEPHONE (OUTPATIENT)
Dept: RADIOLOGY | Facility: HOSPITAL | Age: 80
End: 2025-02-19
Payer: MEDICARE

## 2025-02-19 NOTE — TELEPHONE ENCOUNTER
LDCT 2/14/25-lung rad 2  RE: 3  Received: Yesterday  Zaria Watkins RN Patin, Brandi, RN  Provider placed order for LDCT scan.            ----- Message -----  From: Zaria Watkins RN  Sent: 2/7/2025  12:00 AM CST  To: Zaria Wtakins RN  Subject: RE: 3                                            Has wellness visit 2/6/25  ----- Message -----  From: Zaria Watkins RN  Sent: 1/20/2025  12:00 AM CST  To: Zaria Watkins RN  Subject: RE: 3                                            Annual ldct scan request faxed to Dr. Pimentel  ----- Message -----  From: Zaria Watkins RN  Sent: 12/30/2024  12:00 AM CST  To: Zaria Watkins RN  Subject: RE: 3                                            Seeing pcp 12/23/24. Request order if not put in at visit.  ----- Message -----  From: Zaria Watkins RN  Sent: 12/2/2024  12:00 AM CST  To: Zaria Watkins RN  Subject: RE: 3                                            Due for annual 12/13/24 12/12/24 ct chest. Please tell the pt that his CT looked good overall. No concerning nodules seen. He has a slight aneurysm (stretching/widening) of the aorta in his chest. It's minor at this time. We need to continue annual CT scans of his chest to monitor.     Gabino Pimentel MD  ----- Message -----  From: Zaria Watkins RN  Sent: 6/10/2024  12:00 AM CDT  To: Zaria Watkins RN  Subject: RE: 3                                            Patient cancelled follow up CT. If not done before, will be due for annual 6/20/24.  ----- Message -----  From: Zaria Watkins RN  Sent: 10/16/2023  12:00 AM CDT  To: Zaria Watkins RN  Subject: RE: 3                                            Order changed. Did patient have scan?  ----- Message -----  From: Zaria Watkins RN  Sent: 9/18/2023  12:00 AM CDT  To: Zaria Watkins RN  Subject: RE: 3                                            Msg sent to provider to change order.  ----- Message -----  From: Zaria Watkins RN  Sent: 8/21/2023  12:00 AM CDT  To: Zaria Watkins,  RN  Subject: RE: 3                                            Provider ordered follow up scan. Request that he change it to regular CT chest. Patient due 9/20/23  ----- Message -----  From: Zaria Watkins RN  Sent: 6/20/2023   8:42 AM CDT  To: Zaria Watkins LPN  Subject: 3                                                3

## 2025-02-28 PROBLEM — N18.2 STAGE 2 CHRONIC KIDNEY DISEASE: Status: ACTIVE | Noted: 2022-08-03

## 2025-03-01 NOTE — ASSESSMENT & PLAN NOTE
Renal indices recently improved some  Continue renal protective measures especially regarding medications  Encouraged hydration

## 2025-03-06 ENCOUNTER — TELEPHONE (OUTPATIENT)
Dept: PRIMARY CARE CLINIC | Facility: CLINIC | Age: 80
End: 2025-03-06
Payer: MEDICARE

## 2025-03-06 NOTE — TELEPHONE ENCOUNTER
Spoke with patient to remind him  of his upcoming appointment . Patient verbalized understanding and confirmed appointment.

## 2025-03-13 ENCOUNTER — CLINICAL SUPPORT (OUTPATIENT)
Dept: SMOKING CESSATION | Facility: CLINIC | Age: 80
End: 2025-03-13
Payer: MEDICARE

## 2025-03-13 DIAGNOSIS — F17.200 NICOTINE DEPENDENCE: Primary | ICD-10-CM

## 2025-03-13 RX ORDER — DM/P-EPHED/ACETAMINOPH/DOXYLAM 30-7.5/3
2 LIQUID (ML) ORAL
Qty: 216 LOZENGE | Refills: 0 | Status: SHIPPED | OUTPATIENT
Start: 2025-03-13

## 2025-03-13 NOTE — PROGRESS NOTES
Individual Follow-Up Form    3/13/2025    Quit Date: 10/4/24    Clinical Status of Patient: Outpatient    Length of Service: 60 minutes    Continuing Medication: yes  Nicotine Lozenges    Other Medications: none     Target Symptoms: Withdrawal and medication side effects. The following were  rated moderate (3) to severe (4) on TCRS:  Moderate (3): none  Severe (4): none    Comments: Pt presents for follow up.  Pt remains tobacco free.  Pt has not smoked since 10/4/24.  Pt remains on tobacco cessation regimen of 2 mg nicotine lozenge PRN.  No adverse effects noted at this time.  Pt is using 3 lozenges per day.  Pt stated that he finished using the 7 mg nicotine patches and does not feel that he needs anymore. Pt is not having any urges/cravings for a cigarette.  Pt was around smokers for Jethro Xiao and stated that it never bothered him. Pt stated that his friends do move away from him when they are actively smoking. Reviewed strategies, cues, triggers, high risk situations, lapses, relapses, diet, exercise, stress, relaxation, sleep, habitual behavior, and life style changes. Commended pt on his continued success at remaining tobacco free.  Encouraged pt to continue to use his coping skills, remain tobacco free and call if he needs any additional support.  Pt has completed the program. Pt's exhaled carbon monoxide level was 1 ppm as per Smokerlyzer. 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

## 2025-03-18 ENCOUNTER — OFFICE VISIT (OUTPATIENT)
Dept: PRIMARY CARE CLINIC | Facility: CLINIC | Age: 80
End: 2025-03-18
Payer: MEDICARE

## 2025-03-18 ENCOUNTER — RESULTS FOLLOW-UP (OUTPATIENT)
Dept: PRIMARY CARE CLINIC | Facility: CLINIC | Age: 80
End: 2025-03-18

## 2025-03-18 ENCOUNTER — LAB VISIT (OUTPATIENT)
Dept: LAB | Facility: HOSPITAL | Age: 80
End: 2025-03-18
Attending: STUDENT IN AN ORGANIZED HEALTH CARE EDUCATION/TRAINING PROGRAM
Payer: MEDICARE

## 2025-03-18 VITALS
RESPIRATION RATE: 15 BRPM | OXYGEN SATURATION: 96 % | DIASTOLIC BLOOD PRESSURE: 92 MMHG | SYSTOLIC BLOOD PRESSURE: 169 MMHG | HEART RATE: 62 BPM | HEIGHT: 66 IN | BODY MASS INDEX: 19.61 KG/M2 | WEIGHT: 122 LBS

## 2025-03-18 DIAGNOSIS — R35.0 URINARY FREQUENCY: ICD-10-CM

## 2025-03-18 DIAGNOSIS — R73.03 PREDIABETES: ICD-10-CM

## 2025-03-18 DIAGNOSIS — D64.9 MILD ANEMIA: ICD-10-CM

## 2025-03-18 DIAGNOSIS — I65.1 BASILAR ARTERY STENOSIS: ICD-10-CM

## 2025-03-18 DIAGNOSIS — R25.1 TREMOR: ICD-10-CM

## 2025-03-18 DIAGNOSIS — R25.1 TREMOR: Primary | ICD-10-CM

## 2025-03-18 DIAGNOSIS — N30.01 ACUTE CYSTITIS WITH HEMATURIA: Primary | ICD-10-CM

## 2025-03-18 LAB
ALBUMIN SERPL-MCNC: 3.9 G/DL (ref 3.4–4.8)
ALBUMIN/GLOB SERPL: 1 RATIO (ref 1.1–2)
ALP SERPL-CCNC: 72 UNIT/L (ref 40–150)
ALT SERPL-CCNC: <25 UNIT/L (ref 0–55)
ANION GAP SERPL CALC-SCNC: 8 MEQ/L
AST SERPL-CCNC: 20 UNIT/L (ref 5–34)
BACTERIA #/AREA URNS AUTO: ABNORMAL /HPF
BASOPHILS # BLD AUTO: 0.05 X10(3)/MCL
BASOPHILS NFR BLD AUTO: 0.6 %
BILIRUB SERPL-MCNC: 0.6 MG/DL
BILIRUB UR QL STRIP.AUTO: NEGATIVE
BUN SERPL-MCNC: 23 MG/DL (ref 8.4–25.7)
CALCIUM SERPL-MCNC: 8.8 MG/DL (ref 8.8–10)
CHLORIDE SERPL-SCNC: 104 MMOL/L (ref 98–107)
CLARITY UR: ABNORMAL
CO2 SERPL-SCNC: 29 MMOL/L (ref 23–31)
COLOR UR AUTO: YELLOW
CREAT SERPL-MCNC: 1.18 MG/DL (ref 0.72–1.25)
CREAT/UREA NIT SERPL: 19
EOSINOPHIL # BLD AUTO: 0.18 X10(3)/MCL (ref 0–0.9)
EOSINOPHIL NFR BLD AUTO: 2.1 %
ERYTHROCYTE [DISTWIDTH] IN BLOOD BY AUTOMATED COUNT: 13.8 % (ref 11.5–17)
EST. AVERAGE GLUCOSE BLD GHB EST-MCNC: 128.4 MG/DL
GFR SERPLBLD CREATININE-BSD FMLA CKD-EPI: >60 ML/MIN/1.73/M2
GLOBULIN SER-MCNC: 4.1 GM/DL (ref 2.4–3.5)
GLUCOSE SERPL-MCNC: 94 MG/DL (ref 82–115)
GLUCOSE UR QL STRIP: NORMAL
HBA1C MFR BLD: 6.1 %
HCT VFR BLD AUTO: 43 % (ref 42–52)
HGB BLD-MCNC: 14.2 G/DL (ref 14–18)
HGB UR QL STRIP: ABNORMAL
IMM GRANULOCYTES # BLD AUTO: 0.02 X10(3)/MCL (ref 0–0.04)
IMM GRANULOCYTES NFR BLD AUTO: 0.2 %
KETONES UR QL STRIP: NEGATIVE
LEUKOCYTE ESTERASE UR QL STRIP: 500
LYMPHOCYTES # BLD AUTO: 1.65 X10(3)/MCL (ref 0.6–4.6)
LYMPHOCYTES NFR BLD AUTO: 19.5 %
MCH RBC QN AUTO: 30.6 PG (ref 27–31)
MCHC RBC AUTO-ENTMCNC: 33 G/DL (ref 33–36)
MCV RBC AUTO: 92.7 FL (ref 80–94)
MONOCYTES # BLD AUTO: 0.79 X10(3)/MCL (ref 0.1–1.3)
MONOCYTES NFR BLD AUTO: 9.3 %
MUCOUS THREADS URNS QL MICRO: ABNORMAL /LPF
NEUTROPHILS # BLD AUTO: 5.77 X10(3)/MCL (ref 2.1–9.2)
NEUTROPHILS NFR BLD AUTO: 68.3 %
NITRITE UR QL STRIP: ABNORMAL
NRBC BLD AUTO-RTO: 0 %
PH UR STRIP: 6 [PH]
PLATELET # BLD AUTO: 197 X10(3)/MCL (ref 130–400)
PMV BLD AUTO: 10.2 FL (ref 7.4–10.4)
POTASSIUM SERPL-SCNC: 4.7 MMOL/L (ref 3.5–5.1)
PROT SERPL-MCNC: 8 GM/DL (ref 5.8–7.6)
PROT UR QL STRIP: ABNORMAL
RBC # BLD AUTO: 4.64 X10(6)/MCL (ref 4.7–6.1)
RBC #/AREA URNS AUTO: ABNORMAL /HPF
SODIUM SERPL-SCNC: 141 MMOL/L (ref 136–145)
SP GR UR STRIP.AUTO: 1.02 (ref 1–1.03)
SPERM URNS QL MICRO: ABNORMAL /HPF
SQUAMOUS #/AREA URNS LPF: ABNORMAL /HPF
TSH SERPL-ACNC: 1.85 UIU/ML (ref 0.35–4.94)
UROBILINOGEN UR STRIP-ACNC: NORMAL
WBC # BLD AUTO: 8.46 X10(3)/MCL (ref 4.5–11.5)
WBC #/AREA URNS AUTO: >100 /HPF
WBC CLUMPS UR QL AUTO: ABNORMAL

## 2025-03-18 PROCEDURE — 1100F PTFALLS ASSESS-DOCD GE2>/YR: CPT | Mod: CPTII,,, | Performed by: STUDENT IN AN ORGANIZED HEALTH CARE EDUCATION/TRAINING PROGRAM

## 2025-03-18 PROCEDURE — 99214 OFFICE O/P EST MOD 30 MIN: CPT | Mod: ,,, | Performed by: STUDENT IN AN ORGANIZED HEALTH CARE EDUCATION/TRAINING PROGRAM

## 2025-03-18 PROCEDURE — 3288F FALL RISK ASSESSMENT DOCD: CPT | Mod: CPTII,,, | Performed by: STUDENT IN AN ORGANIZED HEALTH CARE EDUCATION/TRAINING PROGRAM

## 2025-03-18 PROCEDURE — 87077 CULTURE AEROBIC IDENTIFY: CPT

## 2025-03-18 PROCEDURE — 81001 URINALYSIS AUTO W/SCOPE: CPT

## 2025-03-18 PROCEDURE — 36415 COLL VENOUS BLD VENIPUNCTURE: CPT

## 2025-03-18 PROCEDURE — 80053 COMPREHEN METABOLIC PANEL: CPT

## 2025-03-18 PROCEDURE — 3079F DIAST BP 80-89 MM HG: CPT | Mod: CPTII,,, | Performed by: STUDENT IN AN ORGANIZED HEALTH CARE EDUCATION/TRAINING PROGRAM

## 2025-03-18 PROCEDURE — 83036 HEMOGLOBIN GLYCOSYLATED A1C: CPT

## 2025-03-18 PROCEDURE — 84443 ASSAY THYROID STIM HORMONE: CPT

## 2025-03-18 PROCEDURE — 3077F SYST BP >= 140 MM HG: CPT | Mod: CPTII,,, | Performed by: STUDENT IN AN ORGANIZED HEALTH CARE EDUCATION/TRAINING PROGRAM

## 2025-03-18 PROCEDURE — 85025 COMPLETE CBC W/AUTO DIFF WBC: CPT

## 2025-03-18 PROCEDURE — 1159F MED LIST DOCD IN RCRD: CPT | Mod: CPTII,,, | Performed by: STUDENT IN AN ORGANIZED HEALTH CARE EDUCATION/TRAINING PROGRAM

## 2025-03-18 PROCEDURE — 1160F RVW MEDS BY RX/DR IN RCRD: CPT | Mod: CPTII,,, | Performed by: STUDENT IN AN ORGANIZED HEALTH CARE EDUCATION/TRAINING PROGRAM

## 2025-03-18 RX ORDER — AMOXICILLIN AND CLAVULANATE POTASSIUM 875; 125 MG/1; MG/1
1 TABLET, FILM COATED ORAL 2 TIMES DAILY
Qty: 14 TABLET | Refills: 0 | Status: SHIPPED | OUTPATIENT
Start: 2025-03-18 | End: 2025-03-20

## 2025-03-18 NOTE — PROGRESS NOTES
Subjective:       Patient ID: Gustavo Lanza is a 79 y.o. male.    -------------------------------------    AAA (abdominal aortic aneurysm)    Abdominal aortic aneurysm, without rupture, unspecified    Acute bilateral low back pain without sciatica    Aneurysm of descending thoracic aorta without rupture    Atherosclerosis of arteries of extremities    Benign prostatic hyperplasia without lower urinary tract symptoms    BPH (benign prostatic hyperplasia)    Carotid artery stenosis    Chronic idiopathic constipation    Compression fracture of L1 vertebra, initial encounter    COPD (chronic obstructive pulmonary disease)    Coronary artery disease involving native coronary artery of native heart without angina pectoris    Disorder of kidney and ureter    Hyperlipidemia, unspecified    Impaired fasting glucose    Leg weakness, bilateral    Mixed hyperlipidemia    Neural foraminal stenosis of lumbar spine    Nonrheumatic mitral (valve) stenosis    Occlusion and stenosis of bilateral carotid arteries    Peripheral vascular disease, unspecified    Presence of other vascular implants and grafts    Primary insomnia    Raynaud disease    S/P AAA repair    Solitary pulmonary nodule    Spinal stenosis of lumbar region with neurogenic claudication    Spinal stenosis, lumbosacral region    Stage 3a chronic kidney disease        CHIEF COMPLAINT:  Patient presents today for evaluation of new onset right arm tremors.    NEUROLOGICAL SYMPTOMS:  He reports experiencing right arm tremors that started two weeks ago, occurring intermittently. The first episode was severe enough that he considered calling emergency services. During episodes, he experiences rhinorrhea and dizziness. He denies having control over the tremors when they occur.    GENITOURINARY:  He reports dysuria and urinary frequency, using the bathroom at least 15 times daily. He requires incontinence protection while riding his motorcycle. He has a history of  urinary tract infection in August.    MEDICATIONS:  He recently increased Donepezil (Aricept) from 5 mg to 10 mg. He has been taking vitamins for years. Alendronate was prescribed but he is uncertain about compliance.    SOCIAL HISTORY:  He denies current alcohol use, having quit in the past. He uses medical marijuana when in [place], where he obtains it without cost.       Review of Systems   Constitutional:  Negative for chills and fever.   HENT:  Negative for sinus pressure/congestion and sore throat.    Respiratory:  Negative for cough, shortness of breath and wheezing.    Cardiovascular:  Negative for chest pain and leg swelling.   Gastrointestinal:  Negative for abdominal pain, nausea and vomiting.   Genitourinary:  Positive for frequency. Negative for dysuria and hematuria.   Musculoskeletal:  Negative for arthralgias and myalgias.   Integumentary:  Negative for rash.   Neurological:  Positive for tremors and weakness (legs, chronic). Negative for dizziness and syncope.   Hematological:  Negative for adenopathy.   Psychiatric/Behavioral:  Negative for confusion. The patient is not nervous/anxious.            Objective:      Physical Exam  Vitals and nursing note reviewed.   Constitutional:       General: He is not in acute distress.  HENT:      Head: Normocephalic.      Nose: No rhinorrhea.   Eyes:      Conjunctiva/sclera: Conjunctivae normal.      Pupils: Pupils are equal, round, and reactive to light.   Cardiovascular:      Rate and Rhythm: Normal rate and regular rhythm.      Heart sounds: Murmur (systolic) heard.   Pulmonary:      Effort: Pulmonary effort is normal.      Comments: BS decreased throughout  Abdominal:      Palpations: Abdomen is soft.      Tenderness: There is no abdominal tenderness.   Musculoskeletal:         General: No deformity or signs of injury.   Skin:     General: Skin is warm and dry.   Neurological:      General: No focal deficit present.      Mental Status: He is alert.       Cranial Nerves: No cranial nerve deficit.      Comments: Intermittent diffuse tremors vs chills, upper extremities more pronounced than lower   Psychiatric:         Mood and Affect: Mood normal.         Behavior: Behavior normal.           Assessment & Plan:       TREMOR:  - Observed heavy tremor during the visit, which was intermittent  - No CN deficits noted on exam  - Explained that sudden onset of significant tremors is not typical for conditions like Parkinson's disease, which usually progress slowly over months to years.  - Considered various potential causes for the tremor, including infectino, Parkinson's disease, medication changes, and metabolic issues.  - Ordered labs to check for electrolyte imbalances and calcium problems.  - Ordered a CT of the head.    FREQUENT URINATION:  - Patient reports urinating at least 15 times daily.  - Checking UA today. UTI could explain chills/rigors     OSTEOPOROSIS:  - Evaluated the patient's bone density, noting weakness particularly in the low back.  - Pt is insure if he's taking Alendronate  - Restarted Alendronate 70 mg once weekly for osteoporosis prevention.  - Educated the patient about the importance of consistent medication adherence for osteoporosis management.        Keep scheduled f/u. In addition to their scheduled follow up, the patient has also been instructed to follow up on as needed basis.     This note was generated with the assistance of ambient listening technology. Verbal consent was obtained by the patient and accompanying visitor(s) for the recording of patient appointment to facilitate this note. I attest to having reviewed and edited the generated note for accuracy, though some syntax or spelling errors may persist. Please contact the author of this note for any clarification.

## 2025-03-19 ENCOUNTER — TELEPHONE (OUTPATIENT)
Dept: PRIMARY CARE CLINIC | Facility: CLINIC | Age: 80
End: 2025-03-19
Payer: MEDICARE

## 2025-03-20 ENCOUNTER — HOSPITAL ENCOUNTER (INPATIENT)
Facility: HOSPITAL | Age: 80
LOS: 1 days | Discharge: LEFT BEFORE DISCHARGE | DRG: 189 | End: 2025-03-21
Attending: INTERNAL MEDICINE | Admitting: STUDENT IN AN ORGANIZED HEALTH CARE EDUCATION/TRAINING PROGRAM
Payer: MEDICARE

## 2025-03-20 DIAGNOSIS — J44.1 COPD EXACERBATION: Primary | ICD-10-CM

## 2025-03-20 DIAGNOSIS — N30.01 ACUTE CYSTITIS WITH HEMATURIA: ICD-10-CM

## 2025-03-20 DIAGNOSIS — R07.9 CHEST PAIN: ICD-10-CM

## 2025-03-20 DIAGNOSIS — R79.89 POSITIVE D DIMER: ICD-10-CM

## 2025-03-20 DIAGNOSIS — R06.02 SHORTNESS OF BREATH: ICD-10-CM

## 2025-03-20 DIAGNOSIS — F51.01 PRIMARY INSOMNIA: Primary | ICD-10-CM

## 2025-03-20 LAB
ALBUMIN SERPL-MCNC: 3.8 G/DL (ref 3.4–4.8)
ALBUMIN/GLOB SERPL: 1 RATIO (ref 1.1–2)
ALLENS TEST BLOOD GAS (OHS): YES
ALP SERPL-CCNC: 77 UNIT/L (ref 40–150)
ALT SERPL-CCNC: 30 UNIT/L (ref 0–55)
ANION GAP SERPL CALC-SCNC: 9 MEQ/L
AST SERPL-CCNC: 50 UNIT/L (ref 11–45)
BACTERIA UR CULT: ABNORMAL
BASE EXCESS BLD CALC-SCNC: -1.4 MMOL/L (ref -2–2)
BASOPHILS # BLD AUTO: 0.06 X10(3)/MCL
BASOPHILS NFR BLD AUTO: 0.3 %
BILIRUB SERPL-MCNC: 0.4 MG/DL
BLOOD GAS SAMPLE TYPE (OHS): ABNORMAL
BUN SERPL-MCNC: 19.8 MG/DL (ref 8.4–25.7)
CA-I BLD-SCNC: 1.22 MMOL/L (ref 1.12–1.23)
CALCIUM SERPL-MCNC: 8.9 MG/DL (ref 8.8–10)
CHLORIDE SERPL-SCNC: 110 MMOL/L (ref 98–107)
CO2 BLDA-SCNC: 32.1 MMOL/L
CO2 SERPL-SCNC: 23 MMOL/L (ref 23–31)
COHGB MFR BLDA: 5.2 % (ref 0.5–1.5)
CREAT SERPL-MCNC: 1.08 MG/DL (ref 0.72–1.25)
CREAT/UREA NIT SERPL: 18
DRAWN BY BLOOD GAS (OHS): ABNORMAL
EOSINOPHIL # BLD AUTO: 0.38 X10(3)/MCL (ref 0–0.9)
EOSINOPHIL NFR BLD AUTO: 2.1 %
ERYTHROCYTE [DISTWIDTH] IN BLOOD BY AUTOMATED COUNT: 13.4 % (ref 11.5–17)
GFR SERPLBLD CREATININE-BSD FMLA CKD-EPI: >60 ML/MIN/1.73/M2
GLOBULIN SER-MCNC: 3.8 GM/DL (ref 2.4–3.5)
GLUCOSE SERPL-MCNC: 135 MG/DL (ref 82–115)
HCO3 BLDA-SCNC: 29.6 MMOL/L (ref 22–26)
HCT VFR BLD AUTO: 43.4 % (ref 42–52)
HGB BLD-MCNC: 14.3 G/DL (ref 14–18)
IMM GRANULOCYTES # BLD AUTO: 0.07 X10(3)/MCL (ref 0–0.04)
IMM GRANULOCYTES NFR BLD AUTO: 0.4 %
LPM (OHS): 4
LYMPHOCYTES # BLD AUTO: 3.82 X10(3)/MCL (ref 0.6–4.6)
LYMPHOCYTES NFR BLD AUTO: 21.5 %
MCH RBC QN AUTO: 30.4 PG (ref 27–31)
MCHC RBC AUTO-ENTMCNC: 32.9 G/DL (ref 33–36)
MCV RBC AUTO: 92.1 FL (ref 80–94)
METHGB MFR BLDA: 0.7 % (ref 0.4–1.5)
MONOCYTES # BLD AUTO: 0.67 X10(3)/MCL (ref 0.1–1.3)
MONOCYTES NFR BLD AUTO: 3.8 %
NEUTROPHILS # BLD AUTO: 12.74 X10(3)/MCL (ref 2.1–9.2)
NEUTROPHILS NFR BLD AUTO: 71.9 %
NRBC BLD AUTO-RTO: 0 %
O2 HB BLOOD GAS (OHS): 86.9 % (ref 94–97)
OXYGEN DEVICE BLOOD GAS (OHS): ABNORMAL
OXYHGB MFR BLDA: 13.8 G/DL (ref 12–16)
PCO2 BLDA: 83 MMHG (ref 35–45)
PH BLDA: 7.16 [PH] (ref 7.35–7.45)
PLATELET # BLD AUTO: 169 X10(3)/MCL (ref 130–400)
PLATELETS.RETICULATED NFR BLD AUTO: 5.6 % (ref 0.9–11.2)
PMV BLD AUTO: 10.2 FL (ref 7.4–10.4)
PO2 BLDA: 65 MMHG (ref 80–100)
POTASSIUM BLOOD GAS (OHS): 3.7 MMOL/L (ref 3.5–5)
POTASSIUM SERPL-SCNC: 5.1 MMOL/L (ref 3.5–5.1)
PROT SERPL-MCNC: 7.6 GM/DL (ref 5.8–7.6)
RBC # BLD AUTO: 4.71 X10(6)/MCL (ref 4.7–6.1)
SAMPLE SITE BLOOD GAS (OHS): ABNORMAL
SAO2 % BLDA: 85.6 %
SODIUM BLOOD GAS (OHS): 139 MMOL/L (ref 137–145)
SODIUM SERPL-SCNC: 142 MMOL/L (ref 136–145)
TROPONIN I SERPL-MCNC: <0.01 NG/ML (ref 0–0.04)
WBC # BLD AUTO: 17.74 X10(3)/MCL (ref 4.5–11.5)

## 2025-03-20 PROCEDURE — 27000190 HC CPAP FULL FACE MASK W/VALVE

## 2025-03-20 PROCEDURE — 93010 ELECTROCARDIOGRAM REPORT: CPT | Mod: ,,, | Performed by: INTERNAL MEDICINE

## 2025-03-20 PROCEDURE — 99900035 HC TECH TIME PER 15 MIN (STAT)

## 2025-03-20 PROCEDURE — 94760 N-INVAS EAR/PLS OXIMETRY 1: CPT | Mod: XB

## 2025-03-20 PROCEDURE — 96374 THER/PROPH/DIAG INJ IV PUSH: CPT | Mod: 59

## 2025-03-20 PROCEDURE — 5A09357 ASSISTANCE WITH RESPIRATORY VENTILATION, LESS THAN 24 CONSECUTIVE HOURS, CONTINUOUS POSITIVE AIRWAY PRESSURE: ICD-10-PCS | Performed by: INTERNAL MEDICINE

## 2025-03-20 PROCEDURE — 80053 COMPREHEN METABOLIC PANEL: CPT | Performed by: INTERNAL MEDICINE

## 2025-03-20 PROCEDURE — 99291 CRITICAL CARE FIRST HOUR: CPT

## 2025-03-20 PROCEDURE — 85025 COMPLETE CBC W/AUTO DIFF WBC: CPT | Performed by: INTERNAL MEDICINE

## 2025-03-20 PROCEDURE — 84484 ASSAY OF TROPONIN QUANT: CPT | Performed by: INTERNAL MEDICINE

## 2025-03-20 PROCEDURE — 36600 WITHDRAWAL OF ARTERIAL BLOOD: CPT

## 2025-03-20 PROCEDURE — 82803 BLOOD GASES ANY COMBINATION: CPT

## 2025-03-20 PROCEDURE — 93005 ELECTROCARDIOGRAM TRACING: CPT

## 2025-03-20 PROCEDURE — 27100171 HC OXYGEN HIGH FLOW UP TO 24 HOURS

## 2025-03-20 PROCEDURE — 63600175 PHARM REV CODE 636 W HCPCS: Mod: JZ,TB | Performed by: INTERNAL MEDICINE

## 2025-03-20 PROCEDURE — 94660 CPAP INITIATION&MGMT: CPT

## 2025-03-20 PROCEDURE — 94644 CONT INHLJ TX 1ST HOUR: CPT

## 2025-03-20 PROCEDURE — 25000242 PHARM REV CODE 250 ALT 637 W/ HCPCS: Performed by: INTERNAL MEDICINE

## 2025-03-20 PROCEDURE — 99900031 HC PATIENT EDUCATION (STAT)

## 2025-03-20 RX ORDER — TRAZODONE HYDROCHLORIDE 150 MG/1
150 TABLET ORAL
Qty: 90 TABLET | Refills: 3 | Status: ON HOLD | OUTPATIENT
Start: 2025-03-20

## 2025-03-20 RX ORDER — SULFAMETHOXAZOLE AND TRIMETHOPRIM 800; 160 MG/1; MG/1
1 TABLET ORAL 2 TIMES DAILY
Qty: 10 TABLET | Refills: 0 | Status: SHIPPED | OUTPATIENT
Start: 2025-03-20 | End: 2025-03-21 | Stop reason: HOSPADM

## 2025-03-20 RX ORDER — LORAZEPAM 2 MG/ML
1 INJECTION INTRAMUSCULAR
Status: COMPLETED | OUTPATIENT
Start: 2025-03-20 | End: 2025-03-21

## 2025-03-20 RX ORDER — METHYLPREDNISOLONE SOD SUCC 125 MG
125 VIAL (EA) INJECTION
Status: COMPLETED | OUTPATIENT
Start: 2025-03-20 | End: 2025-03-20

## 2025-03-20 RX ORDER — IPRATROPIUM BROMIDE AND ALBUTEROL SULFATE 2.5; .5 MG/3ML; MG/3ML
3 SOLUTION RESPIRATORY (INHALATION)
Status: COMPLETED | OUTPATIENT
Start: 2025-03-20 | End: 2025-03-20

## 2025-03-20 RX ADMIN — IPRATROPIUM BROMIDE AND ALBUTEROL SULFATE 3 ML: .5; 3 SOLUTION RESPIRATORY (INHALATION) at 11:03

## 2025-03-20 RX ADMIN — METHYLPREDNISOLONE SODIUM SUCCINATE 125 MG: 125 INJECTION, POWDER, FOR SOLUTION INTRAMUSCULAR; INTRAVENOUS at 11:03

## 2025-03-20 NOTE — Clinical Note
Diagnosis: COPD exacerbation [[id number]]   Future Attending Provider: REYES, THAIRY G [[id number]]   Admit to which facility:: [facility] [[id number]]   Reason for IP Medical Treatment  (Clinical interventions that can only be accomplished in the IP setting? ) :: COPD exacerbation, acute hypoxic respiratory failure

## 2025-03-21 ENCOUNTER — HOSPITAL ENCOUNTER (OUTPATIENT)
Facility: HOSPITAL | Age: 80
Discharge: HOME | End: 2025-03-23
Attending: EMERGENCY MEDICINE | Admitting: INTERNAL MEDICINE
Payer: MEDICARE

## 2025-03-21 VITALS
DIASTOLIC BLOOD PRESSURE: 71 MMHG | HEIGHT: 71 IN | SYSTOLIC BLOOD PRESSURE: 148 MMHG | BODY MASS INDEX: 19.6 KG/M2 | HEART RATE: 65 BPM | OXYGEN SATURATION: 100 % | WEIGHT: 140 LBS | RESPIRATION RATE: 20 BRPM | TEMPERATURE: 98 F

## 2025-03-21 DIAGNOSIS — J44.1 COPD EXACERBATION: Primary | ICD-10-CM

## 2025-03-21 DIAGNOSIS — J44.9 CHRONIC OBSTRUCTIVE PULMONARY DISEASE, UNSPECIFIED COPD TYPE: Chronic | ICD-10-CM

## 2025-03-21 DIAGNOSIS — R06.02 SOB (SHORTNESS OF BREATH): ICD-10-CM

## 2025-03-21 PROBLEM — R06.00 DYSPNEA: Status: ACTIVE | Noted: 2025-03-21

## 2025-03-21 LAB
ALBUMIN SERPL-MCNC: 3.7 G/DL (ref 3.4–4.8)
ALBUMIN SERPL-MCNC: 3.8 G/DL (ref 3.4–4.8)
ALBUMIN/GLOB SERPL: 0.9 RATIO (ref 1.1–2)
ALBUMIN/GLOB SERPL: 1 RATIO (ref 1.1–2)
ALLENS TEST BLOOD GAS (OHS): YES
ALP SERPL-CCNC: 70 UNIT/L (ref 40–150)
ALP SERPL-CCNC: 75 UNIT/L (ref 40–150)
ALT SERPL-CCNC: 23 UNIT/L (ref 0–55)
ALT SERPL-CCNC: 28 UNIT/L (ref 0–55)
ANION GAP SERPL CALC-SCNC: 10 MEQ/L
ANION GAP SERPL CALC-SCNC: 13 MEQ/L
AST SERPL-CCNC: 22 UNIT/L (ref 11–45)
AST SERPL-CCNC: 29 UNIT/L (ref 11–45)
B PERT.PT PRMT NPH QL NAA+NON-PROBE: NOT DETECTED
BASE EXCESS BLD CALC-SCNC: 0.6 MMOL/L (ref -2–2)
BASOPHILS # BLD AUTO: 0.02 X10(3)/MCL
BASOPHILS # BLD AUTO: 0.03 X10(3)/MCL
BASOPHILS NFR BLD AUTO: 0.1 %
BASOPHILS NFR BLD AUTO: 0.1 %
BILIRUB SERPL-MCNC: 0.3 MG/DL
BILIRUB SERPL-MCNC: 0.4 MG/DL
BLOOD GAS SAMPLE TYPE (OHS): ABNORMAL
BNP BLD-MCNC: 302.2 PG/ML
BUN SERPL-MCNC: 19.2 MG/DL (ref 8.4–25.7)
BUN SERPL-MCNC: 28.1 MG/DL (ref 8.4–25.7)
C PNEUM DNA NPH QL NAA+NON-PROBE: NOT DETECTED
CA-I BLD-SCNC: 1.17 MMOL/L (ref 1.12–1.23)
CALCIUM SERPL-MCNC: 8.9 MG/DL (ref 8.8–10)
CALCIUM SERPL-MCNC: 9.6 MG/DL (ref 8.8–10)
CHLORIDE SERPL-SCNC: 103 MMOL/L (ref 98–107)
CHLORIDE SERPL-SCNC: 104 MMOL/L (ref 98–107)
CO2 BLDA-SCNC: 29 MMOL/L
CO2 SERPL-SCNC: 24 MMOL/L (ref 23–31)
CO2 SERPL-SCNC: 25 MMOL/L (ref 23–31)
COHGB MFR BLDA: 3.8 % (ref 0.5–1.5)
CREAT SERPL-MCNC: 1.17 MG/DL (ref 0.72–1.25)
CREAT SERPL-MCNC: 1.45 MG/DL (ref 0.72–1.25)
CREAT/UREA NIT SERPL: 16
CREAT/UREA NIT SERPL: 19
D DIMER PPP IA.FEU-MCNC: 3.61 UG/ML FEU (ref 0–0.5)
DRAWN BY BLOOD GAS (OHS): ABNORMAL
EOSINOPHIL # BLD AUTO: 0 X10(3)/MCL (ref 0–0.9)
EOSINOPHIL # BLD AUTO: 0.01 X10(3)/MCL (ref 0–0.9)
EOSINOPHIL NFR BLD AUTO: 0 %
EOSINOPHIL NFR BLD AUTO: 0.1 %
EPAP (OHS): 6 CMH2O
ERYTHROCYTE [DISTWIDTH] IN BLOOD BY AUTOMATED COUNT: 13.6 % (ref 11.5–17)
ERYTHROCYTE [DISTWIDTH] IN BLOOD BY AUTOMATED COUNT: 14.2 % (ref 11.5–17)
FLUAV AG UPPER RESP QL IA.RAPID: NOT DETECTED
FLUBV AG UPPER RESP QL IA.RAPID: NOT DETECTED
GFR SERPLBLD CREATININE-BSD FMLA CKD-EPI: 50 ML/MIN/1.73/M2
GFR SERPLBLD CREATININE-BSD FMLA CKD-EPI: >60 ML/MIN/1.73/M2
GLOBULIN SER-MCNC: 3.8 GM/DL (ref 2.4–3.5)
GLOBULIN SER-MCNC: 3.9 GM/DL (ref 2.4–3.5)
GLUCOSE SERPL-MCNC: 143 MG/DL (ref 82–115)
GLUCOSE SERPL-MCNC: 197 MG/DL (ref 82–115)
HADV DNA NPH QL NAA+NON-PROBE: NOT DETECTED
HCO3 BLDA-SCNC: 27.4 MMOL/L (ref 22–26)
HCOV 229E RNA NPH QL NAA+NON-PROBE: NOT DETECTED
HCOV HKU1 RNA NPH QL NAA+NON-PROBE: NOT DETECTED
HCOV NL63 RNA NPH QL NAA+NON-PROBE: NOT DETECTED
HCOV OC43 RNA NPH QL NAA+NON-PROBE: NOT DETECTED
HCT VFR BLD AUTO: 41 % (ref 42–52)
HCT VFR BLD AUTO: 43 % (ref 42–52)
HGB BLD-MCNC: 13.8 G/DL (ref 14–18)
HGB BLD-MCNC: 14.1 G/DL (ref 14–18)
HMPV RNA NPH QL NAA+NON-PROBE: NOT DETECTED
HPIV1 RNA NPH QL NAA+NON-PROBE: NOT DETECTED
HPIV2 RNA NPH QL NAA+NON-PROBE: NOT DETECTED
HPIV3 RNA NPH QL NAA+NON-PROBE: NOT DETECTED
HPIV4 RNA NPH QL NAA+NON-PROBE: NOT DETECTED
IMM GRANULOCYTES # BLD AUTO: 0.06 X10(3)/MCL (ref 0–0.04)
IMM GRANULOCYTES # BLD AUTO: 0.07 X10(3)/MCL (ref 0–0.04)
IMM GRANULOCYTES NFR BLD AUTO: 0.3 %
IMM GRANULOCYTES NFR BLD AUTO: 0.4 %
INHALED O2 CONCENTRATION: 0 %
IPAP (OHS): 16 CMH2O
LYMPHOCYTES # BLD AUTO: 0.4 X10(3)/MCL (ref 0.6–4.6)
LYMPHOCYTES # BLD AUTO: 0.93 X10(3)/MCL (ref 0.6–4.6)
LYMPHOCYTES NFR BLD AUTO: 2.7 %
LYMPHOCYTES NFR BLD AUTO: 4.6 %
M PNEUMO DNA NPH QL NAA+NON-PROBE: NOT DETECTED
MAGNESIUM SERPL-MCNC: 2.2 MG/DL (ref 1.6–2.6)
MCH RBC QN AUTO: 29.9 PG (ref 27–31)
MCH RBC QN AUTO: 30.5 PG (ref 27–31)
MCHC RBC AUTO-ENTMCNC: 32.8 G/DL (ref 33–36)
MCHC RBC AUTO-ENTMCNC: 33.7 G/DL (ref 33–36)
MCV RBC AUTO: 90.7 FL (ref 80–94)
MCV RBC AUTO: 91.3 FL (ref 80–94)
METHGB MFR BLDA: 1.1 % (ref 0.4–1.5)
MODE (OHS): ABNORMAL
MONOCYTES # BLD AUTO: 0.21 X10(3)/MCL (ref 0.1–1.3)
MONOCYTES # BLD AUTO: 0.85 X10(3)/MCL (ref 0.1–1.3)
MONOCYTES NFR BLD AUTO: 1.4 %
MONOCYTES NFR BLD AUTO: 4.2 %
MRSA PCR SCRN (OHS): NOT DETECTED
NEUTROPHILS # BLD AUTO: 14.3 X10(3)/MCL (ref 2.1–9.2)
NEUTROPHILS # BLD AUTO: 18.3 X10(3)/MCL (ref 2.1–9.2)
NEUTROPHILS NFR BLD AUTO: 90.8 %
NEUTROPHILS NFR BLD AUTO: 95.3 %
NRBC BLD AUTO-RTO: 0 %
NRBC BLD AUTO-RTO: 0 %
O2 HB BLOOD GAS (OHS): 93.2 % (ref 94–97)
OXYHGB MFR BLDA: 12.9 G/DL (ref 12–16)
PCO2 BLDA: 52 MMHG (ref 35–45)
PH BLDA: 7.33 [PH] (ref 7.35–7.45)
PLATELET # BLD AUTO: 176 X10(3)/MCL (ref 130–400)
PLATELET # BLD AUTO: 208 X10(3)/MCL (ref 130–400)
PMV BLD AUTO: 9.8 FL (ref 7.4–10.4)
PMV BLD AUTO: 9.9 FL (ref 7.4–10.4)
PO2 BLDA: 84 MMHG (ref 80–100)
POTASSIUM BLOOD GAS (OHS): 4.3 MMOL/L (ref 3.5–5)
POTASSIUM SERPL-SCNC: 3.9 MMOL/L (ref 3.5–5.1)
POTASSIUM SERPL-SCNC: 4.5 MMOL/L (ref 3.5–5.1)
PROT SERPL-MCNC: 7.6 GM/DL (ref 5.8–7.6)
PROT SERPL-MCNC: 7.6 GM/DL (ref 5.8–7.6)
RBC # BLD AUTO: 4.52 X10(6)/MCL (ref 4.7–6.1)
RBC # BLD AUTO: 4.71 X10(6)/MCL (ref 4.7–6.1)
RSV A 5' UTR RNA NPH QL NAA+PROBE: NOT DETECTED
RSV RNA NPH QL NAA+NON-PROBE: NOT DETECTED
RV+EV RNA NPH QL NAA+NON-PROBE: NOT DETECTED
SAMPLE SITE BLOOD GAS (OHS): ABNORMAL
SAO2 % BLDA: 95.5 %
SARS-COV-2 RNA RESP QL NAA+PROBE: NOT DETECTED
SODIUM BLOOD GAS (OHS): 138 MMOL/L (ref 137–145)
SODIUM SERPL-SCNC: 138 MMOL/L (ref 136–145)
SODIUM SERPL-SCNC: 141 MMOL/L (ref 136–145)
WBC # BLD AUTO: 15 X10(3)/MCL (ref 4.5–11.5)
WBC # BLD AUTO: 20.18 X10(3)/MCL (ref 4.5–11.5)

## 2025-03-21 PROCEDURE — 93010 ELECTROCARDIOGRAM REPORT: CPT | Mod: ,,, | Performed by: INTERNAL MEDICINE

## 2025-03-21 PROCEDURE — 94640 AIRWAY INHALATION TREATMENT: CPT

## 2025-03-21 PROCEDURE — 93005 ELECTROCARDIOGRAM TRACING: CPT

## 2025-03-21 PROCEDURE — 87641 MR-STAPH DNA AMP PROBE: CPT | Performed by: PHYSICIAN ASSISTANT

## 2025-03-21 PROCEDURE — 36600 WITHDRAWAL OF ARTERIAL BLOOD: CPT

## 2025-03-21 PROCEDURE — 63600175 PHARM REV CODE 636 W HCPCS: Mod: JZ,TB | Performed by: NURSE PRACTITIONER

## 2025-03-21 PROCEDURE — 83880 ASSAY OF NATRIURETIC PEPTIDE: CPT | Performed by: NURSE PRACTITIONER

## 2025-03-21 PROCEDURE — 87486 CHLMYD PNEUM DNA AMP PROBE: CPT | Performed by: PHYSICIAN ASSISTANT

## 2025-03-21 PROCEDURE — 0241U COVID/RSV/FLU A&B PCR: CPT | Performed by: INTERNAL MEDICINE

## 2025-03-21 PROCEDURE — 99900035 HC TECH TIME PER 15 MIN (STAT)

## 2025-03-21 PROCEDURE — 80053 COMPREHEN METABOLIC PANEL: CPT | Performed by: NURSE PRACTITIONER

## 2025-03-21 PROCEDURE — G0378 HOSPITAL OBSERVATION PER HR: HCPCS

## 2025-03-21 PROCEDURE — 94760 N-INVAS EAR/PLS OXIMETRY 1: CPT | Mod: XB

## 2025-03-21 PROCEDURE — 83735 ASSAY OF MAGNESIUM: CPT | Performed by: NURSE PRACTITIONER

## 2025-03-21 PROCEDURE — 99900031 HC PATIENT EDUCATION (STAT)

## 2025-03-21 PROCEDURE — 25000003 PHARM REV CODE 250: Performed by: EMERGENCY MEDICINE

## 2025-03-21 PROCEDURE — 11000001 HC ACUTE MED/SURG PRIVATE ROOM

## 2025-03-21 PROCEDURE — 25000003 PHARM REV CODE 250: Performed by: INTERNAL MEDICINE

## 2025-03-21 PROCEDURE — 80053 COMPREHEN METABOLIC PANEL: CPT | Performed by: EMERGENCY MEDICINE

## 2025-03-21 PROCEDURE — 96365 THER/PROPH/DIAG IV INF INIT: CPT

## 2025-03-21 PROCEDURE — 96368 THER/DIAG CONCURRENT INF: CPT

## 2025-03-21 PROCEDURE — 25000242 PHARM REV CODE 250 ALT 637 W/ HCPCS: Performed by: EMERGENCY MEDICINE

## 2025-03-21 PROCEDURE — 63600175 PHARM REV CODE 636 W HCPCS: Performed by: INTERNAL MEDICINE

## 2025-03-21 PROCEDURE — 85379 FIBRIN DEGRADATION QUANT: CPT | Performed by: NURSE PRACTITIONER

## 2025-03-21 PROCEDURE — 85025 COMPLETE CBC W/AUTO DIFF WBC: CPT | Performed by: NURSE PRACTITIONER

## 2025-03-21 PROCEDURE — 25500020 PHARM REV CODE 255: Performed by: STUDENT IN AN ORGANIZED HEALTH CARE EDUCATION/TRAINING PROGRAM

## 2025-03-21 PROCEDURE — 82803 BLOOD GASES ANY COMBINATION: CPT

## 2025-03-21 PROCEDURE — 87040 BLOOD CULTURE FOR BACTERIA: CPT | Performed by: INTERNAL MEDICINE

## 2025-03-21 PROCEDURE — 96375 TX/PRO/DX INJ NEW DRUG ADDON: CPT

## 2025-03-21 PROCEDURE — 63600175 PHARM REV CODE 636 W HCPCS: Performed by: EMERGENCY MEDICINE

## 2025-03-21 PROCEDURE — 99285 EMERGENCY DEPT VISIT HI MDM: CPT | Mod: 25

## 2025-03-21 PROCEDURE — 27100171 HC OXYGEN HIGH FLOW UP TO 24 HOURS

## 2025-03-21 PROCEDURE — 85025 COMPLETE CBC W/AUTO DIFF WBC: CPT | Performed by: EMERGENCY MEDICINE

## 2025-03-21 RX ORDER — INSULIN ASPART 100 [IU]/ML
0-10 INJECTION, SOLUTION INTRAVENOUS; SUBCUTANEOUS
Status: DISCONTINUED | OUTPATIENT
Start: 2025-03-21 | End: 2025-03-21 | Stop reason: HOSPADM

## 2025-03-21 RX ORDER — SODIUM CHLORIDE 0.9 % (FLUSH) 0.9 %
10 SYRINGE (ML) INJECTION
Status: DISCONTINUED | OUTPATIENT
Start: 2025-03-21 | End: 2025-03-23 | Stop reason: HOSPADM

## 2025-03-21 RX ORDER — SULFAMETHOXAZOLE AND TRIMETHOPRIM 400; 80 MG/1; MG/1
1 TABLET ORAL 2 TIMES DAILY
Qty: 6 TABLET | Refills: 0 | Status: ON HOLD | OUTPATIENT
Start: 2025-03-21 | End: 2025-03-23

## 2025-03-21 RX ORDER — CEFTRIAXONE 1 G/1
1 INJECTION, POWDER, FOR SOLUTION INTRAMUSCULAR; INTRAVENOUS
Status: DISCONTINUED | OUTPATIENT
Start: 2025-03-21 | End: 2025-03-23 | Stop reason: HOSPADM

## 2025-03-21 RX ORDER — SODIUM CHLORIDE 9 MG/ML
1000 INJECTION, SOLUTION INTRAVENOUS
Status: COMPLETED | OUTPATIENT
Start: 2025-03-21 | End: 2025-03-21

## 2025-03-21 RX ORDER — TALC
6 POWDER (GRAM) TOPICAL NIGHTLY PRN
Status: DISCONTINUED | OUTPATIENT
Start: 2025-03-21 | End: 2025-03-21 | Stop reason: HOSPADM

## 2025-03-21 RX ORDER — ASPIRIN 81 MG/1
81 TABLET ORAL DAILY
Status: DISCONTINUED | OUTPATIENT
Start: 2025-03-22 | End: 2025-03-23 | Stop reason: HOSPADM

## 2025-03-21 RX ORDER — ONDANSETRON HYDROCHLORIDE 2 MG/ML
4 INJECTION, SOLUTION INTRAVENOUS EVERY 6 HOURS PRN
Status: DISCONTINUED | OUTPATIENT
Start: 2025-03-21 | End: 2025-03-23 | Stop reason: HOSPADM

## 2025-03-21 RX ORDER — ACETAMINOPHEN 325 MG/1
650 TABLET ORAL EVERY 6 HOURS PRN
Status: DISCONTINUED | OUTPATIENT
Start: 2025-03-21 | End: 2025-03-23 | Stop reason: HOSPADM

## 2025-03-21 RX ORDER — FAMOTIDINE 10 MG/ML
20 INJECTION, SOLUTION INTRAVENOUS DAILY
Status: DISCONTINUED | OUTPATIENT
Start: 2025-03-21 | End: 2025-03-23

## 2025-03-21 RX ORDER — BENZONATATE 100 MG/1
200 CAPSULE ORAL 3 TIMES DAILY PRN
Status: DISCONTINUED | OUTPATIENT
Start: 2025-03-21 | End: 2025-03-21 | Stop reason: HOSPADM

## 2025-03-21 RX ORDER — IPRATROPIUM BROMIDE AND ALBUTEROL SULFATE 2.5; .5 MG/3ML; MG/3ML
3 SOLUTION RESPIRATORY (INHALATION) EVERY 4 HOURS PRN
Status: DISCONTINUED | OUTPATIENT
Start: 2025-03-21 | End: 2025-03-21 | Stop reason: HOSPADM

## 2025-03-21 RX ORDER — FINASTERIDE 5 MG/1
5 TABLET, FILM COATED ORAL DAILY
Status: DISCONTINUED | OUTPATIENT
Start: 2025-03-22 | End: 2025-03-23 | Stop reason: HOSPADM

## 2025-03-21 RX ORDER — SODIUM CHLORIDE 0.9 % (FLUSH) 0.9 %
10 SYRINGE (ML) INJECTION
Status: DISCONTINUED | OUTPATIENT
Start: 2025-03-21 | End: 2025-03-21 | Stop reason: HOSPADM

## 2025-03-21 RX ORDER — CEFTRIAXONE 2 G/1
2 INJECTION, POWDER, FOR SOLUTION INTRAMUSCULAR; INTRAVENOUS ONCE
Status: COMPLETED | OUTPATIENT
Start: 2025-03-21 | End: 2025-03-21

## 2025-03-21 RX ORDER — ALBUTEROL SULFATE 0.83 MG/ML
2.5 SOLUTION RESPIRATORY (INHALATION)
Status: COMPLETED | OUTPATIENT
Start: 2025-03-21 | End: 2025-03-21

## 2025-03-21 RX ORDER — GUAIFENESIN AND DEXTROMETHORPHAN HYDROBROMIDE 10; 100 MG/5ML; MG/5ML
5 SYRUP ORAL EVERY 4 HOURS PRN
Status: DISCONTINUED | OUTPATIENT
Start: 2025-03-21 | End: 2025-03-21 | Stop reason: HOSPADM

## 2025-03-21 RX ORDER — SULFAMETHOXAZOLE AND TRIMETHOPRIM 800; 160 MG/1; MG/1
1 TABLET ORAL 2 TIMES DAILY
Status: ACTIVE | OUTPATIENT
Start: 2025-03-21 | End: 2025-03-24

## 2025-03-21 RX ORDER — CARVEDILOL 3.12 MG/1
3.12 TABLET ORAL 2 TIMES DAILY
Status: DISCONTINUED | OUTPATIENT
Start: 2025-03-21 | End: 2025-03-23 | Stop reason: HOSPADM

## 2025-03-21 RX ORDER — CEFTRIAXONE 1 G/1
1 INJECTION, POWDER, FOR SOLUTION INTRAMUSCULAR; INTRAVENOUS
Status: DISCONTINUED | OUTPATIENT
Start: 2025-03-22 | End: 2025-03-21 | Stop reason: HOSPADM

## 2025-03-21 RX ORDER — TRAZODONE HYDROCHLORIDE 50 MG/1
100 TABLET ORAL NIGHTLY PRN
Status: DISCONTINUED | OUTPATIENT
Start: 2025-03-21 | End: 2025-03-23 | Stop reason: HOSPADM

## 2025-03-21 RX ORDER — ALUMINUM HYDROXIDE, MAGNESIUM HYDROXIDE, AND SIMETHICONE 1200; 120; 1200 MG/30ML; MG/30ML; MG/30ML
30 SUSPENSION ORAL 4 TIMES DAILY PRN
Status: DISCONTINUED | OUTPATIENT
Start: 2025-03-21 | End: 2025-03-21 | Stop reason: HOSPADM

## 2025-03-21 RX ORDER — ALBUTEROL SULFATE 0.83 MG/ML
2.5 SOLUTION RESPIRATORY (INHALATION) EVERY 6 HOURS
Status: DISCONTINUED | OUTPATIENT
Start: 2025-03-21 | End: 2025-03-22

## 2025-03-21 RX ORDER — GUAIFENESIN 600 MG/1
600 TABLET, EXTENDED RELEASE ORAL 2 TIMES DAILY
Status: DISCONTINUED | OUTPATIENT
Start: 2025-03-21 | End: 2025-03-23 | Stop reason: HOSPADM

## 2025-03-21 RX ORDER — ONDANSETRON HYDROCHLORIDE 2 MG/ML
4 INJECTION, SOLUTION INTRAVENOUS EVERY 4 HOURS PRN
Status: DISCONTINUED | OUTPATIENT
Start: 2025-03-21 | End: 2025-03-21 | Stop reason: HOSPADM

## 2025-03-21 RX ORDER — GLUCAGON 1 MG
1 KIT INJECTION
Status: DISCONTINUED | OUTPATIENT
Start: 2025-03-21 | End: 2025-03-21 | Stop reason: HOSPADM

## 2025-03-21 RX ORDER — NALOXONE HCL 0.4 MG/ML
0.02 VIAL (ML) INJECTION
Status: DISCONTINUED | OUTPATIENT
Start: 2025-03-21 | End: 2025-03-21 | Stop reason: HOSPADM

## 2025-03-21 RX ORDER — IBUPROFEN 200 MG
24 TABLET ORAL
Status: DISCONTINUED | OUTPATIENT
Start: 2025-03-21 | End: 2025-03-21 | Stop reason: HOSPADM

## 2025-03-21 RX ORDER — ENOXAPARIN SODIUM 100 MG/ML
40 INJECTION SUBCUTANEOUS EVERY 24 HOURS
Status: DISCONTINUED | OUTPATIENT
Start: 2025-03-21 | End: 2025-03-21 | Stop reason: HOSPADM

## 2025-03-21 RX ORDER — ACETAMINOPHEN 500 MG
1000 TABLET ORAL EVERY 6 HOURS PRN
Status: DISCONTINUED | OUTPATIENT
Start: 2025-03-21 | End: 2025-03-21 | Stop reason: HOSPADM

## 2025-03-21 RX ORDER — IPRATROPIUM BROMIDE AND ALBUTEROL SULFATE 2.5; .5 MG/3ML; MG/3ML
3 SOLUTION RESPIRATORY (INHALATION) EVERY 4 HOURS PRN
Status: DISCONTINUED | OUTPATIENT
Start: 2025-03-21 | End: 2025-03-22

## 2025-03-21 RX ORDER — AMOXICILLIN 250 MG
1 CAPSULE ORAL 2 TIMES DAILY PRN
Status: DISCONTINUED | OUTPATIENT
Start: 2025-03-21 | End: 2025-03-21 | Stop reason: HOSPADM

## 2025-03-21 RX ORDER — BISACODYL 10 MG/1
10 SUPPOSITORY RECTAL DAILY PRN
Status: DISCONTINUED | OUTPATIENT
Start: 2025-03-21 | End: 2025-03-21 | Stop reason: HOSPADM

## 2025-03-21 RX ORDER — MAGNESIUM SULFATE 1 G/100ML
1 INJECTION INTRAVENOUS ONCE
Status: COMPLETED | OUTPATIENT
Start: 2025-03-21 | End: 2025-03-21

## 2025-03-21 RX ORDER — METHYLPREDNISOLONE SOD SUCC 125 MG
125 VIAL (EA) INJECTION
Status: COMPLETED | OUTPATIENT
Start: 2025-03-21 | End: 2025-03-21

## 2025-03-21 RX ORDER — AZITHROMYCIN 250 MG/1
250 TABLET, FILM COATED ORAL DAILY
Status: DISCONTINUED | OUTPATIENT
Start: 2025-03-22 | End: 2025-03-23 | Stop reason: HOSPADM

## 2025-03-21 RX ORDER — IBUPROFEN 200 MG
16 TABLET ORAL
Status: DISCONTINUED | OUTPATIENT
Start: 2025-03-21 | End: 2025-03-21 | Stop reason: HOSPADM

## 2025-03-21 RX ORDER — PROCHLORPERAZINE EDISYLATE 5 MG/ML
5 INJECTION INTRAMUSCULAR; INTRAVENOUS EVERY 6 HOURS PRN
Status: DISCONTINUED | OUTPATIENT
Start: 2025-03-21 | End: 2025-03-21 | Stop reason: HOSPADM

## 2025-03-21 RX ORDER — ENOXAPARIN SODIUM 100 MG/ML
30 INJECTION SUBCUTANEOUS EVERY 24 HOURS
Status: DISCONTINUED | OUTPATIENT
Start: 2025-03-22 | End: 2025-03-23 | Stop reason: HOSPADM

## 2025-03-21 RX ADMIN — CEFTRIAXONE SODIUM 2 G: 2 INJECTION, POWDER, FOR SOLUTION INTRAMUSCULAR; INTRAVENOUS at 12:03

## 2025-03-21 RX ADMIN — FAMOTIDINE 20 MG: 10 INJECTION, SOLUTION INTRAVENOUS at 09:03

## 2025-03-21 RX ADMIN — METHYLPREDNISOLONE SODIUM SUCCINATE 40 MG: 40 INJECTION, POWDER, FOR SOLUTION INTRAMUSCULAR; INTRAVENOUS at 09:03

## 2025-03-21 RX ADMIN — ALBUTEROL SULFATE 2.5 MG: 2.5 SOLUTION RESPIRATORY (INHALATION) at 06:03

## 2025-03-21 RX ADMIN — TRAZODONE HYDROCHLORIDE 100 MG: 50 TABLET ORAL at 09:03

## 2025-03-21 RX ADMIN — LORAZEPAM 1 MG: 2 INJECTION INTRAMUSCULAR; INTRAVENOUS at 12:03

## 2025-03-21 RX ADMIN — SODIUM CHLORIDE 1000 ML: 9 INJECTION, SOLUTION INTRAVENOUS at 07:03

## 2025-03-21 RX ADMIN — IOHEXOL 84 ML: 350 INJECTION, SOLUTION INTRAVENOUS at 06:03

## 2025-03-21 RX ADMIN — AZITHROMYCIN MONOHYDRATE 500 MG: 500 INJECTION, POWDER, LYOPHILIZED, FOR SOLUTION INTRAVENOUS at 09:03

## 2025-03-21 RX ADMIN — CEFTRIAXONE SODIUM 1 G: 1 INJECTION, POWDER, FOR SOLUTION INTRAMUSCULAR; INTRAVENOUS at 09:03

## 2025-03-21 RX ADMIN — GUAIFENESIN 600 MG: 600 TABLET, EXTENDED RELEASE ORAL at 09:03

## 2025-03-21 RX ADMIN — MAGNESIUM SULFATE IN DEXTROSE 1 G: 10 INJECTION, SOLUTION INTRAVENOUS at 05:03

## 2025-03-21 RX ADMIN — METHYLPREDNISOLONE SODIUM SUCCINATE 60 MG: 40 INJECTION, POWDER, FOR SOLUTION INTRAMUSCULAR; INTRAVENOUS at 08:03

## 2025-03-21 RX ADMIN — PIPERACILLIN SODIUM AND TAZOBACTAM SODIUM 4.5 G: 4; .5 INJECTION, POWDER, LYOPHILIZED, FOR SOLUTION INTRAVENOUS at 05:03

## 2025-03-21 RX ADMIN — CARVEDILOL 3.12 MG: 3.12 TABLET, FILM COATED ORAL at 09:03

## 2025-03-21 RX ADMIN — METHYLPREDNISOLONE SODIUM SUCCINATE 125 MG: 125 INJECTION, POWDER, FOR SOLUTION INTRAMUSCULAR; INTRAVENOUS at 05:03

## 2025-03-21 RX ADMIN — AZITHROMYCIN MONOHYDRATE 500 MG: 500 INJECTION, POWDER, LYOPHILIZED, FOR SOLUTION INTRAVENOUS at 01:03

## 2025-03-21 NOTE — ED PROVIDER NOTES
Encounter Date: 3/21/2025       History     Chief Complaint   Patient presents with    Shortness of Breath     SOB x3 days, increase thirst, and chills     HPI   79 year male here for sob. Was seen at other campus yesterday  and found to be hypoxic. He is breathless after ambulation and says sometimes legs give out b/c he becomes weak with ambulation. [facility] but left b/c he could not get water. Comes here today. No fever or chills  Review of patient's allergies indicates:   Allergen Reactions    Nitrofurantoin monohyd/m-cryst      Past Medical History:   Diagnosis Date    AAA (abdominal aortic aneurysm)     Abdominal aortic aneurysm, without rupture, unspecified     Acute bilateral low back pain without sciatica     Aneurysm of descending thoracic aorta without rupture     Atherosclerosis of arteries of extremities     Benign prostatic hyperplasia without lower urinary tract symptoms     BPH (benign prostatic hyperplasia) 08/03/2022    Carotid artery stenosis     Chronic idiopathic constipation 08/03/2022    Compression fracture of L1 vertebra, initial encounter     COPD (chronic obstructive pulmonary disease)     Coronary artery disease involving native coronary artery of native heart without angina pectoris 08/03/2022    Disorder of kidney and ureter     Hyperlipidemia, unspecified     Impaired fasting glucose 08/03/2022    Leg weakness, bilateral     Mixed hyperlipidemia 08/03/2022    Neural foraminal stenosis of lumbar spine     Nonrheumatic mitral (valve) stenosis     Occlusion and stenosis of bilateral carotid arteries     Peripheral vascular disease, unspecified     Presence of other vascular implants and grafts     Primary insomnia     Raynaud disease 08/03/2022    S/P AAA repair 08/03/2022    Solitary pulmonary nodule     Spinal stenosis of lumbar region with neurogenic claudication     Spinal stenosis, lumbosacral region     Stage 3a chronic kidney disease       Past Surgical History:   Procedure Laterality Date    ABDOMINAL AORTIC ANEURYSM REPAIR  03/13/2020    Surgeon: Dr. Ovalles    AORTOGRAM W/ BLE RUNOFF Bilateral 05/30/2024    Mg Cullen MD    CARDIAC SURGERY  01/30/2014    CABG    COLONOSCOPY  11/12/2021    CARLOS MOHAMUD MD    HERNIA REPAIR Left 03/23/2016    PERIPHERAL ANGIOGRAPHY  12/18/2015    Femoral Popliteal revas w/stent    REPAIR OF HEART VALVE       Family History   Problem Relation Name Age of Onset    No Known Problems Mother      No Known Problems Father      No Known Problems Sister      No Known Problems Brother       Social History[1]  Review of Systems   Constitutional:  Positive for activity change.   HENT:  Positive for congestion.    Respiratory:  Positive for cough, shortness of breath and wheezing.    Cardiovascular: Negative.    Gastrointestinal: Negative.    Musculoskeletal: Negative.    Neurological:  Positive for weakness.   Psychiatric/Behavioral: Negative.     All other systems reviewed and are negative.      Physical Exam     Initial Vitals   BP Pulse Resp Temp SpO2   03/21/25 1710 03/21/25 1712 03/21/25 1710 03/21/25 1710 03/21/25 1819   137/71 105 (!) 22 98.1 °F (36.7 °C) 98 %      MAP       --                Physical Exam    Nursing note and vitals reviewed.  Constitutional:   Thin white male   HENT:   Head: Normocephalic and atraumatic.   Nose: Nose normal. Mouth/Throat: Oropharynx is clear and moist.   Eyes: EOM are normal. Pupils are equal, round, and reactive to light.   Neck: Neck supple.   Normal range of motion.  Cardiovascular:  Normal rate, regular rhythm and normal heart sounds.           Pulmonary/Chest: He has wheezes. He has rhonchi.   Musculoskeletal:         General: Normal range of motion.      Cervical back: Normal range of motion and neck supple.     Neurological: He is alert and oriented to person, place, and time.   Skin: Skin is warm and dry. Capillary refill takes less than 2 seconds.   Psychiatric: He has a  normal mood and affect.         ED Course   Procedures  Labs Reviewed   COMPREHENSIVE METABOLIC PANEL - Abnormal       Result Value    Sodium 141      Potassium 3.9      Chloride 104      CO2 24      Glucose 197 (*)     Blood Urea Nitrogen 28.1 (*)     Creatinine 1.45 (*)     Calcium 9.6      Protein Total 7.6      Albumin 3.8      Globulin 3.8 (*)     Albumin/Globulin Ratio 1.0 (*)     Bilirubin Total 0.3      ALP 70      ALT 23      AST 22      eGFR 50      Anion Gap 13.0      BUN/Creatinine Ratio 19     CBC WITH DIFFERENTIAL - Abnormal    WBC 20.18 (*)     RBC 4.52 (*)     Hgb 13.8 (*)     Hct 41.0 (*)     MCV 90.7      MCH 30.5      MCHC 33.7      RDW 13.6      Platelet 208      MPV 9.9      Neut % 90.8      Lymph % 4.6      Mono % 4.2      Eos % 0.0      Basophil % 0.1      Imm Grans % 0.3      Neut # 18.30 (*)     Lymph # 0.93      Mono # 0.85      Eos # 0.00      Baso # 0.03      Imm Gran # 0.07 (*)     NRBC% 0.0     CBC W/ AUTO DIFFERENTIAL    Narrative:     The following orders were created for panel order CBC auto differential.  Procedure                               Abnormality         Status                     ---------                               -----------         ------                     CBC with Differential[[id number]]       Abnormal            Final result                 Please view results for these tests on the individual orders.        ECG Results    None       Imaging Results    None          Medications   methylPREDNISolone sodium succinate injection 125 mg (125 mg Intravenous Given 3/21/25 1746)   albuterol nebulizer solution 2.5 mg (2.5 mg Nebulization Given 3/21/25 1819)   magnesium sulfate in dextrose IVPB (premix) 1 g (0 g Intravenous Stopped 3/21/25 1847)   piperacillin-tazobactam (ZOSYN) 4.5 g in D5W 100 mL IVPB (MB+) (0 g Intravenous Stopped 3/21/25 1816)   azithromycin (ZITHROMAX) 500 mg in 0.9% NaCl 250 mL IVPB (admixture device) (0 mg Intravenous Stopped 3/21/25 2234)   0.9%  NaCl infusion (1,000 mLs Intravenous New Bag 3/21/25 1934)     Medical Decision Making  Differential would be copd exacerbation vs pneumonia vs PE  I reviewed his chart from yesterday and he had a CTA which showed infiltrates but no PE  He was hypoxic on arrival with EMS before being put on oxygen  His labs today are stable but I am going to see about admitting him here  He is a  smoker but uses inhalers at home  Lab work today shows elevated WBC compared to yesterday but he was given steroids  I spoke with dr mcneal who agreed to admit patient to [facility] as he is in no distress  Vitals are stable on arrival here other than slight tachypnea    Amount and/or Complexity of Data Reviewed  Labs: ordered. Decision-making details documented in ED Course.    Risk  Prescription drug management.               ED Course as of 03/25/25 1711   Fri Mar 21, 2025   1715 Here b'c sob and weak - seen at [facility] but left b/c he could not have water. [LG]      ED Course User Index  [LG] Chiquita Putnam DO          Upon further review of films done yesterday I do not see a pneumonia -however, with his tachypnea and  2 day ER visit I am going to admit                  Clinical Impression: copd exacerbation   Final diagnoses:  [J44.1] COPD exacerbation (Primary)  [R06.02] SOB (shortness of breath)          ED Disposition Condition    Observation Stable                  Chiquita Putnam DO  25 190         [1]   Social History  Tobacco Use    Smoking status: Former     Current packs/day: 0.00     Average packs/day: 0.9 packs/day for 58.5 years (54.6 ttl pk-yrs)     Types: Cigarettes     Start date:      Quit date: 10/4/2024     Years since quittin.4     Passive exposure: Past    Smokeless tobacco: Never    Tobacco comments:     Quit 10/4/24   Substance Use Topics    Alcohol use: Never    Drug use: Never        Chiquita Putnam DO  25

## 2025-03-21 NOTE — H&P
[facility] Hospital Medicine History & Physical Examination       Patient Name: Gustavo Lanza  MRN: [id number]  Patient Class: IP- Inpatient   Admission Date: 03/21/2025   Admitting Service: Hospital Medicine   Length of Stay: 0  Attending Physician: Mili Farfan MD   Primary Care Provider: Gabino Pimentel MD  Face-to-Face encounter date: 03/21/2025  Code Status: DNR  Chief Complaint: Shortness of Breath (EMS states pt c/o SOB with cough for the last few days and worse today. Initial O2 sat 80's on room air, currently 93% on 4 L. Hx of COPD)      Screening for Social Drivers for health:  Patient screened for food insecurity, housing instability, transportation needs, utility difficulties, and interpersonal safety (select all that apply as identified as concern)  []Housing or Food  []Transportation Needs  []Utility Difficulties  []Interpersonal safety  [x]None    Patient information was obtained from patient, patient's family, past medical records and ER records.      HISTORY OF PRESENT ILLNESS:   Gustavo Lanza is a 79 y.o. male with a past medical history of hypertension, hyperlipidemia, CAD, AAA, carotid artery stenosis, PVD, CKD stage III, DM, BPH, Raynaud's disease, spinal stenosis, and insomnia who presented to [facility] on 3/20/2025 via EMS for shortness of breath.  Patient reported shortness of breath the past 3 days.  Patient denied cough, fever, chills, and chest pain. EMS reported patient was hypoxic upon arrival with oxygen saturation in the 80s on room air.  Patient was placed on 4 L nasal cannula.  Initial vital signs in ED were /52, pulse 87, respirations 22, temperature 36° C, and SpO2 94% on 4 L nasal cannula. Labs revealed WBC 17.74, chloride 110, glucose 135, undetectable troponin, and .2.  COVID, flu, and RSV testing were negative.  Blood cultures were obtained.  Chest x-ray interpreted by ED physician was concerning for early infiltrates.   CTA was negative for PE.  Patient was given DuoNeb treatment, IV Zithromax 500 mg, and IV Rocephin 2 g in ED. Initial ABG revealed pH 7.160, pCO2 83, PO2 65, and P HC03 29.6.  Patient was placed on BiPAP with improvement of ABG to pH 7.3, pCO2 52, and PO2 84. Patient was admitted to hospital medicine service for further medical management.     PAST MEDICAL HISTORY:     Past Medical History:   Diagnosis Date    AAA (abdominal aortic aneurysm)     Abdominal aortic aneurysm, without rupture, unspecified     Acute bilateral low back pain without sciatica     Aneurysm of descending thoracic aorta without rupture     Atherosclerosis of arteries of extremities     Benign prostatic hyperplasia without lower urinary tract symptoms     BPH (benign prostatic hyperplasia) 08/03/2022    Carotid artery stenosis     Chronic idiopathic constipation 08/03/2022    Compression fracture of L1 vertebra, initial encounter     COPD (chronic obstructive pulmonary disease)     Coronary artery disease involving native coronary artery of native heart without angina pectoris 08/03/2022    Disorder of kidney and ureter     Hyperlipidemia, unspecified     Impaired fasting glucose 08/03/2022    Leg weakness, bilateral     Mixed hyperlipidemia 08/03/2022    Neural foraminal stenosis of lumbar spine     Nonrheumatic mitral (valve) stenosis     Occlusion and stenosis of bilateral carotid arteries     Peripheral vascular disease, unspecified     Presence of other vascular implants and grafts     Primary insomnia     Raynaud disease 08/03/2022    S/P AAA repair 08/03/2022    Solitary pulmonary nodule     Spinal stenosis of lumbar region with neurogenic claudication     Spinal stenosis, lumbosacral region     Stage 3a chronic kidney disease        PAST SURGICAL HISTORY:     Past Surgical History:   Procedure Laterality Date    ABDOMINAL AORTIC ANEURYSM REPAIR  03/13/2020    Surgeon: Dr. Ovalles    AORTOGRAM W/ BLE RUNOFF Bilateral 05/30/2024    Mg  MD Alyse    CARDIAC SURGERY  01/30/2014    CABG    COLONOSCOPY  11/12/2021    CARLOS MOHAMUD MD    HERNIA REPAIR Left 03/23/2016    PERIPHERAL ANGIOGRAPHY  12/18/2015    Femoral Popliteal revas w/stent    REPAIR OF HEART VALVE         FAMILY HISTORY:   Reviewed and negative    SOCIAL HISTORY:   Former tobacco use   Denied alcohol and illicit drug use    ALLERGIES:   Nitrofurantoin monohyd/m-cryst    HOME MEDICATIONS:     Prior to Admission medications    Medication Sig Start Date End Date Taking? Authorizing Provider   albuterol (PROVENTIL/VENTOLIN HFA) 90 mcg/actuation inhaler Inhale 2 puffs into the lungs every 6 (six) hours as needed for Wheezing or Shortness of Breath. 5/1/24   Gabino Pimentel MD   albuterol-ipratropium (DUO-NEB) 2.5 mg-0.5 mg/3 mL nebulizer solution Take 3 mLs by nebulization every 6 (six) hours as needed for Wheezing. Rescue 9/25/23 1/14/25  Kayli Dorantes MD   alendronate (FOSAMAX) 70 MG tablet Take 1 tablet (70 mg total) by mouth every 7 days. Must be taken with 6-8 oz of water. Do not eat or take other medications for 30 minutes. Do not lay down for 30 minutes after taking.  Patient not taking: Reported on 3/18/2025 2/6/25 2/6/26  Gabino Pimentel MD   aspirin (ECOTRIN) 81 MG EC tablet Take 81 mg by mouth once daily.  Patient not taking: Reported on 3/18/2025    Provider, Historical   carvediloL (COREG) 6.25 MG tablet Take 0.5 tablets (3.125 mg total) by mouth 2 (two) times daily. 8/26/24 2/22/25  Kurt Howard, MAGALIP   donepeziL (ARICEPT) 10 MG tablet Take 1 tablet (10 mg total) by mouth every evening. 2/6/25 2/6/26  Gabino Pimentel MD   finasteride (PROSCAR) 5 mg tablet Take 1 tablet (5 mg total) by mouth once daily. 1/13/25 1/13/26  Gabino Pimentel MD   levocetirizine (XYZAL) 5 MG tablet Take 1 tablet by mouth once daily. 1/7/25 1/17/25  Provider, Historical   multivitamin (THERAGRAN) per tablet Take 1 tablet by mouth once daily.  Patient not taking: Reported on  3/18/2025    Provider, Historical   nicotine polacrilex 2 MG Lozg Take 1 lozenge (2 mg total) by mouth as needed (Do not exceed more than 10 pieces in 24 hours).  Patient not taking: Reported on 3/18/2025 3/13/25   Steve العلي MD   rosuvastatin (CRESTOR) 10 MG tablet  11/30/24   Provider, Historical   sulfamethoxazole-trimethoprim 800-160mg (BACTRIM DS) 800-160 mg Tab Take 1 tablet by mouth 2 (two) times daily. 3/20/25   Gabino Pimentel MD   tamsulosin (FLOMAX) 0.4 mg Cap Take 0.4 mg by mouth once daily.    Provider, Historical   traZODone (DESYREL) 150 MG tablet TAKE 1 TABLET(150 MG) BY MOUTH EVERY EVENING 3/20/25   Gabino Pimentel MD   TRUE METRIX GLUCOSE TEST STRIP Strp TEST BLOOD SUGAR EVERY DAY 12/17/24   Gabino Pimentel MD   TRUEPLUS LANCETS 33 gauge Misc TEST BLOOD SUGAR EVERY DAY 12/17/24   Gabino Pimentel MD   UNABLE TO FIND Suppositories for constipation    Provider, Historical     ________________________________________________________________________  INPATIENT LIST OF MEDICATIONS   Current Medications[1]    Scheduled Meds:   [START ON 3/22/2025] azithromycin  500 mg Intravenous Q24H    [START ON 3/22/2025] cefTRIAXone (Rocephin) IV (PEDS and ADULTS)  1 g Intravenous Q24H    enoxparin  40 mg Subcutaneous Daily    methylPREDNISolone injection (PEDS and ADULTS)  60 mg Intravenous TID     Continuous Infusions:  PRN Meds:.  Current Facility-Administered Medications:     acetaminophen, 1,000 mg, Oral, Q6H PRN    albuterol-ipratropium, 3 mL, Nebulization, Q4H PRN    aluminum-magnesium hydroxide-simethicone, 30 mL, Oral, QID PRN    benzonatate, 200 mg, Oral, TID PRN    bisacodyL, 10 mg, Rectal, Daily PRN    dextromethorphan-guaiFENesin  mg/5 ml, 5 mL, Oral, Q4H PRN    dextrose 50%, 12.5 g, Intravenous, PRN    dextrose 50%, 25 g, Intravenous, PRN    glucagon (human recombinant), 1 mg, Intramuscular, PRN    glucose, 16 g, Oral, PRN    glucose, 24 g, Oral, PRN    insulin aspart  U-100, 0-10 Units, Subcutaneous, QID (AC + HS) PRN    melatonin, 6 mg, Oral, Nightly PRN    naloxone, 0.02 mg, Intravenous, PRN    ondansetron, 4 mg, Intravenous, Q4H PRN    prochlorperazine, 5 mg, Intravenous, Q6H PRN    senna-docusate 8.6-50 mg, 1 tablet, Oral, BID PRN    sodium chloride 0.9%, 10 mL, Intravenous, PRN      REVIEW OF SYSTEMS:   Except as documented, all other systems reviewed and negative.    PHYSICAL EXAM:     VITAL SIGNS: 24 HRS MIN & MAX LAST   Temp  Min: 96.8 °F (36 °C)  Max: 98.1 °F (36.7 °C) 98.1 °F (36.7 °C)   BP  Min: 97/50  Max: 165/52 (!) 148/71   Pulse  Min: 57  Max: 79  65   Resp  Min: 15  Max: 23 20   SpO2  Min: 93 %  Max: 100 % 100 %       General appearance:  Thin male in no apparent distress.  HENT: Atraumatic head.   Eyes: Normal extraocular movements.   Neck: Supple.   Lungs:  Coarse breath sounds bilaterally.  Supplemental oxygen in place via OxyMask 5 L  Heart: Regular rate and rhythm.  Abdomen: Soft, non-distended, non-tender.  Skin: No Rash.   Neuro: Awake, alert, and oriented. Motor and sensory exams grossly intact.   Psych/mental status:  Agitated, requesting to leave AMA    LABS AND IMAGING:     Recent Labs   Lab 03/18/25  1038 03/20/25 2243 03/21/25  0516   WBC 8.46 17.74* 15.00*   RBC 4.64* 4.71 4.71   HGB 14.2 14.3 14.1   HCT 43.0 43.4 43.0   MCV 92.7 92.1 91.3   MCH 30.6 30.4 29.9   MCHC 33.0 32.9* 32.8*   RDW 13.8 13.4 14.2    169 176   MPV 10.2 10.2 9.8       Recent Labs   Lab 03/18/25  1038 03/20/25  2243 03/20/25  2253 03/21/25  0045 03/21/25  0628    142  --   --  138   K 4.7 5.1  --   --  4.5    110*  --   --  103   CO2 29 23  --   --  25   BUN 23.0 19.8  --   --  19.2   CREATININE 1.18 1.08  --   --  1.17   CALCIUM 8.8 8.9  --   --  8.9   PH  --   --  7.160* 7.330*  --    MG  --   --   --   --  2.20   ALBUMIN 3.9 3.8  --   --  3.7   ALKPHOS 72 77  --   --  75   ALT <25 30  --   --  28   AST 20 50*  --   --  29   BILITOT 0.6 0.4  --   --  0.4        Microbiology Results (last 7 days)       Procedure Component Value Units Date/Time    Blood culture #1 **CANNOT BE ORDERED STAT** [[id number]] Collected: 03/21/25 0013    Order Status: Resulted Specimen: Blood Updated: 03/21/25 0016    Blood culture #2 **CANNOT BE ORDERED STAT** [[id number]] Collected: 03/21/25 0013    Order Status: Resulted Specimen: Blood Updated: 03/21/25 0016             CTA Chest Non-Coronary (PE Studies)  Narrative: EXAMINATION:  CTA CHEST NON CORONARY (PE STUDIES)    CLINICAL HISTORY:  Pulmonary embolism (PE) suspected, positive D-dimer;.  Short of breath.    TECHNIQUE:  Helical acquisition through the chest with IV contrast targeting the pulmonary arteries. Multiplanar and 3D MIP reconstructed images were provided for review.   mGycm. Automatic exposure control, adjustment of mA/kV or iterative reconstruction technique was used to reduce radiation.    COMPARISON:  14 February 2025.    FINDINGS:  There is good opacification of the pulmonary arterial tree. There is no evidence of pulmonary thromboembolism.  There is no aortic dissection.    There is no mediastinal, hilar or axillary lymphadenopathy by size criteria.    Heart is mildly enlarged.  No pericardial effusion.  There are coronary artery calcifications.  There are changes of CABG.    No pleural effusion.  Moderate emphysema.  New opacities suspicious for pneumonia.  Similar scarring in the lingula.    No acute findings in the imaged upper abdomen.  No acute osseous findings.  Impression: Negative for pulmonary thromboembolic disease.  No acute findings in the chest.    Electronically signed by: Kong Davalos  Date:    03/21/2025  Time:    06:50        ASSESSMENT & PLAN:   Assessment:   Acute on chronic hypercapnic and hypoxic systemic respiratory failure requiring BiPAP, now on OxyMask 5 L  COPD exacerbation  Leukocytosis  UTI-POA  Elevated D-dimer  History of hypertension, hyperlipidemia, CAD, AAA, carotid artery  stenosis, PVD, CKD stage III, DM, BPH, Raynaud's disease, spinal stenosis, and insomnia       Plan:  Continue supplemental oxygen, wean as tolerated  IV Rocephin and Azithromycin   Blood cultures x2 obtained, follow-up results   Flu, COVID, and RSV testing negative   Respiratory panel   MRSA PCR  PRN antitussives   IV Solu-Medrol 60 mg TID  CTA negative for PE   Venous ultrasound bilateral lower extremities ordered  Resume home medications as deemed appropriate once medication reconciliation is updated  Labs in AM    VTE Prophylaxis:  Lovenox    Patient's code status was discussed in full with patient-patient elected DNR, which was witnessed by patient's nurse DARSHAN Farris.  Patient was also agitated and requesting to leave AMA.  Discussed with patient risk of leaving AMA including death.  Attending MD aware.        Discharge Planning and Disposition: TBD    TAWNYA, Ramos Hughes PA-C have reviewed and discussed the case with Mili Farfan MD   Please see the attending MD's addendum for further assessment and plan.    Ramos Hughes PA-C  Department of Hospital Medicine   [facility]   03/21/2025    This note was created with the assistance of [employer] voice recognition software. There may be transcription errors as a result of using this technology, however minimal. Effort has been made to assure accuracy of transcription, but any obvious errors or omissions should be clarified with the author of the document.    _______________________________________________________________________________  MD Addendum:  I, Dr. Farfan , assumed care of this patient   For the patient encounter, I performed the substantive portion of the visit, I reviewed the NP/PA documentation, treatment plan, and medical decision making.  I had face to face time with this patient     Agree with the above history and exam, and management orders placed by the nurse practitioner/PA    Patient decided to leave against medical  advice despite educating.  Patient is A and O x3 and has capacity to make decisions.  Signed AMA paperwork saying that his ex-wife is going to come and became up, left against medical advice      03/21/2025          [1]   Current Facility-Administered Medications:     acetaminophen tablet 1,000 mg, 1,000 mg, Oral, Q6H PRN, Abbie Galaviz, MAGALIP    albuterol-ipratropium 2.5 mg-0.5 mg/3 mL nebulizer solution 3 mL, 3 mL, Nebulization, Q4H PRN, Abbie Galaviz FNP    aluminum-magnesium hydroxide-simethicone 200-200-20 mg/5 mL suspension 30 mL, 30 mL, Oral, QID PRN, Abbie Galaviz FNP    [START ON 3/22/2025] azithromycin (ZITHROMAX) 500 mg in 0.9% NaCl 250 mL IVPB (admixture device), 500 mg, Intravenous, Q24H, Abbie Galaviz FNP    benzonatate capsule 200 mg, 200 mg, Oral, TID PRN, Abbie Galaviz FNP    bisacodyL suppository 10 mg, 10 mg, Rectal, Daily PRN, Abbie Galaviz FNP    [START ON 3/22/2025] cefTRIAXone injection 1 g, 1 g, Intravenous, Q24H, Abbie Galaviz, MAGALIP    dextromethorphan-guaiFENesin  mg/5 ml liquid 5 mL, 5 mL, Oral, Q4H PRN, Abbie Galaviz FNP    dextrose 50% injection 12.5 g, 12.5 g, Intravenous, PRN, Abbie Galaviz FNP    dextrose 50% injection 25 g, 25 g, Intravenous, PRN, Abbie Galaviz FNP    enoxaparin injection 40 mg, 40 mg, Subcutaneous, Daily, Abbie Galaviz FNP    glucagon (human recombinant) injection 1 mg, 1 mg, Intramuscular, PRN, Abbie Galaviz, MAGALIP    glucose chewable tablet 16 g, 16 g, Oral, PRN, Abbie Galaviz FNP    glucose chewable tablet 24 g, 24 g, Oral, PRN, Abbie Galaviz FNP    insulin aspart U-100 injection 0-10 Units, 0-10 Units, Subcutaneous, QID (AC + HS) PRN, Abbie Galaviz FNP    melatonin tablet 6 mg, 6 mg, Oral, Nightly PRN, Abbie Galaviz FNP    methylPREDNISolone sodium succinate injection 60 mg, 60 mg, Intravenous, TID, Abbie Galaviz FNP    naloxone 0.4 mg/mL injection 0.02 mg, 0.02 mg, Intravenous,  PRN, Abbie Galaviz L, FNP    ondansetron injection 4 mg, 4 mg, Intravenous, Q4H PRN, Abbie Galaviz, FNP    prochlorperazine injection Soln 5 mg, 5 mg, Intravenous, Q6H PRN, Abbie Galaviz, FNP    senna-docusate 8.6-50 mg per tablet 1 tablet, 1 tablet, Oral, BID PRN, Abbie Galaviz, FNP    sodium chloride 0.9% flush 10 mL, 10 mL, Intravenous, PRN, Abbie Galaviz, FNP    Current Outpatient Medications:     albuterol (PROVENTIL/VENTOLIN HFA) 90 mcg/actuation inhaler, Inhale 2 puffs into the lungs every 6 (six) hours as needed for Wheezing or Shortness of Breath., Disp: 18 g, Rfl: 5    albuterol-ipratropium (DUO-NEB) 2.5 mg-0.5 mg/3 mL nebulizer solution, Take 3 mLs by nebulization every 6 (six) hours as needed for Wheezing. Rescue, Disp: 75 mL, Rfl: 2    alendronate (FOSAMAX) 70 MG tablet, Take 1 tablet (70 mg total) by mouth every 7 days. Must be taken with 6-8 oz of water. Do not eat or take other medications for 30 minutes. Do not lay down for 30 minutes after taking. (Patient not taking: Reported on 3/18/2025), Disp: 12 tablet, Rfl: 3    aspirin (ECOTRIN) 81 MG EC tablet, Take 81 mg by mouth once daily. (Patient not taking: Reported on 3/18/2025), Disp: , Rfl:     carvediloL (COREG) 6.25 MG tablet, Take 0.5 tablets (3.125 mg total) by mouth 2 (two) times daily., Disp: 90 tablet, Rfl: 1    donepeziL (ARICEPT) 10 MG tablet, Take 1 tablet (10 mg total) by mouth every evening., Disp: 90 tablet, Rfl: 3    finasteride (PROSCAR) 5 mg tablet, Take 1 tablet (5 mg total) by mouth once daily., Disp: 90 tablet, Rfl: 3    levocetirizine (XYZAL) 5 MG tablet, Take 1 tablet by mouth once daily., Disp: , Rfl:     multivitamin (THERAGRAN) per tablet, Take 1 tablet by mouth once daily. (Patient not taking: Reported on 3/18/2025), Disp: , Rfl:     nicotine polacrilex 2 MG Lozg, Take 1 lozenge (2 mg total) by mouth as needed (Do not exceed more than 10 pieces in 24 hours). (Patient not taking: Reported on 3/18/2025),  Disp: 216 lozenge, Rfl: 0    rosuvastatin (CRESTOR) 10 MG tablet, , Disp: , Rfl:     sulfamethoxazole-trimethoprim 800-160mg (BACTRIM DS) 800-160 mg Tab, Take 1 tablet by mouth 2 (two) times daily., Disp: 10 tablet, Rfl: 0    tamsulosin (FLOMAX) 0.4 mg Cap, Take 0.4 mg by mouth once daily., Disp: , Rfl:     traZODone (DESYREL) 150 MG tablet, TAKE 1 TABLET(150 MG) BY MOUTH EVERY EVENING, Disp: 90 tablet, Rfl: 3    TRUE METRIX GLUCOSE TEST STRIP Strp, TEST BLOOD SUGAR EVERY DAY, Disp: 100 strip, Rfl: 3    TRUEPLUS LANCETS 33 gauge Misc, TEST BLOOD SUGAR EVERY DAY, Disp: 100 each, Rfl: 3    UNABLE TO FIND, Suppositories for constipation, Disp: , Rfl:

## 2025-03-21 NOTE — CARE UPDATE
Patient wanted to leave AMA, patient was seen, refused to stay for further treatment, alert and oriented x3, understands the risks including death.  See RN's note for further details.

## 2025-03-21 NOTE — ED NOTES
"Dr. Farfan at bedside. Pt states he would like to leave AMA. Naheed MCKENZIE educated patient on risks of leaving and need for oxygen and IV antibiotics. Pt states he understands risks, including death. Pt states he does not want to be touched by staff. Pt states if he falls "the ground will catch me" and states if he does fall , not to pick him up. Pt states "there is more life to live outside of the hospital". Pt signed AMA forms. IVs removed. Pt states ex wife is on the way to pick him.  Pt refuses to given name of ex wife. AMA form scanned into chart.  "

## 2025-03-21 NOTE — ED PROVIDER NOTES
Encounter Date: 3/20/2025    SCRIBE #1 NOTE: I, Mykel Montague, am scribing for, and in the presence of,  Edwar Rodriguez DO. I have scribed the following portions of the note - the EKG reading. Other sections scribed: HPI, ROS, PE.       History     Chief Complaint   Patient presents with    Shortness of Breath     EMS states pt c/o SOB with cough for the last few days and worse today. Initial O2 sat 80's on room air, currently 93% on 4 L. Hx of COPD     Patient is a 79 y.o. male with a PMHx of AAA, BPH, carotid artery stenosis, COPD, PVD, CKD stage 3, HLD, Raynaud disease, and spinal stenosis presenting to the ED with SOB that onset 3 days ago. EMS reports the patient has been SOB with a cough for the last few days and is worse today. Patient's initial O2 sat was in the 80's RA, and is currently 93% on 4 L. Patient reportedly stopped talking with EMS and is shaking his head yes or no to questions in the room. Patient shook his head no when asked if he was having chest pain.     The history is provided by the EMS personnel and the patient. The history is limited by the condition of the patient. No  was used.     Review of patient's allergies indicates:   Allergen Reactions    Nitrofurantoin monohyd/m-cryst      Past Medical History:   Diagnosis Date    AAA (abdominal aortic aneurysm)     Abdominal aortic aneurysm, without rupture, unspecified     Acute bilateral low back pain without sciatica     Aneurysm of descending thoracic aorta without rupture     Atherosclerosis of arteries of extremities     Benign prostatic hyperplasia without lower urinary tract symptoms     BPH (benign prostatic hyperplasia) 08/03/2022    Carotid artery stenosis     Chronic idiopathic constipation 08/03/2022    Compression fracture of L1 vertebra, initial encounter     COPD (chronic obstructive pulmonary disease)     Coronary artery disease involving native coronary artery of native heart without angina pectoris  08/03/2022    Disorder of kidney and ureter     Hyperlipidemia, unspecified     Impaired fasting glucose 08/03/2022    Leg weakness, bilateral     Mixed hyperlipidemia 08/03/2022    Neural foraminal stenosis of lumbar spine     Nonrheumatic mitral (valve) stenosis     Occlusion and stenosis of bilateral carotid arteries     Peripheral vascular disease, unspecified     Presence of other vascular implants and grafts     Primary insomnia     Raynaud disease 08/03/2022    S/P AAA repair 08/03/2022    Solitary pulmonary nodule     Spinal stenosis of lumbar region with neurogenic claudication     Spinal stenosis, lumbosacral region     Stage 3a chronic kidney disease      Past Surgical History:   Procedure Laterality Date    ABDOMINAL AORTIC ANEURYSM REPAIR  03/13/2020    Surgeon: Dr. Ovalles    AORTOGRAM W/ BLE RUNOFF Bilateral 05/30/2024    Mg Cullen MD    CARDIAC SURGERY  01/30/2014    CABG    COLONOSCOPY  11/12/2021    CARLOS MOHAMUD MD    HERNIA REPAIR Left 03/23/2016    PERIPHERAL ANGIOGRAPHY  12/18/2015    Femoral Popliteal revas w/stent    REPAIR OF HEART VALVE       Family History   Problem Relation Name Age of Onset    No Known Problems Mother      No Known Problems Father      No Known Problems Sister      No Known Problems Brother       Social History[1]  Review of Systems   Constitutional:  Negative for chills and fever.   HENT:  Negative for congestion, rhinorrhea and sore throat.    Eyes:  Negative for visual disturbance.   Respiratory:  Positive for cough and shortness of breath.    Cardiovascular:  Negative for chest pain.   Gastrointestinal:  Negative for abdominal pain and nausea.   Endocrine: Negative for polyuria.   Genitourinary:  Negative for dysuria.   Musculoskeletal:  Negative for back pain.   Skin:  Negative for rash.   Allergic/Immunologic: Negative for immunocompromised state.   Neurological:  Negative for dizziness, seizures, weakness, light-headedness and headaches.   Hematological:  Does not  bruise/bleed easily.       Physical Exam     Initial Vitals [03/20/25 2220]   BP Pulse Resp Temp SpO2   (!) 165/52 (!) 57 (!) 22 96.8 °F (36 °C) (!) 94 %      MAP       --         Physical Exam    Nursing note and vitals reviewed.  Constitutional: He is not diaphoretic. He appears cachectic. He appears toxic.   Thin, shaking head yes or no in response to questions   HENT:   Head: Normocephalic and atraumatic.   Eyes: Conjunctivae and EOM are normal. Pupils are equal, round, and reactive to light.   Neck:   Normal range of motion.  Cardiovascular:  Normal rate, regular rhythm, normal heart sounds and intact distal pulses.           No murmur heard.  Pulmonary/Chest: He has no wheezes. He has no rales.   Belly breathing, minimal breath sounds present   Abdominal: Abdomen is soft. He exhibits no distension. There is no abdominal tenderness.   Musculoskeletal:         General: No tenderness or edema. Normal range of motion.      Cervical back: Normal range of motion.     Neurological: He is alert. No cranial nerve deficit.   Skin: Skin is warm and dry. Capillary refill takes less than 2 seconds. No rash noted. No erythema.   Psychiatric: He has a normal mood and affect.         ED Course   Critical Care    Date/Time: 3/21/2025 1:36 AM    Performed by: Edwar Rodriguez DO  Authorized by: Reyes, Thairy G, DO  Direct patient critical care time: 20 minutes  Additional history critical care time: 5 minutes  Ordering / reviewing critical care time: 5 minutes  Documentation critical care time: 5 minutes  Consulting other physicians critical care time: 5 minutes  Total critical care time (exclusive of procedural time) : 40 minutes  Critical care time was exclusive of separately billable procedures and treating other patients.  Critical care was necessary to treat or prevent imminent or life-threatening deterioration of the following conditions: respiratory failure.  Critical care was time spent personally by me on the  following activities: development of treatment plan with patient or surrogate, discussions with consultants, evaluation of patient's response to treatment, examination of patient, obtaining history from patient or surrogate, ordering and performing treatments and interventions, ordering and review of laboratory studies, ordering and review of radiographic studies, pulse oximetry, re-evaluation of patient's condition and review of old charts.        Labs Reviewed   COMPREHENSIVE METABOLIC PANEL - Abnormal       Result Value    Sodium 142      Potassium 5.1      Chloride 110 (*)     CO2 23      Glucose 135 (*)     Blood Urea Nitrogen 19.8      Creatinine 1.08      Calcium 8.9      Protein Total 7.6      Albumin 3.8      Globulin 3.8 (*)     Albumin/Globulin Ratio 1.0 (*)     Bilirubin Total 0.4      ALP 77      ALT 30      AST 50 (*)     eGFR >60      Anion Gap 9.0      BUN/Creatinine Ratio 18     CBC WITH DIFFERENTIAL - Abnormal    WBC 17.74 (*)     RBC 4.71      Hgb 14.3      Hct 43.4      MCV 92.1      MCH 30.4      MCHC 32.9 (*)     RDW 13.4      Platelet 169      MPV 10.2      IPF 5.6      Neut % 71.9      Lymph % 21.5      Mono % 3.8      Eos % 2.1      Basophil % 0.3      Imm Grans % 0.4      Neut # 12.74 (*)     Lymph # 3.82      Mono # 0.67      Eos # 0.38      Baso # 0.06      Imm Gran # 0.07 (*)     NRBC% 0.0     BLOOD GAS - Abnormal    Sample Type Arterial Blood      Sample site Left Radial Artery      Drawn by LADONNA ONEIL      pH, Blood gas 7.160 (*)     pCO2, Blood gas 83.0 (*)     pO2, Blood gas 65.0 (*)     Sodium, Blood Gas 139      Potassium, Blood Gas 3.7      Calcium Level Ionized 1.22      TOC2, Blood gas 32.1      Base Excess, Blood gas -1.40      sO2, Blood gas 85.6      HCO3, Blood gas 29.6 (*)     THb, Blood gas 13.8      O2 Hb, Blood Gas 86.9 (*)     CO Hgb 5.2 (*)     Met Hgb 0.7      Allens Test Yes      Oxygen Device, Blood gas Cannula      LPM 4     BLOOD GAS - Abnormal    Sample Type Arterial  Blood      Sample site Left Radial Artery      Drawn by LADONNA ONEIL      pH, Blood gas 7.330 (*)     pCO2, Blood gas 52.0 (*)     pO2, Blood gas 84.0      Sodium, Blood Gas 138      Potassium, Blood Gas 4.3      Calcium Level Ionized 1.17      TOC2, Blood gas 29.0      Base Excess, Blood gas 0.60      sO2, Blood gas 95.5      HCO3, Blood gas 27.4 (*)     THb, Blood gas 12.9      O2 Hb, Blood Gas 93.2 (*)     CO Hgb 3.8 (*)     Met Hgb 1.1      Allens Test Yes      MODE BiPAP      FIO2, Blood gas 0      IPAP 16      EPAP 6     TROPONIN I - Normal    Troponin-I <0.010     BLOOD CULTURE [facility]   BLOOD CULTURE [facility]   CBC W/ AUTO DIFFERENTIAL    Narrative:     The following orders were created for panel order CBC Auto Differential.  Procedure                               Abnormality         Status                     ---------                               -----------         ------                     CBC with Differential[[id number]]       Abnormal            Final result                 Please view results for these tests on the individual orders.   COVID/RSV/FLU A&B PCR   D DIMER, QUANTITATIVE   B-TYPE NATRIURETIC PEPTIDE     EKG Readings: (Independently Interpreted)   Initial Reading: No STEMI. Rhythm: Normal Sinus Rhythm. Heart Rate: 77. Ectopy: PACs PVCs Frequent. Conduction: Normal. ST Segments: Normal ST Segments. T Waves: Normal. Axis: Normal. Clinical Impression: Normal Sinus Rhythm with PACs and with PVCs   Done at 2240.        Imaging Results              X-Ray Chest 1 View (In process)                      Medications   azithromycin (ZITHROMAX) 500 mg in 0.9% NaCl 250 mL IVPB (admixture device) (500 mg Intravenous New Bag 3/21/25 0112)   albuterol-ipratropium 2.5 mg-0.5 mg/3 mL nebulizer solution 3 mL (3 mLs Nebulization Given 3/20/25 2308)   methylPREDNISolone sodium succinate injection 125 mg (125 mg Intravenous Given 3/20/25 2309)   LORazepam injection 1 mg (1 mg Intravenous Given 3/21/25 0036)   cefTRIAXone  injection 2 g (2 g Intravenous Given 3/21/25 0037)     Medical Decision Making  79-year-old male presenting with shortness of breath    Differential Diagnosis:   Judging by the patient's chief complaint and pertinent history, the patient has the following possible differential diagnoses, including but not limited to the following.  Some of these are deemed to be lower likelihood and some more likely based on my physical exam and history combined with possible lab work and/or imaging studies.   Please see the pertinent studies, and refer to the HPI.  Some of these diagnoses will take further evaluation to fully rule out, perhaps as an outpatient and the patient was encouraged to follow up when discharged for more comprehensive evaluation    COPD, asthma, pneumonia, viral syndrome, PE, pneumothorax, congestive heart failure, CAD, MI, pericarditis, anemia, electrolyte abnormality, hypotension, pleural effusion    Problems Addressed:  COPD exacerbation: acute illness or injury with systemic symptoms that poses a threat to life or bodily functions  Shortness of breath: acute illness or injury with systemic symptoms    Amount and/or Complexity of Data Reviewed  Independent Historian: EMS     Details: See [facility]  External Data Reviewed: labs, radiology, ECG and notes.  Labs: ordered. Decision-making details documented in ED Course.  Radiology: ordered and independent interpretation performed. Decision-making details documented in ED Course.  ECG/medicine tests: ordered and independent interpretation performed. Decision-making details documented in ED Course.     Details: No STEMI. Rhythm: Normal Sinus Rhythm. Heart Rate: 77. Ectopy: PACs PVCs Frequent. Conduction: Normal. ST Segments: Normal ST Segments. T Waves: Normal. Axis: Normal. Clinical Impression: Normal Sinus Rhythm with PACs and with PVCs   Done at 2240.    Risk  Prescription drug management.  Decision regarding hospitalization.            Scribe Attestation:    Scribe #1: I performed the above scribed service and the documentation accurately describes the services I performed. I attest to the accuracy of the note.    Attending Attestation:           Physician Attestation for Scribe:  Physician Attestation Statement for Scribe #1: I, Edwar Rodriguez DO, reviewed documentation, as scribed by Mykel Montague in my presence, and it is both accurate and complete.             ED Course as of 256   Fri Mar 21, 2025   0047 POC PH(!!): 7.160 [MM]   0047 POC PCO2(!!): 83.0 [MM]   0047 POC PO2(!): 65.0 [MM]   0047 Troponin I: <0.010 [MM]   0047 Sodium: 142 [MM]   0047 Potassium: 5.1 [MM]   0047 Chloride(!): 110 [MM]   0047 CO2: 23 [MM]   0047 Glucose(!): 135 [MM]   0047 BUN: 19.8 [MM]   0047 Creatinine: 1.08 [MM]   0047 WBC(!): 17.74 [MM]   0047 Hemoglobin: 14.3 [MM]   0047 Platelet Count: 169  On my independent interpretation, chest x-ray and possibly early infiltrate. [MM]   0047 Patient was placed on BiPAP with improvement of his breathing, he is speaking clearly now.  ABG repeat his currently in process.  Patient needs admission secondary to COPD exacerbation from pneumonia.  Hospitalist is paged. [MM]   0133 Case discussed with hospitalist, does accept patient for admission.  Patient was started on antibiotics for possible early pneumonia on chest x-ray causing COPD exacerbation. [MM]      ED Course User Index  [MM] Edwar Rodriguez DO                           Clinical Impression:  Final diagnoses:  [R06.02] Shortness of breath  [J44.1] COPD exacerbation (Primary)          ED Disposition Condition    Admit Stable                    [1]   Social History  Tobacco Use    Smoking status: Former     Current packs/day: 0.00     Average packs/day: 0.9 packs/day for 58.5 years (54.6 ttl pk-yrs)     Types: Cigarettes     Start date:      Quit date: 10/4/2024     Years since quittin.4     Passive exposure: Past    Smokeless tobacco: Never    Tobacco comments:      Quit 10/4/24   Substance Use Topics    Alcohol use: Never    Drug use: Never        Edwar Rodriguez,   03/21/25 0137

## 2025-03-22 LAB
ALBUMIN SERPL-MCNC: 3.1 G/DL (ref 3.4–4.8)
ALBUMIN/GLOB SERPL: 0.9 RATIO (ref 1.1–2)
ALP SERPL-CCNC: 57 UNIT/L (ref 40–150)
ALT SERPL-CCNC: 19 UNIT/L (ref 0–55)
ANION GAP SERPL CALC-SCNC: 9 MEQ/L
AST SERPL-CCNC: 18 UNIT/L (ref 11–45)
BASOPHILS # BLD AUTO: 0.02 X10(3)/MCL
BASOPHILS NFR BLD AUTO: 0.1 %
BILIRUB SERPL-MCNC: 0.3 MG/DL
BILIRUB UR QL STRIP.AUTO: NEGATIVE
BUN SERPL-MCNC: 25.1 MG/DL (ref 8.4–25.7)
CALCIUM SERPL-MCNC: 8.7 MG/DL (ref 8.8–10)
CHLORIDE SERPL-SCNC: 108 MMOL/L (ref 98–107)
CLARITY UR: CLEAR
CO2 SERPL-SCNC: 23 MMOL/L (ref 23–31)
COLOR UR AUTO: NORMAL
CREAT SERPL-MCNC: 1.26 MG/DL (ref 0.72–1.25)
CREAT/UREA NIT SERPL: 20
EOSINOPHIL # BLD AUTO: 0 X10(3)/MCL (ref 0–0.9)
EOSINOPHIL NFR BLD AUTO: 0 %
ERYTHROCYTE [DISTWIDTH] IN BLOOD BY AUTOMATED COUNT: 13.9 % (ref 11.5–17)
EST. AVERAGE GLUCOSE BLD GHB EST-MCNC: 125.5 MG/DL
GFR SERPLBLD CREATININE-BSD FMLA CKD-EPI: 59 ML/MIN/1.73/M2
GLOBULIN SER-MCNC: 3.4 GM/DL (ref 2.4–3.5)
GLUCOSE SERPL-MCNC: 171 MG/DL (ref 82–115)
GLUCOSE UR QL STRIP: NEGATIVE
HBA1C MFR BLD: 6 %
HCT VFR BLD AUTO: 38 % (ref 42–52)
HGB BLD-MCNC: 12.8 G/DL (ref 14–18)
HGB UR QL STRIP: NEGATIVE
IMM GRANULOCYTES # BLD AUTO: 0.13 X10(3)/MCL (ref 0–0.04)
IMM GRANULOCYTES NFR BLD AUTO: 0.6 %
KETONES UR QL STRIP: NEGATIVE
LEUKOCYTE ESTERASE UR QL STRIP: NEGATIVE
LYMPHOCYTES # BLD AUTO: 1.28 X10(3)/MCL (ref 0.6–4.6)
LYMPHOCYTES NFR BLD AUTO: 5.9 %
MCH RBC QN AUTO: 30.3 PG (ref 27–31)
MCHC RBC AUTO-ENTMCNC: 33.7 G/DL (ref 33–36)
MCV RBC AUTO: 89.8 FL (ref 80–94)
MONOCYTES # BLD AUTO: 0.58 X10(3)/MCL (ref 0.1–1.3)
MONOCYTES NFR BLD AUTO: 2.7 %
NEUTROPHILS # BLD AUTO: 19.61 X10(3)/MCL (ref 2.1–9.2)
NEUTROPHILS NFR BLD AUTO: 90.7 %
NITRITE UR QL STRIP: NEGATIVE
NRBC BLD AUTO-RTO: 0 %
PH UR STRIP: 5.5 [PH]
PLATELET # BLD AUTO: 182 X10(3)/MCL (ref 130–400)
PMV BLD AUTO: 10.3 FL (ref 7.4–10.4)
POCT GLUCOSE: 146 MG/DL (ref 70–110)
POCT GLUCOSE: 194 MG/DL (ref 70–110)
POTASSIUM SERPL-SCNC: 4.6 MMOL/L (ref 3.5–5.1)
PROT SERPL-MCNC: 6.5 GM/DL (ref 5.8–7.6)
PROT UR QL STRIP: NEGATIVE
RBC # BLD AUTO: 4.23 X10(6)/MCL (ref 4.7–6.1)
SODIUM SERPL-SCNC: 140 MMOL/L (ref 136–145)
SP GR UR STRIP.AUTO: 1.02 (ref 1–1.03)
UROBILINOGEN UR STRIP-ACNC: 0.2
WBC # BLD AUTO: 21.62 X10(3)/MCL (ref 4.5–11.5)

## 2025-03-22 PROCEDURE — 80053 COMPREHEN METABOLIC PANEL: CPT | Performed by: INTERNAL MEDICINE

## 2025-03-22 PROCEDURE — 63700000 PHARM REV CODE 250 ALT 637 W/O HCPCS: Performed by: INTERNAL MEDICINE

## 2025-03-22 PROCEDURE — 94640 AIRWAY INHALATION TREATMENT: CPT | Mod: XB

## 2025-03-22 PROCEDURE — 83036 HEMOGLOBIN GLYCOSYLATED A1C: CPT | Performed by: INTERNAL MEDICINE

## 2025-03-22 PROCEDURE — 25000003 PHARM REV CODE 250: Performed by: INTERNAL MEDICINE

## 2025-03-22 PROCEDURE — 63600175 PHARM REV CODE 636 W HCPCS: Performed by: INTERNAL MEDICINE

## 2025-03-22 PROCEDURE — 36415 COLL VENOUS BLD VENIPUNCTURE: CPT | Performed by: INTERNAL MEDICINE

## 2025-03-22 PROCEDURE — 85025 COMPLETE CBC W/AUTO DIFF WBC: CPT | Performed by: INTERNAL MEDICINE

## 2025-03-22 PROCEDURE — 96372 THER/PROPH/DIAG INJ SC/IM: CPT | Performed by: INTERNAL MEDICINE

## 2025-03-22 PROCEDURE — G0378 HOSPITAL OBSERVATION PER HR: HCPCS

## 2025-03-22 PROCEDURE — 97161 PT EVAL LOW COMPLEX 20 MIN: CPT

## 2025-03-22 PROCEDURE — 25000242 PHARM REV CODE 250 ALT 637 W/ HCPCS: Performed by: EMERGENCY MEDICINE

## 2025-03-22 PROCEDURE — 25000242 PHARM REV CODE 250 ALT 637 W/ HCPCS: Performed by: INTERNAL MEDICINE

## 2025-03-22 PROCEDURE — 99900031 HC PATIENT EDUCATION (STAT)

## 2025-03-22 PROCEDURE — 81003 URINALYSIS AUTO W/O SCOPE: CPT | Performed by: INTERNAL MEDICINE

## 2025-03-22 PROCEDURE — 94761 N-INVAS EAR/PLS OXIMETRY MLT: CPT

## 2025-03-22 PROCEDURE — 63600175 PHARM REV CODE 636 W HCPCS: Mod: JZ,TB | Performed by: EMERGENCY MEDICINE

## 2025-03-22 PROCEDURE — 94760 N-INVAS EAR/PLS OXIMETRY 1: CPT

## 2025-03-22 RX ORDER — IBUPROFEN 200 MG
16 TABLET ORAL
Status: DISCONTINUED | OUTPATIENT
Start: 2025-03-22 | End: 2025-03-23 | Stop reason: HOSPADM

## 2025-03-22 RX ORDER — IPRATROPIUM BROMIDE AND ALBUTEROL SULFATE 2.5; .5 MG/3ML; MG/3ML
3 SOLUTION RESPIRATORY (INHALATION)
Status: DISCONTINUED | OUTPATIENT
Start: 2025-03-22 | End: 2025-03-23 | Stop reason: HOSPADM

## 2025-03-22 RX ORDER — IBUPROFEN 200 MG
24 TABLET ORAL
Status: DISCONTINUED | OUTPATIENT
Start: 2025-03-22 | End: 2025-03-23 | Stop reason: HOSPADM

## 2025-03-22 RX ORDER — INSULIN ASPART 100 [IU]/ML
0-5 INJECTION, SOLUTION INTRAVENOUS; SUBCUTANEOUS
Status: DISCONTINUED | OUTPATIENT
Start: 2025-03-22 | End: 2025-03-23 | Stop reason: HOSPADM

## 2025-03-22 RX ORDER — BISACODYL 10 MG/1
10 SUPPOSITORY RECTAL DAILY PRN
Status: DISCONTINUED | OUTPATIENT
Start: 2025-03-22 | End: 2025-03-23 | Stop reason: HOSPADM

## 2025-03-22 RX ORDER — GLUCAGON 1 MG
1 KIT INJECTION
Status: DISCONTINUED | OUTPATIENT
Start: 2025-03-22 | End: 2025-03-23 | Stop reason: HOSPADM

## 2025-03-22 RX ADMIN — ENOXAPARIN SODIUM 30 MG: 30 INJECTION SUBCUTANEOUS at 04:03

## 2025-03-22 RX ADMIN — GUAIFENESIN 600 MG: 600 TABLET, EXTENDED RELEASE ORAL at 08:03

## 2025-03-22 RX ADMIN — METHYLPREDNISOLONE SODIUM SUCCINATE 40 MG: 40 INJECTION, POWDER, FOR SOLUTION INTRAMUSCULAR; INTRAVENOUS at 02:03

## 2025-03-22 RX ADMIN — CARVEDILOL 3.12 MG: 3.12 TABLET, FILM COATED ORAL at 08:03

## 2025-03-22 RX ADMIN — BISACODYL 10 MG: 10 SUPPOSITORY RECTAL at 05:03

## 2025-03-22 RX ADMIN — IPRATROPIUM BROMIDE AND ALBUTEROL SULFATE 3 ML: 2.5; .5 SOLUTION RESPIRATORY (INHALATION) at 01:03

## 2025-03-22 RX ADMIN — FINASTERIDE 5 MG: 5 TABLET, FILM COATED ORAL at 08:03

## 2025-03-22 RX ADMIN — ALBUTEROL SULFATE 2.5 MG: 2.5 SOLUTION RESPIRATORY (INHALATION) at 01:03

## 2025-03-22 RX ADMIN — ASPIRIN 81 MG: 81 TABLET, COATED ORAL at 08:03

## 2025-03-22 RX ADMIN — FAMOTIDINE 20 MG: 10 INJECTION, SOLUTION INTRAVENOUS at 08:03

## 2025-03-22 RX ADMIN — METHYLPREDNISOLONE SODIUM SUCCINATE 40 MG: 40 INJECTION, POWDER, FOR SOLUTION INTRAMUSCULAR; INTRAVENOUS at 06:03

## 2025-03-22 RX ADMIN — METHYLPREDNISOLONE SODIUM SUCCINATE 40 MG: 40 INJECTION, POWDER, FOR SOLUTION INTRAMUSCULAR; INTRAVENOUS at 09:03

## 2025-03-22 RX ADMIN — AZITHROMYCIN DIHYDRATE 250 MG: 250 TABLET ORAL at 08:03

## 2025-03-22 RX ADMIN — IPRATROPIUM BROMIDE AND ALBUTEROL SULFATE 3 ML: 2.5; .5 SOLUTION RESPIRATORY (INHALATION) at 08:03

## 2025-03-22 RX ADMIN — TRAZODONE HYDROCHLORIDE 100 MG: 50 TABLET ORAL at 08:03

## 2025-03-22 RX ADMIN — CEFTRIAXONE SODIUM 1 G: 1 INJECTION, POWDER, FOR SOLUTION INTRAMUSCULAR; INTRAVENOUS at 08:03

## 2025-03-22 RX ADMIN — ALBUTEROL SULFATE 2.5 MG: 2.5 SOLUTION RESPIRATORY (INHALATION) at 07:03

## 2025-03-22 NOTE — PLAN OF CARE
Problem: Pneumonia  Goal: Fluid Balance  Outcome: Progressing     Problem: Pneumonia  Goal: Resolution of Infection Signs and Symptoms  Outcome: Progressing     Problem: Pneumonia  Goal: Effective Oxygenation and Ventilation  Outcome: Progressing

## 2025-03-22 NOTE — PROGRESS NOTES
Pharmacist Renal Dose Adjustment Note    Gustavo Lanza is a 79 y.o. male being treated with the medication famotidine    Patient Data:    Vital Signs (Most Recent):  Temp: 97.7 °F (36.5 °C) (03/21/25 2000)  Pulse: 73 (03/21/25 2000)  Resp: (!) 24 (03/21/25 1901)  BP: (!) 115/59 (03/21/25 2000)  SpO2: (!) 93 % (03/21/25 2000) Vital Signs (72h Range):  Temp:  [96.8 °F (36 °C)-98.1 °F (36.7 °C)]   Pulse:  []   Resp:  [15-24]   BP: ()/(50-84)   SpO2:  [93 %-100 %]      Recent Labs   Lab 03/20/25  2243 03/21/25  0628 03/21/25  1728   CREATININE 1.08 1.17 1.45*     Serum creatinine: 1.45 mg/dL (H) 03/21/25 1728  Estimated creatinine clearance: 39.7 mL/min (A)    Famotidine 20 mg IV BID will be changed to famotidine 20 mg IV QD per pharmacy protocol    Pharmacist's Name: Ama Campos  Pharmacist's Extension: [phone]

## 2025-03-22 NOTE — HPI
79 year male with hx of HTN, BPH,  COPD, presents here for sob. Was seen at other campus yesterday and found to be hypoxic. He is breathless after ambulation and says sometimes legs give out b/c he becomes weak with ambulation. [facility] but left b/c he could not get water. Comes here today. No fever or chills, occ cough. Pt has quit smoking about a year ago

## 2025-03-22 NOTE — PLAN OF CARE
Problem: Adult Inpatient Plan of Care  Goal: Plan of Care Review  Outcome: Progressing  Goal: Patient-Specific Goal (Individualized)  Outcome: Progressing  Goal: Absence of Hospital-Acquired Illness or Injury  Outcome: Progressing  Goal: Optimal Comfort and Wellbeing  Outcome: Progressing  Goal: Readiness for Transition of Care  Outcome: Progressing     Problem: COPD (Chronic Obstructive Pulmonary Disease)  Goal: Optimal Chronic Illness Coping  Outcome: Progressing  Goal: Optimal Level of Functional [place]  Outcome: Progressing  Goal: Absence of Infection Signs and Symptoms  Outcome: Progressing  Goal: Improved Oral Intake  Outcome: Progressing  Goal: Effective Oxygenation and Ventilation  Outcome: Progressing     Problem: Fall Injury Risk  Goal: Absence of Fall and Fall-Related Injury  Outcome: Progressing     Problem: Infection  Goal: Absence of Infection Signs and Symptoms  Outcome: Progressing     Problem: Fatigue  Goal: Improved Activity Tolerance  Outcome: Progressing

## 2025-03-22 NOTE — PROGRESS NOTES
[facility] - Orthopaedics  [facility] Medicine  Progress Note    Patient Name: Gustavo Lanza  MRN: [id number]  Patient Class: OP- Observation   Admission Date: 3/21/2025  Length of Stay: 0 days  Attending Physician: Mau Oliva MD  Primary Care Provider: Gabino Pimentel MD        Subjective     Principal Problem:<principal problem not specified>        HPI:  79 year male with hx of HTN, BPH,  COPD, presents here for sob. Was seen at other campus yesterday and found to be hypoxic. He is breathless after ambulation and says sometimes legs give out b/c he becomes weak with ambulation. He was diagnosed with pneumonia yesterday at [facility] [place] but left b/c he could not get water. Comes here today. No fever or chills, occ cough. Pt has quit smoking about a year ago    Overview/Hospital Course:  No notes on file    Past Medical History:   Diagnosis Date    AAA (abdominal aortic aneurysm)     Abdominal aortic aneurysm, without rupture, unspecified     Acute bilateral low back pain without sciatica     Aneurysm of descending thoracic aorta without rupture     Atherosclerosis of arteries of extremities     Benign prostatic hyperplasia without lower urinary tract symptoms     BPH (benign prostatic hyperplasia) 08/03/2022    Carotid artery stenosis     Chronic idiopathic constipation 08/03/2022    Compression fracture of L1 vertebra, initial encounter     COPD (chronic obstructive pulmonary disease)     Coronary artery disease involving native coronary artery of native heart without angina pectoris 08/03/2022    Disorder of kidney and ureter     Hyperlipidemia, unspecified     Impaired fasting glucose 08/03/2022    Leg weakness, bilateral     Mixed hyperlipidemia 08/03/2022    Neural foraminal stenosis of lumbar spine     Nonrheumatic mitral (valve) stenosis     Occlusion and stenosis of bilateral carotid arteries     Peripheral vascular disease, unspecified     Presence of other vascular  implants and grafts     Primary insomnia     Raynaud disease 08/03/2022    S/P AAA repair 08/03/2022    Solitary pulmonary nodule     Spinal stenosis of lumbar region with neurogenic claudication     Spinal stenosis, lumbosacral region     Stage 3a chronic kidney disease        Past Surgical History:   Procedure Laterality Date    ABDOMINAL AORTIC ANEURYSM REPAIR  03/13/2020    Surgeon: Dr. Ovalles    AORTOGRAM W/ BLE RUNOFF Bilateral 05/30/2024    Mg Cullen MD    CARDIAC SURGERY  01/30/2014    CABG    COLONOSCOPY  11/12/2021    CARLOS MOHAMUD MD    HERNIA REPAIR Left 03/23/2016    PERIPHERAL ANGIOGRAPHY  12/18/2015    Femoral Popliteal revas w/stent    REPAIR OF HEART VALVE         Review of patient's allergies indicates:   Allergen Reactions    Nitrofurantoin monohyd/m-cryst        Current Facility-Administered Medications on File Prior to Encounter   Medication    [COMPLETED] albuterol-ipratropium 2.5 mg-0.5 mg/3 mL nebulizer solution 3 mL    [COMPLETED] azithromycin (ZITHROMAX) 500 mg in 0.9% NaCl 250 mL IVPB (admixture device)    [COMPLETED] cefTRIAXone injection 2 g    [COMPLETED] iohexoL (OMNIPAQUE 350) injection 84 mL    [COMPLETED] LORazepam injection 1 mg    [COMPLETED] methylPREDNISolone sodium succinate injection 125 mg    sulfamethoxazole-trimethoprim 800-160mg per tablet 1 tablet    [DISCONTINUED] acetaminophen tablet 1,000 mg    [DISCONTINUED] albuterol-ipratropium 2.5 mg-0.5 mg/3 mL nebulizer solution 3 mL    [DISCONTINUED] aluminum-magnesium hydroxide-simethicone 200-200-20 mg/5 mL suspension 30 mL    [DISCONTINUED] azithromycin (ZITHROMAX) 500 mg in 0.9% NaCl 250 mL IVPB (admixture device)    [DISCONTINUED] benzonatate capsule 200 mg    [DISCONTINUED] bisacodyL suppository 10 mg    [DISCONTINUED] cefTRIAXone injection 1 g    [DISCONTINUED] dextromethorphan-guaiFENesin  mg/5 ml liquid 5 mL    [DISCONTINUED] dextrose 50% injection 12.5 g    [DISCONTINUED] dextrose 50% injection 25 g     [DISCONTINUED] enoxaparin injection 40 mg    [DISCONTINUED] glucagon (human recombinant) injection 1 mg    [DISCONTINUED] glucose chewable tablet 16 g    [DISCONTINUED] glucose chewable tablet 24 g    [DISCONTINUED] insulin aspart U-100 injection 0-10 Units    [DISCONTINUED] melatonin tablet 6 mg    [DISCONTINUED] methylPREDNISolone sodium succinate injection 60 mg    [DISCONTINUED] naloxone 0.4 mg/mL injection 0.02 mg    [DISCONTINUED] ondansetron injection 4 mg    [DISCONTINUED] prochlorperazine injection Soln 5 mg    [DISCONTINUED] senna-docusate 8.6-50 mg per tablet 1 tablet    [DISCONTINUED] sodium chloride 0.9% flush 10 mL     Current Outpatient Medications on File Prior to Encounter   Medication Sig    albuterol (PROVENTIL/VENTOLIN HFA) 90 mcg/actuation inhaler Inhale 2 puffs into the lungs every 6 (six) hours as needed for Wheezing or Shortness of Breath.    tamsulosin (FLOMAX) 0.4 mg Cap Take 0.4 mg by mouth once daily.    traZODone (DESYREL) 150 MG tablet TAKE 1 TABLET(150 MG) BY MOUTH EVERY EVENING    UNABLE TO FIND Suppositories for constipation    albuterol-ipratropium (DUO-NEB) 2.5 mg-0.5 mg/3 mL nebulizer solution Take 3 mLs by nebulization every 6 (six) hours as needed for Wheezing. Rescue    alendronate (FOSAMAX) 70 MG tablet Take 1 tablet (70 mg total) by mouth every 7 days. Must be taken with 6-8 oz of water. Do not eat or take other medications for 30 minutes. Do not lay down for 30 minutes after taking. (Patient not taking: Reported on 3/18/2025)    aspirin (ECOTRIN) 81 MG EC tablet Take 81 mg by mouth once daily. (Patient not taking: Reported on 3/18/2025)    carvediloL (COREG) 6.25 MG tablet Take 0.5 tablets (3.125 mg total) by mouth 2 (two) times daily.    donepeziL (ARICEPT) 10 MG tablet Take 1 tablet (10 mg total) by mouth every evening.    finasteride (PROSCAR) 5 mg tablet Take 1 tablet (5 mg total) by mouth once daily.    levocetirizine (XYZAL) 5 MG tablet Take 1 tablet by mouth once  daily.    multivitamin (THERAGRAN) per tablet Take 1 tablet by mouth once daily. (Patient not taking: Reported on 3/18/2025)    nicotine polacrilex 2 MG Lozg Take 1 lozenge (2 mg total) by mouth as needed (Do not exceed more than 10 pieces in 24 hours). (Patient not taking: Reported on 3/18/2025)    rosuvastatin (CRESTOR) 10 MG tablet     sulfamethoxazole-trimethoprim 400-80mg (BACTRIM,SEPTRA) 400-80 mg per tablet Take 1 tablet by mouth 2 (two) times daily. for 3 days    TRUE METRIX GLUCOSE TEST STRIP Strp TEST BLOOD SUGAR EVERY DAY    TRUEPLUS LANCETS 33 gauge Misc TEST BLOOD SUGAR EVERY DAY    [DISCONTINUED] sulfamethoxazole-trimethoprim 800-160mg (BACTRIM DS) 800-160 mg Tab Take 1 tablet by mouth 2 (two) times daily.     Family History       Problem Relation (Age of Onset)    No Known Problems Mother, Father, Sister, Brother          Tobacco Use    Smoking status: Former     Current packs/day: 0.00     Average packs/day: 0.9 packs/day for 58.5 years (54.6 ttl pk-yrs)     Types: Cigarettes     Start date:      Quit date: 10/4/2024     Years since quittin.4     Passive exposure: Past    Smokeless tobacco: Never    Tobacco comments:     Quit 10/4/24   Substance and Sexual Activity    Alcohol use: Never    Drug use: Never    Sexual activity: Yes     Review of Systems   Constitutional: Negative.    HENT: Negative.     Eyes: Negative.    Respiratory:  Positive for cough and shortness of breath.    Cardiovascular: Negative.    Gastrointestinal: Negative.    Endocrine: Negative.    Genitourinary: Negative.    Musculoskeletal: Negative.    Skin: Negative.    Allergic/Immunologic: Negative.    Neurological: Negative.    Hematological: Negative.    Psychiatric/Behavioral: Negative.       Objective:     Vital Signs (Most Recent):  Temp: 97.7 °F (36.5 °C) (25)  Pulse: 73 (25)  Resp: (!) 24 (25)  BP: (!) 115/59 (25)  SpO2: (!) 93 % (25) Vital Signs (24h  "Range):  Temp:  [96.8 °F (36 °C)-98.1 °F (36.7 °C)] 97.7 °F (36.5 °C)  Pulse:  [] 73  Resp:  [15-24] 24  SpO2:  [93 %-100 %] 93 %  BP: ()/(50-84) 115/59     Weight: 65.8 kg (145 lb)  Body mass index is 20.22 kg/m².     Physical Exam  Constitutional:       Appearance: Normal appearance.   HENT:      Head: Normocephalic and atraumatic.      Mouth/Throat:      Mouth: Mucous membranes are dry.   Eyes:      Extraocular Movements: Extraocular movements intact.      Conjunctiva/sclera: Conjunctivae normal.      Pupils: Pupils are equal, round, and reactive to light.   Cardiovascular:      Rate and Rhythm: Regular rhythm. Tachycardia present.      Pulses: Normal pulses.      Heart sounds: Normal heart sounds.   Pulmonary:      Breath sounds: Wheezing present.      Comments: Coarse BS  Abdominal:      General: Abdomen is flat. Bowel sounds are normal.      Palpations: Abdomen is soft.   Musculoskeletal:         General: Normal range of motion.      Cervical back: Normal range of motion and neck supple.   Skin:     General: Skin is warm and dry.   Neurological:      General: No focal deficit present.      Mental Status: He is alert and oriented to person, place, and time.   Psychiatric:         Mood and Affect: Mood normal.              CRANIAL NERVES     CN III, IV, VI   Pupils are equal, round, and reactive to light.       Significant Labs: All pertinent labs within the past 24 hours have been reviewed.  A1C:   Recent Labs   Lab 03/18/25  1038   HGBA1C 6.1     ABGs:   Recent Labs   Lab 03/20/25  2253 03/21/25  0045   PH 7.160* 7.330*   PCO2 83.0* 52.0*   HCO3 29.6* 27.4*   COHB 5.2* 3.8*   METHB 0.7 1.1   PO2 65.0* 84.0     Bilirubin:   Recent Labs   Lab 03/18/25  1038 03/20/25  2243 03/21/25  0628 03/21/25  1728   BILITOT 0.6 0.4 0.4 0.3     Blood Culture: No results for input(s): "LABBLOO" in the last 48 hours.  BMP:   Recent Labs   Lab 03/21/25  0628 03/21/25  1728    141   K 4.5 3.9    104   CO2 " "25 24   BUN 19.2 28.1*   CREATININE 1.17 1.45*   CALCIUM 8.9 9.6   MG 2.20  --      CBC:   Recent Labs   Lab 03/20/25 2243 03/21/25  0516 03/21/25  1728   WBC 17.74* 15.00* 20.18*   HGB 14.3 14.1 13.8*   HCT 43.4 43.0 41.0*    176 208     CMP:   Recent Labs   Lab 03/20/25 2243 03/21/25  0628 03/21/25  1728    138 141   K 5.1 4.5 3.9   * 103 104   CO2 23 25 24   BUN 19.8 19.2 28.1*   CREATININE 1.08 1.17 1.45*   CALCIUM 8.9 8.9 9.6   ALBUMIN 3.8 3.7 3.8   BILITOT 0.4 0.4 0.3   ALKPHOS 77 75 70   AST 50* 29 22   ALT 30 28 23     Cardiac Markers:   Recent Labs   Lab 03/21/25  0305   .2*     Coagulation: No results for input(s): "PT", "INR", "APTT" in the last 48 hours.  Lactic Acid: No results for input(s): "LACTATE" in the last 48 hours.  Lipase: No results for input(s): "LIPASE" in the last 48 hours.  Lipid Panel: No results for input(s): "CHOL", "HDL", "LDLCALC", "TRIG", "CHOLHDL" in the last 48 hours.  Magnesium:   Recent Labs   Lab 03/21/25  0628   MG 2.20     Pathology Results  (Last 10 years)      None          POCT Glucose: No results for input(s): "POCTGLUCOSE" in the last 48 hours.  Prealbumin: No results for input(s): "PREALBUMIN" in the last 48 hours.  Respiratory Culture: No results for input(s): "GSRESP", "RESPIRATORYC" in the last 48 hours.  Troponin:   Recent Labs   Lab 03/20/25 2243   TROPONINI <0.010     TSH:   Recent Labs   Lab 03/18/25  1038   TSH 1.852     Urine Culture: No results for input(s): "LABURIN" in the last 48 hours.  Urine Studies: No results for input(s): "COLORU", "APPEARANCEUA", "PHUR", "SPECGRAV", "PROTEINUA", "GLUCUA", "KETONESU", "BILIRUBINUA", "OCCULTUA", "NITRITE", "UROBILINOGEN", "LEUKOCYTESUR", "RBCUA", "WBCUA", "BACTERIA", "SQUAMEPITHEL", "HYALINECASTS" in the last 48 hours.    Invalid input(s): "WRIGHTSUR"  Flu negative  Significant Imaging:   Chest CTA  Negative for pulmonary thromboembolic disease. No acute findings in the chest. "       Assessment & Plan  Dyspnea  - COPD Exacerbation due to acute bronchitis vs early pneumonia on  Rocephin, Azithromycin, Solumedrol, nebs, and mucinex  -Leukocytosis due to above, Check UA  - BPH on Proscar  - HTN on coreg  -Hx CAD on ASA  -Hyperglycemia check Hgb AC, and SSI  -DVT ppx on Lovenox  Pt is Full code,   Pt is seen and examined via telemed. Pt is in [facility]. I am in [place], [place]. Nursing staff assisted with evaluation of the patient. Software is Audio/Video  Pt is being admitted an observation to the hospitalist service for further eval and treat;    VTE Risk Mitigation (From admission, onward)           Ordered     enoxaparin injection 30 mg  Every 24 hours         03/21/25 2052                    Discharge Planning   KONSTANTIN:      Code Status: Prior   Medical Readiness for Discharge Date:                            Mendoza Warren MD  Department of Hospital Medicine   [facility] - Orthopaedics

## 2025-03-22 NOTE — PLAN OF CARE
Problem: Adult Inpatient Plan of Care  Goal: Plan of Care Review  Outcome: Progressing  Goal: Patient-Specific Goal (Individualized)  Outcome: Progressing  Goal: Absence of Hospital-Acquired Illness or Injury  Outcome: Progressing  Goal: Optimal Comfort and Wellbeing  Outcome: Progressing  Goal: Readiness for Transition of Care  Outcome: Progressing     Problem: COPD (Chronic Obstructive Pulmonary Disease)  Goal: Optimal Chronic Illness Coping  Outcome: Progressing  Goal: Optimal Level of Functional [place]  Outcome: Progressing  Goal: Absence of Infection Signs and Symptoms  Outcome: Progressing  Goal: Improved Oral Intake  Outcome: Progressing  Goal: Effective Oxygenation and Ventilation  Outcome: Progressing     Problem: Fall Injury Risk  Goal: Absence of Fall and Fall-Related Injury  Outcome: Progressing     Problem: Infection  Goal: Absence of Infection Signs and Symptoms  Outcome: Progressing     Problem: Fatigue  Goal: Improved Activity Tolerance  Outcome: Progressing

## 2025-03-22 NOTE — PROGRESS NOTES
[facility] ORTHOPAEDIC  [facility] Medicine Progress Note      Patient Name: Gustavo Lanza  MRN: [id number]  Admission Date: 3/21/2025   Length of Stay: 0  Attending Physician: Mau Oliva MD  Primary Care Provider: Gabino Pimentel MD  Face-to-Face encounter date: 03/22/2025    Code Status: Prior        Chief Complaint:   Shortness of Breath (SOB x3 days, increase thirst, and chills)        HPI:   79 year male with hx of HTN, BPH,  COPD, presents here for sob. Was seen at other campus yesterday and found to be hypoxic. He is breathless after ambulation and says sometimes legs give out b/c he becomes weak with ambulation. [facility] with [facility] but left b/c he could not get water. Comes here today. No fever or chills, occ cough. Pt has quit smoking about a year ago     Overview/Hospital Course:  No notes on file       Interval Hx:   Pt resting in bed, currently feels good, no f/c, +mild cough no sputum, on room air, ambulating by self.  Pt worried why this was happening, gets sob can't breath with weakness/falls.  No cp    Review of Systems   All other systems reviewed and are negative.      Objective/physical exam:  General: In no acute distress, afebrile  Chest: diminished to auscultation bilaterally  Heart: RRR, +S1, S2, no appreciable murmur  Abdomen: Soft, nontender, BS +  MSK: Warm, no lower extremity edema, no clubbing or cyanosis  Neurologic: Alert and oriented x4  Psych/mental status: Appropriate mood and affect. Responds appropriately to questions.     VITAL SIGNS: 24 HRS MIN & MAX LAST   Temp  Min: 97.5 °F (36.4 °C)  Max: 98.2 °F (36.8 °C) 98.2 °F (36.8 °C)   BP  Min: 112/62  Max: 138/64 136/63   Pulse  Min: 58  Max: 105  78   Resp  Min: 18  Max: 24 18   SpO2  Min: 93 %  Max: 98 % 95 %       Recent Labs   Lab 03/21/25  0516 03/21/25  1728 03/22/25  0557   WBC 15.00* 20.18* 21.62*   RBC 4.71 4.52* 4.23*   HGB 14.1 13.8*  12.8*   HCT 43.0 41.0* 38.0*   MCV 91.3 90.7 89.8   MCH 29.9 30.5 30.3   MCHC 32.8* 33.7 33.7   RDW 14.2 13.6 13.9    208 182   MPV 9.8 9.9 10.3       Recent Labs   Lab 03/20/25  2253 03/21/25  0045 03/21/25  0628 03/21/25  1728 03/22/25  0557   NA  --   --  138 141 140   K  --   --  4.5 3.9 4.6   CL  --   --  103 104 108*   CO2  --   --  25 24 23   BUN  --   --  19.2 28.1* 25.1   CREATININE  --   --  1.17 1.45* 1.26*   CALCIUM  --   --  8.9 9.6 8.7*   PH 7.160* 7.330*  --   --   --    MG  --   --  2.20  --   --    ALBUMIN  --   --  3.7 3.8 3.1*   ALKPHOS  --   --  75 70 57   ALT  --   --  28 23 19   AST  --   --  29 22 18   BILITOT  --   --  0.4 0.3 0.3        Microbiology Results (last 7 days)       ** No results found for the last 168 hours. **             Radiology:  X-Ray Chest 1 View  Narrative: EXAMINATION:  XR CHEST 1 VIEW    CLINICAL HISTORY:  shortness of breath;, .    COMPARISON:  June 4, 2023    FINDINGS:  No alveolar consolidation, effusion, or pneumothorax is seen.   The thoracic aorta is normal  cardiac silhouette, central pulmonary vessels and mediastinum are normal in size and are grossly unremarkable.   visualized osseous structures are grossly unremarkable.  Impression: No acute chest disease is identified.    Electronically signed by: Kevyn Mckeon  Date:    03/21/2025  Time:    08:12  CTA Chest Non-Coronary (PE Studies)  Narrative: EXAMINATION:  CTA CHEST NON CORONARY (PE STUDIES)    CLINICAL HISTORY:  Pulmonary embolism (PE) suspected, positive D-dimer;.  Short of breath.    TECHNIQUE:  Helical acquisition through the chest with IV contrast targeting the pulmonary arteries. Multiplanar and 3D MIP reconstructed images were provided for review.   mGycm. Automatic exposure control, adjustment of mA/kV or iterative reconstruction technique was used to reduce radiation.    COMPARISON:  14 February 2025.    FINDINGS:  There is good opacification of the pulmonary arterial tree. There  is no evidence of pulmonary thromboembolism.  There is no aortic dissection.    There is no mediastinal, hilar or axillary lymphadenopathy by size criteria.    Heart is mildly enlarged.  No pericardial effusion.  There are coronary artery calcifications.  There are changes of CABG.    No pleural effusion.  Moderate emphysema.  New opacities suspicious for pneumonia.  Similar scarring in the lingula.    No acute findings in the imaged upper abdomen.  No acute osseous findings.  Impression: Negative for pulmonary thromboembolic disease.  No acute findings in the chest.    Electronically signed by: Kong Davalos  Date:    03/21/2025  Time:    06:50      Scheduled Med:   albuterol sulfate  2.5 mg Nebulization Q6H    aspirin  81 mg Oral Daily    azithromycin  250 mg Oral Daily    carvediloL  3.125 mg Oral BID    cefTRIAXone (Rocephin) IV (PEDS and ADULTS)  1 g Intravenous Q24H    enoxparin  30 mg Subcutaneous Q24H (prophylaxis, 1700)    famotidine (PF)  20 mg Intravenous Daily    finasteride  5 mg Oral Daily    guaiFENesin  600 mg Oral BID    methylPREDNISolone sodium succinate  40 mg Intravenous Q8H            Nutrition Status:      Assessment/Plan:  Copd exacerbation  Leukocytosis  [place]  Uti E coli   Hx Bph,htn,cad    plan     Cont rocephin,zithromax,duonebs,solumedrol  Home meds/iss  A1c 6  Bcx no growth    Dvt proph: lovenox        All diagnosis and differential diagnosis have been reviewed; assessment and plan has been documented; I have personally reviewed the labs and test results that are presently available; I have reviewed the patients medication list; I have reviewed the consulting providers response and recommendations. I have reviewed or attempted to review medical records based upon their availability      _____________________________________________________________________            Mau Oliva MD   03/22/2025

## 2025-03-22 NOTE — PT/OT/SLP EVAL
Physical Therapy Evaluation and Discharge Note    Patient Name:  Gustavo Lnaza   MRN:  [id number]    Recommendations:     Discharge Recommendations:    Discharge Equipment Recommendations: none   Barriers to discharge: None    Assessment:     Gustavo Lanza is a 79 y.o. male admitted with a medical diagnosis of <principal problem not specified>. .  At this time, patient is functioning at their prior level of function and does not require further acute PT services.     Recent Surgery: * No surgery found *      Plan:     During this hospitalization, patient does not require further acute PT services.  Please re-consult if situation changes.      Subjective     Chief Complaint: No complaints  Patient/Family Comments/goals: to go home  Pain/Comfort:  Pain Rating 1: 0/10    Patients cultural, spiritual, Mandaen conflicts given the current situation: no    Living Environment:  Pt lives with ex wife in first floor apartment, pt states he takes care of his ex wife.  Prior to admission, patients level of function was independent.  Equipment used at home: none.  DME owned (not currently used): none.  Upon discharge, patient will have assistance from no one.    Objective:     Communicated with NSG prior to session.  Patient found supine with peripheral IV upon PT entry to room.    General Precautions: Standard, fall    Orthopedic Precautions:N/A   Braces: N/A  Respiratory Status: Room air    Exams:  RUE ROM: WNL  RUE Strength: WNL  LUE ROM: WNL  LUE Strength: WNL  RLE ROM: WNL  RLE Strength: WNL  LLE ROM: WNL  LLE Strength: WNL    Functional Mobility:  Bed Mobility:     Rolling Left:  independence  Rolling Right: independence  Bridging: independence  Supine to Sit: independence  Sit to Supine: independence  Transfers:     Sit to Stand:  independence with no AD  Toilet Transfer: independence with  no AD  using  Step Transfer  Car Transfer: independence with  no AD  using  Step Transfer  Gait: 300 ft  independent    AM-PAC 6 CLICK MOBILITY  Total Score:        Treatment and Education:  Pt is a 78 yo male who presents to PT without deficits. Pt has COPD and pnemonia. Pt reports being independent at home and not using any assistive device. Pt ambulated without assistance at a good gait speed, transferred and performed bed mobility independently. Pt does not require skilled PT at this time.     AM-PAC 6 CLICK MOBILITY  Total Score:      Patient left supine with all lines intact and call button in reach.    GOALS:   Multidisciplinary Problems       Physical Therapy Goals          Problem: Physical Therapy    Goal Priority Disciplines Outcome Interventions   Physical Therapy Goal     PT, PT/OT                         DME Justifications:  No DME recommended requiring DME justifications    History:     Past Medical History:   Diagnosis Date    AAA (abdominal aortic aneurysm)     Abdominal aortic aneurysm, without rupture, unspecified     Acute bilateral low back pain without sciatica     Aneurysm of descending thoracic aorta without rupture     Atherosclerosis of arteries of extremities     Benign prostatic hyperplasia without lower urinary tract symptoms     BPH (benign prostatic hyperplasia) 08/03/2022    Carotid artery stenosis     Chronic idiopathic constipation 08/03/2022    Compression fracture of L1 vertebra, initial encounter     COPD (chronic obstructive pulmonary disease)     Coronary artery disease involving native coronary artery of native heart without angina pectoris 08/03/2022    Disorder of kidney and ureter     Hyperlipidemia, unspecified     Impaired fasting glucose 08/03/2022    Leg weakness, bilateral     Mixed hyperlipidemia 08/03/2022    Neural foraminal stenosis of lumbar spine     Nonrheumatic mitral (valve) stenosis     Occlusion and stenosis of bilateral carotid arteries     Peripheral vascular disease, unspecified     Presence of other vascular implants and grafts     Primary insomnia      Raynaud disease 08/03/2022    S/P AAA repair 08/03/2022    Solitary pulmonary nodule     Spinal stenosis of lumbar region with neurogenic claudication     Spinal stenosis, lumbosacral region     Stage 3a chronic kidney disease        Past Surgical History:   Procedure Laterality Date    ABDOMINAL AORTIC ANEURYSM REPAIR  03/13/2020    Surgeon: Dr. Ovalles    AORTOGRAM W/ BLE RUNOFF Bilateral 05/30/2024    Mg Cullen MD    CARDIAC SURGERY  01/30/2014    CABG    COLONOSCOPY  11/12/2021    CARLOS MOHAMUD MD    HERNIA REPAIR Left 03/23/2016    PERIPHERAL ANGIOGRAPHY  12/18/2015    Femoral Popliteal revas w/stent    REPAIR OF HEART VALVE         Time Tracking:     PT Received On: 03/22/25  PT Start Time: 0900     PT Stop Time: 0920  PT Total Time (min): 20 min     Billable Minutes: Evaluation 20      03/22/2025

## 2025-03-22 NOTE — ASSESSMENT & PLAN NOTE
- COPD Exacerbation due to acute bronchitis vs early pneumonia on  Rocephin, Azithromycin, Solumedrol, nebs, and mucinex  -Leukocytosis due to above, Check UA  - BPH on Proscar  - HTN on coreg  -Hx CAD on ASA  -Hyperglycemia check Hgb AC, and SSI  -DVT ppx on Lovenox  Pt is Full code,   Pt is seen and examined via telemed. Pt is in [facility]. I am in [place], [place]. Nursing staff assisted with evaluation of the patient. Software is Audio/Video  Pt is being admitted an observation to the hospitalist service for further eval and treat;

## 2025-03-22 NOTE — PLAN OF CARE
Problem: Adult Inpatient Plan of Care  Goal: Plan of Care Review  Outcome: Progressing  Goal: Patient-Specific Goal (Individualized)  Outcome: Progressing  Goal: Absence of Hospital-Acquired Illness or Injury  Outcome: Progressing  Goal: Optimal Comfort and Wellbeing  Outcome: Progressing  Goal: Readiness for Transition of Care  Outcome: Progressing     Problem: COPD (Chronic Obstructive Pulmonary Disease)  Goal: Optimal Chronic Illness Coping  Outcome: Progressing  Goal: Optimal Level of Functional Bryan  Outcome: Progressing  Goal: Absence of Infection Signs and Symptoms  Outcome: Progressing  Goal: Improved Oral Intake  Outcome: Progressing  Goal: Effective Oxygenation and Ventilation  Outcome: Progressing     Problem: Fall Injury Risk  Goal: Absence of Fall and Fall-Related Injury  Outcome: Progressing     Problem: Infection  Goal: Absence of Infection Signs and Symptoms  Outcome: Progressing     Problem: Fatigue  Goal: Improved Activity Tolerance  Outcome: Progressing

## 2025-03-22 NOTE — SUBJECTIVE & OBJECTIVE
Past Medical History:   Diagnosis Date    AAA (abdominal aortic aneurysm)     Abdominal aortic aneurysm, without rupture, unspecified     Acute bilateral low back pain without sciatica     Aneurysm of descending thoracic aorta without rupture     Atherosclerosis of arteries of extremities     Benign prostatic hyperplasia without lower urinary tract symptoms     BPH (benign prostatic hyperplasia) 08/03/2022    Carotid artery stenosis     Chronic idiopathic constipation 08/03/2022    Compression fracture of L1 vertebra, initial encounter     COPD (chronic obstructive pulmonary disease)     Coronary artery disease involving native coronary artery of native heart without angina pectoris 08/03/2022    Disorder of kidney and ureter     Hyperlipidemia, unspecified     Impaired fasting glucose 08/03/2022    Leg weakness, bilateral     Mixed hyperlipidemia 08/03/2022    Neural foraminal stenosis of lumbar spine     Nonrheumatic mitral (valve) stenosis     Occlusion and stenosis of bilateral carotid arteries     Peripheral vascular disease, unspecified     Presence of other vascular implants and grafts     Primary insomnia     Raynaud disease 08/03/2022    S/P AAA repair 08/03/2022    Solitary pulmonary nodule     Spinal stenosis of lumbar region with neurogenic claudication     Spinal stenosis, lumbosacral region     Stage 3a chronic kidney disease        Past Surgical History:   Procedure Laterality Date    ABDOMINAL AORTIC ANEURYSM REPAIR  03/13/2020    Surgeon: Dr. Ovalles    AORTOGRAM W/ BLE RUNOFF Bilateral 05/30/2024    Mg Cullen MD    CARDIAC SURGERY  01/30/2014    CABG    COLONOSCOPY  11/12/2021    CARLOS MOHAMUD MD    HERNIA REPAIR Left 03/23/2016    PERIPHERAL ANGIOGRAPHY  12/18/2015    Femoral Popliteal revas w/stent    REPAIR OF HEART VALVE         Review of patient's allergies indicates:   Allergen Reactions    Nitrofurantoin monohyd/m-cryst        Current Facility-Administered Medications on File Prior to  Encounter   Medication    [COMPLETED] albuterol-ipratropium 2.5 mg-0.5 mg/3 mL nebulizer solution 3 mL    [COMPLETED] azithromycin (ZITHROMAX) 500 mg in 0.9% NaCl 250 mL IVPB (admixture device)    [COMPLETED] cefTRIAXone injection 2 g    [COMPLETED] iohexoL (OMNIPAQUE 350) injection 84 mL    [COMPLETED] LORazepam injection 1 mg    [COMPLETED] methylPREDNISolone sodium succinate injection 125 mg    sulfamethoxazole-trimethoprim 800-160mg per tablet 1 tablet    [DISCONTINUED] acetaminophen tablet 1,000 mg    [DISCONTINUED] albuterol-ipratropium 2.5 mg-0.5 mg/3 mL nebulizer solution 3 mL    [DISCONTINUED] aluminum-magnesium hydroxide-simethicone 200-200-20 mg/5 mL suspension 30 mL    [DISCONTINUED] azithromycin (ZITHROMAX) 500 mg in 0.9% NaCl 250 mL IVPB (admixture device)    [DISCONTINUED] benzonatate capsule 200 mg    [DISCONTINUED] bisacodyL suppository 10 mg    [DISCONTINUED] cefTRIAXone injection 1 g    [DISCONTINUED] dextromethorphan-guaiFENesin  mg/5 ml liquid 5 mL    [DISCONTINUED] dextrose 50% injection 12.5 g    [DISCONTINUED] dextrose 50% injection 25 g    [DISCONTINUED] enoxaparin injection 40 mg    [DISCONTINUED] glucagon (human recombinant) injection 1 mg    [DISCONTINUED] glucose chewable tablet 16 g    [DISCONTINUED] glucose chewable tablet 24 g    [DISCONTINUED] insulin aspart U-100 injection 0-10 Units    [DISCONTINUED] melatonin tablet 6 mg    [DISCONTINUED] methylPREDNISolone sodium succinate injection 60 mg    [DISCONTINUED] naloxone 0.4 mg/mL injection 0.02 mg    [DISCONTINUED] ondansetron injection 4 mg    [DISCONTINUED] prochlorperazine injection Soln 5 mg    [DISCONTINUED] senna-docusate 8.6-50 mg per tablet 1 tablet    [DISCONTINUED] sodium chloride 0.9% flush 10 mL     Current Outpatient Medications on File Prior to Encounter   Medication Sig    albuterol (PROVENTIL/VENTOLIN HFA) 90 mcg/actuation inhaler Inhale 2 puffs into the lungs every 6 (six) hours as needed for Wheezing or  Shortness of Breath.    tamsulosin (FLOMAX) 0.4 mg Cap Take 0.4 mg by mouth once daily.    traZODone (DESYREL) 150 MG tablet TAKE 1 TABLET(150 MG) BY MOUTH EVERY EVENING    UNABLE TO FIND Suppositories for constipation    albuterol-ipratropium (DUO-NEB) 2.5 mg-0.5 mg/3 mL nebulizer solution Take 3 mLs by nebulization every 6 (six) hours as needed for Wheezing. Rescue    alendronate (FOSAMAX) 70 MG tablet Take 1 tablet (70 mg total) by mouth every 7 days. Must be taken with 6-8 oz of water. Do not eat or take other medications for 30 minutes. Do not lay down for 30 minutes after taking. (Patient not taking: Reported on 3/18/2025)    aspirin (ECOTRIN) 81 MG EC tablet Take 81 mg by mouth once daily. (Patient not taking: Reported on 3/18/2025)    carvediloL (COREG) 6.25 MG tablet Take 0.5 tablets (3.125 mg total) by mouth 2 (two) times daily.    donepeziL (ARICEPT) 10 MG tablet Take 1 tablet (10 mg total) by mouth every evening.    finasteride (PROSCAR) 5 mg tablet Take 1 tablet (5 mg total) by mouth once daily.    levocetirizine (XYZAL) 5 MG tablet Take 1 tablet by mouth once daily.    multivitamin (THERAGRAN) per tablet Take 1 tablet by mouth once daily. (Patient not taking: Reported on 3/18/2025)    nicotine polacrilex 2 MG Lozg Take 1 lozenge (2 mg total) by mouth as needed (Do not exceed more than 10 pieces in 24 hours). (Patient not taking: Reported on 3/18/2025)    rosuvastatin (CRESTOR) 10 MG tablet     sulfamethoxazole-trimethoprim 400-80mg (BACTRIM,SEPTRA) 400-80 mg per tablet Take 1 tablet by mouth 2 (two) times daily. for 3 days    TRUE METRIX GLUCOSE TEST STRIP Strp TEST BLOOD SUGAR EVERY DAY    TRUEPLUS LANCETS 33 gauge Misc TEST BLOOD SUGAR EVERY DAY    [DISCONTINUED] sulfamethoxazole-trimethoprim 800-160mg (BACTRIM DS) 800-160 mg Tab Take 1 tablet by mouth 2 (two) times daily.     Family History       Problem Relation (Age of Onset)    No Known Problems Mother, Father, Sister, Brother          Tobacco  Use    Smoking status: Former     Current packs/day: 0.00     Average packs/day: 0.9 packs/day for 58.5 years (54.6 ttl pk-yrs)     Types: Cigarettes     Start date:      Quit date: 10/4/2024     Years since quittin.4     Passive exposure: Past    Smokeless tobacco: Never    Tobacco comments:     Quit 10/4/24   Substance and Sexual Activity    Alcohol use: Never    Drug use: Never    Sexual activity: Yes     Review of Systems   Constitutional: Negative.    HENT: Negative.     Eyes: Negative.    Respiratory:  Positive for cough and shortness of breath.    Cardiovascular: Negative.    Gastrointestinal: Negative.    Endocrine: Negative.    Genitourinary: Negative.    Musculoskeletal: Negative.    Skin: Negative.    Allergic/Immunologic: Negative.    Neurological: Negative.    Hematological: Negative.    Psychiatric/Behavioral: Negative.       Objective:     Vital Signs (Most Recent):  Temp: 97.7 °F (36.5 °C) (25)  Pulse: 73 (25)  Resp: (!) 24 (25)  BP: (!) 115/59 (25)  SpO2: (!) 93 % (25) Vital Signs (24h Range):  Temp:  [96.8 °F (36 °C)-98.1 °F (36.7 °C)] 97.7 °F (36.5 °C)  Pulse:  [] 73  Resp:  [15-24] 24  SpO2:  [93 %-100 %] 93 %  BP: ()/(50-84) 115/59     Weight: 65.8 kg (145 lb)  Body mass index is 20.22 kg/m².     Physical Exam  Constitutional:       Appearance: Normal appearance.   HENT:      Head: Normocephalic and atraumatic.      Mouth/Throat:      Mouth: Mucous membranes are dry.   Eyes:      Extraocular Movements: Extraocular movements intact.      Conjunctiva/sclera: Conjunctivae normal.      Pupils: Pupils are equal, round, and reactive to light.   Cardiovascular:      Rate and Rhythm: Regular rhythm. Tachycardia present.      Pulses: Normal pulses.      Heart sounds: Normal heart sounds.   Pulmonary:      Breath sounds: Wheezing present.      Comments: Coarse BS  Abdominal:      General: Abdomen is flat. Bowel sounds are normal.  "     Palpations: Abdomen is soft.   Musculoskeletal:         General: Normal range of motion.      Cervical back: Normal range of motion and neck supple.   Skin:     General: Skin is warm and dry.   Neurological:      General: No focal deficit present.      Mental Status: He is alert and oriented to person, place, and time.   Psychiatric:         Mood and Affect: Mood normal.              CRANIAL NERVES     CN III, IV, VI   Pupils are equal, round, and reactive to light.       Significant Labs: All pertinent labs within the past 24 hours have been reviewed.  A1C:   Recent Labs   Lab 03/18/25  1038   HGBA1C 6.1     ABGs:   Recent Labs   Lab 03/20/25  2253 03/21/25  0045   PH 7.160* 7.330*   PCO2 83.0* 52.0*   HCO3 29.6* 27.4*   COHB 5.2* 3.8*   METHB 0.7 1.1   PO2 65.0* 84.0     Bilirubin:   Recent Labs   Lab 03/18/25  1038 03/20/25  2243 03/21/25  0628 03/21/25  1728   BILITOT 0.6 0.4 0.4 0.3     Blood Culture: No results for input(s): "LABBLOO" in the last 48 hours.  BMP:   Recent Labs   Lab 03/21/25  0628 03/21/25  1728    141   K 4.5 3.9    104   CO2 25 24   BUN 19.2 28.1*   CREATININE 1.17 1.45*   CALCIUM 8.9 9.6   MG 2.20  --      CBC:   Recent Labs   Lab 03/20/25  2243 03/21/25  0516 03/21/25  1728   WBC 17.74* 15.00* 20.18*   HGB 14.3 14.1 13.8*   HCT 43.4 43.0 41.0*    176 208     CMP:   Recent Labs   Lab 03/20/25  2243 03/21/25  0628 03/21/25  1728    138 141   K 5.1 4.5 3.9   * 103 104   CO2 23 25 24   BUN 19.8 19.2 28.1*   CREATININE 1.08 1.17 1.45*   CALCIUM 8.9 8.9 9.6   ALBUMIN 3.8 3.7 3.8   BILITOT 0.4 0.4 0.3   ALKPHOS 77 75 70   AST 50* 29 22   ALT 30 28 23     Cardiac Markers:   Recent Labs   Lab 03/21/25  0305   .2*     Coagulation: No results for input(s): "PT", "INR", "APTT" in the last 48 hours.  Lactic Acid: No results for input(s): "LACTATE" in the last 48 hours.  Lipase: No results for input(s): "LIPASE" in the last 48 hours.  Lipid Panel: No results for " "input(s): "CHOL", "HDL", "LDLCALC", "TRIG", "CHOLHDL" in the last 48 hours.  Magnesium:   Recent Labs   Lab 03/21/25  0628   MG 2.20     Pathology Results  (Last 10 years)      None          POCT Glucose: No results for input(s): "POCTGLUCOSE" in the last 48 hours.  Prealbumin: No results for input(s): "PREALBUMIN" in the last 48 hours.  Respiratory Culture: No results for input(s): "GSRESP", "RESPIRATORYC" in the last 48 hours.  Troponin:   Recent Labs   Lab 03/20/25  2243   TROPONINI <0.010     TSH:   Recent Labs   Lab 03/18/25  1038   TSH 1.852     Urine Culture: No results for input(s): "LABURIN" in the last 48 hours.  Urine Studies: No results for input(s): "COLORU", "APPEARANCEUA", "PHUR", "SPECGRAV", "PROTEINUA", "GLUCUA", "KETONESU", "BILIRUBINUA", "OCCULTUA", "NITRITE", "UROBILINOGEN", "LEUKOCYTESUR", "RBCUA", "WBCUA", "BACTERIA", "SQUAMEPITHEL", "HYALINECASTS" in the last 48 hours.    Invalid input(s): "WRIGHTSUR"  Flu negative  Significant Imaging:   Chest CTA  Negative for pulmonary thromboembolic disease. No acute findings in the chest.   "

## 2025-03-23 VITALS
SYSTOLIC BLOOD PRESSURE: 153 MMHG | HEIGHT: 71 IN | WEIGHT: 145.06 LBS | BODY MASS INDEX: 20.31 KG/M2 | DIASTOLIC BLOOD PRESSURE: 80 MMHG | OXYGEN SATURATION: 96 % | HEART RATE: 72 BPM | RESPIRATION RATE: 18 BRPM | TEMPERATURE: 98 F

## 2025-03-23 PROBLEM — J44.1 COPD EXACERBATION: Status: ACTIVE | Noted: 2022-08-03

## 2025-03-23 LAB
ANION GAP SERPL CALC-SCNC: 7 MEQ/L
BASOPHILS # BLD AUTO: 0.01 X10(3)/MCL
BASOPHILS NFR BLD AUTO: 0.1 %
BUN SERPL-MCNC: 27.7 MG/DL (ref 8.4–25.7)
CALCIUM SERPL-MCNC: 8.6 MG/DL (ref 8.8–10)
CHLORIDE SERPL-SCNC: 106 MMOL/L (ref 98–107)
CO2 SERPL-SCNC: 26 MMOL/L (ref 23–31)
CREAT SERPL-MCNC: 1.18 MG/DL (ref 0.72–1.25)
CREAT/UREA NIT SERPL: 23
EOSINOPHIL # BLD AUTO: 0 X10(3)/MCL (ref 0–0.9)
EOSINOPHIL NFR BLD AUTO: 0 %
ERYTHROCYTE [DISTWIDTH] IN BLOOD BY AUTOMATED COUNT: 14.2 % (ref 11.5–17)
GFR SERPLBLD CREATININE-BSD FMLA CKD-EPI: >60 ML/MIN/1.73/M2
GLUCOSE SERPL-MCNC: 209 MG/DL (ref 82–115)
HCT VFR BLD AUTO: 39.6 % (ref 42–52)
HGB BLD-MCNC: 13.1 G/DL (ref 14–18)
IMM GRANULOCYTES # BLD AUTO: 0.11 X10(3)/MCL (ref 0–0.04)
IMM GRANULOCYTES NFR BLD AUTO: 0.6 %
LYMPHOCYTES # BLD AUTO: 0.61 X10(3)/MCL (ref 0.6–4.6)
LYMPHOCYTES NFR BLD AUTO: 3.2 %
MCH RBC QN AUTO: 30 PG (ref 27–31)
MCHC RBC AUTO-ENTMCNC: 33.1 G/DL (ref 33–36)
MCV RBC AUTO: 90.8 FL (ref 80–94)
MONOCYTES # BLD AUTO: 0.43 X10(3)/MCL (ref 0.1–1.3)
MONOCYTES NFR BLD AUTO: 2.3 %
NEUTROPHILS # BLD AUTO: 17.91 X10(3)/MCL (ref 2.1–9.2)
NEUTROPHILS NFR BLD AUTO: 93.8 %
NRBC BLD AUTO-RTO: 0 %
PLATELET # BLD AUTO: 205 X10(3)/MCL (ref 130–400)
PMV BLD AUTO: 9.8 FL (ref 7.4–10.4)
POCT GLUCOSE: 172 MG/DL (ref 70–110)
POTASSIUM SERPL-SCNC: 4.2 MMOL/L (ref 3.5–5.1)
RBC # BLD AUTO: 4.36 X10(6)/MCL (ref 4.7–6.1)
SODIUM SERPL-SCNC: 139 MMOL/L (ref 136–145)
WBC # BLD AUTO: 19.07 X10(3)/MCL (ref 4.5–11.5)

## 2025-03-23 PROCEDURE — 94640 AIRWAY INHALATION TREATMENT: CPT | Mod: XB

## 2025-03-23 PROCEDURE — 27000221 HC OXYGEN, UP TO 24 HOURS

## 2025-03-23 PROCEDURE — 63700000 PHARM REV CODE 250 ALT 637 W/O HCPCS: Performed by: INTERNAL MEDICINE

## 2025-03-23 PROCEDURE — 80048 BASIC METABOLIC PNL TOTAL CA: CPT | Performed by: INTERNAL MEDICINE

## 2025-03-23 PROCEDURE — 36415 COLL VENOUS BLD VENIPUNCTURE: CPT | Performed by: INTERNAL MEDICINE

## 2025-03-23 PROCEDURE — 94761 N-INVAS EAR/PLS OXIMETRY MLT: CPT

## 2025-03-23 PROCEDURE — 25000003 PHARM REV CODE 250: Performed by: INTERNAL MEDICINE

## 2025-03-23 PROCEDURE — G0378 HOSPITAL OBSERVATION PER HR: HCPCS

## 2025-03-23 PROCEDURE — 63600175 PHARM REV CODE 636 W HCPCS: Performed by: EMERGENCY MEDICINE

## 2025-03-23 PROCEDURE — 85025 COMPLETE CBC W/AUTO DIFF WBC: CPT | Performed by: INTERNAL MEDICINE

## 2025-03-23 PROCEDURE — 25000242 PHARM REV CODE 250 ALT 637 W/ HCPCS: Performed by: INTERNAL MEDICINE

## 2025-03-23 RX ORDER — SULFAMETHOXAZOLE AND TRIMETHOPRIM 400; 80 MG/1; MG/1
1 TABLET ORAL 2 TIMES DAILY
Qty: 8 TABLET | Refills: 0 | Status: SHIPPED | OUTPATIENT
Start: 2025-03-23 | End: 2025-03-27

## 2025-03-23 RX ORDER — ALBUTEROL SULFATE 90 UG/1
2 INHALANT RESPIRATORY (INHALATION) EVERY 6 HOURS PRN
Qty: 18 G | Refills: 5 | Status: SHIPPED | OUTPATIENT
Start: 2025-03-23

## 2025-03-23 RX ORDER — AMOXICILLIN 250 MG
1 CAPSULE ORAL DAILY PRN
Qty: 10 TABLET | Refills: 0 | Status: SHIPPED | OUTPATIENT
Start: 2025-03-23

## 2025-03-23 RX ORDER — IPRATROPIUM BROMIDE AND ALBUTEROL SULFATE 2.5; .5 MG/3ML; MG/3ML
3 SOLUTION RESPIRATORY (INHALATION) EVERY 4 HOURS PRN
Qty: 75 ML | Refills: 0 | Status: SHIPPED | OUTPATIENT
Start: 2025-03-23 | End: 2026-03-23

## 2025-03-23 RX ORDER — BISACODYL 10 MG/1
10 SUPPOSITORY RECTAL DAILY PRN
Qty: 10 SUPPOSITORY | Refills: 0 | Status: SHIPPED | OUTPATIENT
Start: 2025-03-23

## 2025-03-23 RX ORDER — METHYLPREDNISOLONE 4 MG/1
TABLET ORAL
Qty: 21 EACH | Refills: 0 | Status: SHIPPED | OUTPATIENT
Start: 2025-03-23 | End: 2025-04-13

## 2025-03-23 RX ORDER — FAMOTIDINE 20 MG/1
20 TABLET, FILM COATED ORAL DAILY
Status: DISCONTINUED | OUTPATIENT
Start: 2025-03-23 | End: 2025-03-23 | Stop reason: HOSPADM

## 2025-03-23 RX ORDER — ACETAMINOPHEN 325 MG/1
650 TABLET ORAL EVERY 6 HOURS PRN
COMMUNITY
Start: 2025-03-23

## 2025-03-23 RX ADMIN — FINASTERIDE 5 MG: 5 TABLET, FILM COATED ORAL at 09:03

## 2025-03-23 RX ADMIN — CEFTRIAXONE SODIUM 1 G: 1 INJECTION, POWDER, FOR SOLUTION INTRAMUSCULAR; INTRAVENOUS at 10:03

## 2025-03-23 RX ADMIN — CARVEDILOL 3.12 MG: 3.12 TABLET, FILM COATED ORAL at 09:03

## 2025-03-23 RX ADMIN — FAMOTIDINE 20 MG: 20 TABLET, FILM COATED ORAL at 09:03

## 2025-03-23 RX ADMIN — IPRATROPIUM BROMIDE AND ALBUTEROL SULFATE 3 ML: 2.5; .5 SOLUTION RESPIRATORY (INHALATION) at 07:03

## 2025-03-23 RX ADMIN — METHYLPREDNISOLONE SODIUM SUCCINATE 40 MG: 40 INJECTION, POWDER, FOR SOLUTION INTRAMUSCULAR; INTRAVENOUS at 05:03

## 2025-03-23 RX ADMIN — AZITHROMYCIN DIHYDRATE 250 MG: 250 TABLET ORAL at 09:03

## 2025-03-23 RX ADMIN — ASPIRIN 81 MG: 81 TABLET, COATED ORAL at 09:03

## 2025-03-23 RX ADMIN — GUAIFENESIN 600 MG: 600 TABLET, EXTENDED RELEASE ORAL at 09:03

## 2025-03-23 NOTE — DISCHARGE SUMMARY
[facility]   [facility] Medicine Discharge Summary    Admit Date: 3/21/2025  Discharge Date and Time: 3/23/125203:55 AM  Admitting Physician: [unfilled]  Discharging Physician: Mau Oliva MD.  Primary Care Physician: Gabino Pimentel MD           Discharge Diagnoses:  Copd exacerbation  Leukocytosis  Cortez  Uti E coli   Hx Bph,htn,cad      HPI  79 year male with hx of HTN, BPH,  COPD, presents here for sob. Was seen at other campus yesterday and found to be hypoxic. He is breathless after ambulation and says sometimes legs give out b/c he becomes weak with ambulation. He was diagnosed with pneumonia yesterday at [facility] but left b/c he could not get water. Comes here today. No fever or chills, occ cough. Pt has quit smoking about a year ago      Hospital Course:   Pt admitted with copd exacerbation, uti E coli, received rocephin/zithromax for 3 days.  Will switch to bactrim to complete course.  Pt has no cough, f/c and has had no respiratory issues on room air.  He does have some desaturation at night to upper 80s and would benefit from a sleep study which he can discuss with his pcp.  Will also add mdp and duonebs.  Mild cortez resolved      Pt was seen and examined on the day of discharge.    Vitals:  VITAL SIGNS: 24 HRS MIN & MAX LAST   Temp  Min: 97.4 °F (36.3 °C)  Max: 98.2 °F (36.8 °C) 97.9 °F (36.6 °C)   BP  Min: 129/70  Max: 171/81 (!) 153/80   Pulse  Min: 63  Max: 79  72   Resp  Min: 14  Max: 20 18   SpO2  Min: 91 %  Max: 97 % 96 %       Physical Exam:  General: In no acute distress, afebrile  Chest: clear to auscultation bilaterally  Heart: RRR, +S1, S2, no appreciable murmur  Abdomen: Soft, nontender, BS +  MSK: Warm, no lower extremity edema, no clubbing or cyanosis  Neurologic: Alert and oriented x4  Psych/mental status: Appropriate mood and affect. Responds appropriately to questions.      Procedures Performed:   No admission procedures for  hospital encounter.      Significant Diagnostic Studies: See Full reports for all details    Recent Labs   Lab 03/21/25 1728 03/22/25  0557 03/23/25  0922   WBC 20.18* 21.62* 19.07*   RBC 4.52* 4.23* 4.36*   HGB 13.8* 12.8* 13.1*   HCT 41.0* 38.0* 39.6*   MCV 90.7 89.8 90.8   MCH 30.5 30.3 30.0   MCHC 33.7 33.7 33.1   RDW 13.6 13.9 14.2    182 205   MPV 9.9 10.3 9.8       Recent Labs   Lab 03/20/25  2253 03/21/25  0045 03/21/25 0628 03/21/25 1728 03/22/25  0557 03/23/25  0922   NA  --   --  138 141 140 139   K  --   --  4.5 3.9 4.6 4.2   CL  --   --  103 104 108* 106   CO2  --   --  25 24 23 26   BUN  --   --  19.2 28.1* 25.1 27.7*   CREATININE  --   --  1.17 1.45* 1.26* 1.18   CALCIUM  --   --  8.9 9.6 8.7* 8.6*   PH 7.160* 7.330*  --   --   --   --    MG  --   --  2.20  --   --   --    ALBUMIN  --   --  3.7 3.8 3.1*  --    ALKPHOS  --   --  75 70 57  --    ALT  --   --  28 23 19  --    AST  --   --  29 22 18  --    BILITOT  --   --  0.4 0.3 0.3  --         Microbiology Results (last 7 days)       ** No results found for the last 168 hours. **             X-Ray Chest 1 View  Narrative: EXAMINATION:  XR CHEST 1 VIEW    CLINICAL HISTORY:  shortness of breath;, .    COMPARISON:  June 4, 2023    FINDINGS:  No alveolar consolidation, effusion, or pneumothorax is seen.   The thoracic aorta is normal  cardiac silhouette, central pulmonary vessels and mediastinum are normal in size and are grossly unremarkable.   visualized osseous structures are grossly unremarkable.  Impression: No acute chest disease is identified.    Electronically signed by: Kevyn Mckeon  Date:    03/21/2025  Time:    08:12  CTA Chest Non-Coronary (PE Studies)  Narrative: EXAMINATION:  CTA CHEST NON CORONARY (PE STUDIES)    CLINICAL HISTORY:  Pulmonary embolism (PE) suspected, positive D-dimer;.  Short of breath.    TECHNIQUE:  Helical acquisition through the chest with IV contrast targeting the pulmonary arteries. Multiplanar and 3D  MIP reconstructed images were provided for review.   mGycm. Automatic exposure control, adjustment of mA/kV or iterative reconstruction technique was used to reduce radiation.    COMPARISON:  14 February 2025.    FINDINGS:  There is good opacification of the pulmonary arterial tree. There is no evidence of pulmonary thromboembolism.  There is no aortic dissection.    There is no mediastinal, hilar or axillary lymphadenopathy by size criteria.    Heart is mildly enlarged.  No pericardial effusion.  There are coronary artery calcifications.  There are changes of CABG.    No pleural effusion.  Moderate emphysema.  New opacities suspicious for pneumonia.  Similar scarring in the lingula.    No acute findings in the imaged upper abdomen.  No acute osseous findings.  Impression: Negative for pulmonary thromboembolic disease.  No acute findings in the chest.    Electronically signed by: Kong Davalos  Date:    03/21/2025  Time:    06:50         Medication List        START taking these medications      acetaminophen 325 MG tablet  Commonly known as: TYLENOL  Take 2 tablets (650 mg total) by mouth every 6 (six) hours as needed for Pain.     bisacodyL 10 mg Supp  Commonly known as: DULCOLAX  Place 1 suppository (10 mg total) rectally daily as needed.     methylPREDNISolone 4 mg tablet  Commonly known as: MEDROL DOSEPACK  use as directed     senna-docusate 8.6-50 mg 8.6-50 mg per tablet  Commonly known as: PERICOLACE  Take 1 tablet by mouth daily as needed for Constipation.     sulfamethoxazole-trimethoprim 400-80mg 400-80 mg per tablet  Commonly known as: BACTRIM,SEPTRA  Take 1 tablet by mouth 2 (two) times daily. for 4 days            CHANGE how you take these medications      * albuterol-ipratropium 2.5 mg-0.5 mg/3 mL nebulizer solution  Commonly known as: DUO-NEB  Take 3 mLs by nebulization every 6 (six) hours as needed for Wheezing. Rescue  What changed: Another medication with the same name was added. Make sure  you understand how and when to take each.     * albuterol-ipratropium 2.5 mg-0.5 mg/3 mL nebulizer solution  Commonly known as: DUO-NEB  Take 3 mLs by nebulization every 4 (four) hours as needed for Wheezing or Shortness of Breath. Rescue  What changed: You were already taking a medication with the same name, and this prescription was added. Make sure you understand how and when to take each.           * This list has 2 medication(s) that are the same as other medications prescribed for you. Read the directions carefully, and ask your doctor or other care provider to review them with you.                CONTINUE taking these medications      albuterol 90 mcg/actuation inhaler  Commonly known as: PROVENTIL/VENTOLIN HFA  Inhale 2 puffs into the lungs every 6 (six) hours as needed for Wheezing or Shortness of Breath.     carvediloL 6.25 MG tablet  Commonly known as: COREG  Take 0.5 tablets (3.125 mg total) by mouth 2 (two) times daily.     donepeziL 10 MG tablet  Commonly known as: ARICEPT  Take 1 tablet (10 mg total) by mouth every evening.     finasteride 5 mg tablet  Commonly known as: PROSCAR  Take 1 tablet (5 mg total) by mouth once daily.     levocetirizine 5 MG tablet  Commonly known as: XYZAL     rosuvastatin 10 MG tablet  Commonly known as: CRESTOR     tamsulosin 0.4 mg Cap  Commonly known as: FLOMAX     traZODone 150 MG tablet  Commonly known as: DESYREL  TAKE 1 TABLET(150 MG) BY MOUTH EVERY EVENING     TRUE METRIX GLUCOSE TEST STRIP Strp  Generic drug: blood sugar diagnostic  TEST BLOOD SUGAR EVERY DAY     TRUEPLUS LANCETS 33 gauge Misc  Generic drug: lancets  TEST BLOOD SUGAR EVERY DAY            STOP taking these medications      UNABLE TO FIND            ASK your doctor about these medications      alendronate 70 MG tablet  Commonly known as: FOSAMAX  Take 1 tablet (70 mg total) by mouth every 7 days. Must be taken with 6-8 oz of water. Do not eat or take other medications for 30 minutes. Do not lay down  for 30 minutes after taking.     aspirin 81 MG EC tablet  Commonly known as: ECOTRIN     multivitamin per tablet  Commonly known as: THERAGRAN     nicotine polacrilex 2 MG Lozg  Take 1 lozenge (2 mg total) by mouth as needed (Do not exceed more than 10 pieces in 24 hours).               Where to Get Your Medications        These medications were sent to [id number] - [place] - [address] [facility] [address] [facility] [place]  [address] [place] [address]      Phone: [phone]   albuterol 90 mcg/actuation inhaler  albuterol-ipratropium 2.5 mg-0.5 mg/3 mL nebulizer solution  bisacodyL 10 mg Supp  methylPREDNISolone 4 mg tablet  senna-docusate 8.6-50 mg 8.6-50 mg per tablet  sulfamethoxazole-trimethoprim 400-80mg 400-80 mg per tablet       You can get these medications from any pharmacy    You don't need a prescription for these medications  acetaminophen 325 MG tablet          Explained in detail to the patient the discharge plan, medications, and follow-up visits. Pt understands and agrees with the treatment plan.  Discharge Disposition: Home or Self Care  Discharged Condition: stable  Diet-   Dietary Orders (From admission, onward)       Start     Ordered    03/22/25 1432  Diet Consistent Carbohydrate 2000 Calories (up to 75 gm per meal); Standard Tray  Diet effective now        Question Answer Comment   Total calories / carbs: 2000 Calories (up to 75 gm per meal)    Tray type: Standard Tray        03/22/25 1431                   Medications Per DC med rec  Activities as tolerated   Follow-up Information       Gabino Pimentel MD Follow up in 1 week(s).    Specialty: Internal Medicine  Why: rec outpt sleep study  Contact information:  [address] [place] [address] [phone]                           For further questions contact hospitalist office.    Discharge time >30 minutes    For worsening symptoms, chest pain, shortness of  breath, increased abdominal pain, high grade fever, stroke or stroke like symptoms, immediately go to the nearest Emergency Room or call 911 as soon as possible.    Social Drivers of Health     Tobacco Use: Medium Risk (3/21/2025)    Patient History     Smoking Tobacco Use: Former     Smokeless Tobacco Use: Never     Passive Exposure: Past   Alcohol Use: Not At Risk (6/12/2024)    AUDIT-C     Frequency of Alcohol Consumption: Never     Average Number of Drinks: Patient does not drink     Frequency of Binge Drinking: Never   Financial Resource Strain: Low Risk  (3/23/2025)    Overall Financial Resource Strain (CARDIA)     Difficulty of Paying Living Expenses: Not hard at all   Food Insecurity: No Food Insecurity (3/23/2025)    Hunger Vital Sign     Worried About Running Out of Food in the Last Year: Never true     Ran Out of Food in the Last Year: Never true   Transportation Needs: No Transportation Needs (6/12/2024)    TRANSPORTATION NEEDS     Transportation : No   Physical Activity: Insufficiently Active (6/12/2024)    Exercise Vital Sign     Days of Exercise per Week: 3 days     Minutes of Exercise per Session: 30 min   Stress: Stress Concern Present (3/23/2025)    [employer] - Occupational Stress Questionnaire     Feeling of Stress : Rather much   Housing Stability: Low Risk  (3/23/2025)    Housing Stability Vital Sign     Unable to Pay for Housing in the Last Year: No     Number of Times Moved in the Last Year: Not on file     Homeless in the Last Year: No   Depression: Low Risk  (3/18/2025)    Depression     Last PHQ-4: Flowsheet Data: 0   Utilities: Not At Risk (3/23/2025)    [facility] Utilities     Threatened with loss of utilities: No   Health Literacy: Inadequate Health Literacy (3/23/2025)     Health Literacy     Frequency of need for help with medical instructions: Sometimes   Social Isolation: Socially Integrated (6/12/2024)    Social Isolation     Social Isolation: 1          Mau Sahu M.D, on 3/23/2025. at 10:55 AM.

## 2025-03-23 NOTE — PLAN OF CARE
Problem: Adult Inpatient Plan of Care  Goal: Plan of Care Review  Outcome: Progressing  Goal: Patient-Specific Goal (Individualized)  Outcome: Progressing  Goal: Absence of Hospital-Acquired Illness or Injury  Outcome: Progressing  Goal: Optimal Comfort and Wellbeing  Outcome: Progressing  Goal: Readiness for Transition of Care  Outcome: Progressing     Problem: COPD (Chronic Obstructive Pulmonary Disease)  Goal: Optimal Chronic Illness Coping  Outcome: Progressing  Goal: Optimal Level of Functional [place]  Outcome: Progressing  Goal: Absence of Infection Signs and Symptoms  Outcome: Progressing  Goal: Improved Oral Intake  Outcome: Progressing  Goal: Effective Oxygenation and Ventilation  Outcome: Progressing     Problem: Fall Injury Risk  Goal: Absence of Fall and Fall-Related Injury  Outcome: Progressing     Problem: Infection  Goal: Absence of Infection Signs and Symptoms  Outcome: Progressing     Problem: Fatigue  Goal: Improved Activity Tolerance  Outcome: Progressing     Problem: Pneumonia  Goal: Fluid Balance  Outcome: Progressing  Goal: Resolution of Infection Signs and Symptoms  Outcome: Progressing  Goal: Effective Oxygenation and Ventilation  Outcome: Progressing

## 2025-03-23 NOTE — NURSING
Rx: sent to preferred pharmacy    Educated on f/u, meds, home care, when to return to ed, etc. See AVS for specifics. Questions/concerns addressed. Stable and ready for discharge.

## 2025-03-23 NOTE — PLAN OF CARE
Problem: Adult Inpatient Plan of Care  Goal: Plan of Care Review  3/23/2025 0550 by Suzanne Garcia RN  Outcome: Progressing  3/23/2025 0014 by Suzanne Garcia RN  Outcome: Progressing  Goal: Patient-Specific Goal (Individualized)  3/23/2025 0550 by Suzanne Garcia RN  Outcome: Progressing  3/23/2025 0014 by Suzanne Garcia RN  Outcome: Progressing  Goal: Absence of Hospital-Acquired Illness or Injury  3/23/2025 0550 by Suzanne Garcia RN  Outcome: Progressing  3/23/2025 0014 by Suzanne Garcia RN  Outcome: Progressing  Goal: Optimal Comfort and Wellbeing  3/23/2025 0550 by Suzanne Garcia RN  Outcome: Progressing  3/23/2025 0014 by Suzanne Garcia RN  Outcome: Progressing  Goal: Readiness for Transition of Care  3/23/2025 0550 by Suzanne Garcia RN  Outcome: Progressing  3/23/2025 0014 by Suzanne Garcia RN  Outcome: Progressing     Problem: COPD (Chronic Obstructive Pulmonary Disease)  Goal: Optimal Chronic Illness Coping  3/23/2025 0550 by Suzanne Garcia RN  Outcome: Progressing  3/23/2025 0014 by Suzanne Garcia RN  Outcome: Progressing  Goal: Optimal Level of Functional [place]  3/23/2025 0550 by Suzanne Garcia RN  Outcome: Progressing  3/23/2025 0014 by Suzanne Garcia RN  Outcome: Progressing  Goal: Absence of Infection Signs and Symptoms  3/23/2025 0550 by Suzanne Garcia RN  Outcome: Progressing  3/23/2025 0014 by Suzanne Garcia RN  Outcome: Progressing  Goal: Improved Oral Intake  3/23/2025 0550 by Suzanne Garcia RN  Outcome: Progressing  3/23/2025 0014 by Suzanne Garcia RN  Outcome: Progressing  Goal: Effective Oxygenation and Ventilation  3/23/2025 0550 by Suzanne Garcia RN  Outcome: Progressing  3/23/2025 0014 by Suzanne Garcia, RN  Outcome: Progressing     Problem: Fall  Injury Risk  Goal: Absence of Fall and Fall-Related Injury  3/23/2025 0550 by Suzanne Garcia RN  Outcome: Progressing  3/23/2025 0014 by Suzanne Garcia RN  Outcome: Progressing     Problem: Infection  Goal: Absence of Infection Signs and Symptoms  3/23/2025 0550 by Suzanne Garcia RN  Outcome: Progressing  3/23/2025 0014 by Suzanne Garcia RN  Outcome: Progressing     Problem: Fatigue  Goal: Improved Activity Tolerance  3/23/2025 0550 by Suzanne Garcia RN  Outcome: Progressing  3/23/2025 0014 by Suzanne Garcia RN  Outcome: Progressing     Problem: Pneumonia  Goal: Fluid Balance  3/23/2025 0550 by Suzanne Garcia RN  Outcome: Progressing  3/23/2025 0014 by Suzanne Garcia RN  Outcome: Progressing  Goal: Resolution of Infection Signs and Symptoms  3/23/2025 0550 by Suzanne Garcia RN  Outcome: Progressing  3/23/2025 0014 by Suzanne Garcia RN  Outcome: Progressing  Goal: Effective Oxygenation and Ventilation  3/23/2025 0550 by Suzanne Garcia RN  Outcome: Progressing  3/23/2025 0014 by Suzanne Garcia RN  Outcome: Progressing

## 2025-03-24 ENCOUNTER — PATIENT OUTREACH (OUTPATIENT)
Dept: ADMINISTRATIVE | Facility: CLINIC | Age: 80
End: 2025-03-24
Payer: MEDICARE

## 2025-03-24 LAB
OHS QRS DURATION: 104 MS
OHS QRS DURATION: 106 MS
OHS QTC CALCULATION: 463 MS
OHS QTC CALCULATION: 472 MS

## 2025-03-24 NOTE — PROGRESS NOTES
C3 nurse attempted to contact Gustavo Lanza for a TCC post hospital discharge follow up call. No answer. Left voicemail with callback information. The patient does not have a scheduled HOSFU appointment. Message sent to PCP staff for assistance with scheduling visit with patient.

## 2025-03-24 NOTE — PROGRESS NOTES
C3 nurse spoke with Gustavo Lanza for a TCC post hospital discharge follow up call. The patient noted that he saw a colleague of his PCP this morning for a HOSFU and was advised to complete medications and return in 6 months, unless he has any further issues.     He is unsure of his medications and was able to somewhat review them, but C3 nurse advised to bring his medications to his next appointment to assure that his medications correlate with what is in his chart and he voiced understanding.

## 2025-03-26 LAB
BACTERIA BLD CULT: NORMAL
BACTERIA BLD CULT: NORMAL

## 2025-04-07 ENCOUNTER — TELEPHONE (OUTPATIENT)
Dept: SMOKING CESSATION | Facility: CLINIC | Age: 80
End: 2025-04-07
Payer: MEDICARE

## 2025-04-29 ENCOUNTER — TELEPHONE (OUTPATIENT)
Dept: SMOKING CESSATION | Facility: CLINIC | Age: 80
End: 2025-04-29
Payer: MEDICARE

## 2025-04-30 ENCOUNTER — TELEPHONE (OUTPATIENT)
Dept: PRIMARY CARE CLINIC | Facility: CLINIC | Age: 80
End: 2025-04-30
Payer: MEDICARE

## 2025-06-16 DIAGNOSIS — N40.1 BENIGN PROSTATIC HYPERPLASIA WITH LOWER URINARY TRACT SYMPTOMS, SYMPTOM DETAILS UNSPECIFIED: Primary | Chronic | ICD-10-CM

## 2025-06-16 RX ORDER — TAMSULOSIN HYDROCHLORIDE 0.4 MG/1
0.4 CAPSULE ORAL DAILY
Qty: 90 CAPSULE | Refills: 1 | Status: SHIPPED | OUTPATIENT
Start: 2025-06-16

## 2025-06-25 ENCOUNTER — TELEPHONE (OUTPATIENT)
Dept: VASCULAR SURGERY | Facility: CLINIC | Age: 80
End: 2025-06-25
Payer: MEDICARE

## 2025-06-26 ENCOUNTER — HOSPITAL ENCOUNTER (EMERGENCY)
Facility: HOSPITAL | Age: 80
Discharge: HOME | End: 2025-06-26
Attending: EMERGENCY MEDICINE
Payer: MEDICARE

## 2025-06-26 VITALS
BODY MASS INDEX: 19.6 KG/M2 | RESPIRATION RATE: 18 BRPM | SYSTOLIC BLOOD PRESSURE: 170 MMHG | TEMPERATURE: 98 F | HEIGHT: 71 IN | WEIGHT: 140 LBS | DIASTOLIC BLOOD PRESSURE: 85 MMHG | OXYGEN SATURATION: 99 % | HEART RATE: 65 BPM

## 2025-06-26 DIAGNOSIS — J44.9 CHRONIC OBSTRUCTIVE PULMONARY DISEASE, UNSPECIFIED COPD TYPE: ICD-10-CM

## 2025-06-26 DIAGNOSIS — R53.1 GENERALIZED WEAKNESS: Primary | ICD-10-CM

## 2025-06-26 DIAGNOSIS — R06.02 SOB (SHORTNESS OF BREATH): ICD-10-CM

## 2025-06-26 LAB
ALBUMIN SERPL-MCNC: 3.5 G/DL (ref 3.4–4.8)
ALBUMIN/GLOB SERPL: 1.1 RATIO (ref 1.1–2)
ALP SERPL-CCNC: 47 UNIT/L (ref 40–150)
ALT SERPL-CCNC: 12 UNIT/L (ref 0–55)
ANION GAP SERPL CALC-SCNC: 9 MEQ/L
AST SERPL-CCNC: 19 UNIT/L (ref 11–45)
BASOPHILS # BLD AUTO: 0.05 X10(3)/MCL
BASOPHILS NFR BLD AUTO: 0.8 %
BILIRUB SERPL-MCNC: 0.6 MG/DL
BILIRUB UR QL STRIP.AUTO: NEGATIVE
BNP BLD-MCNC: 412.4 PG/ML
BUN SERPL-MCNC: 15.4 MG/DL (ref 8.4–25.7)
CALCIUM SERPL-MCNC: 8.9 MG/DL (ref 8.8–10)
CHLORIDE SERPL-SCNC: 104 MMOL/L (ref 98–107)
CLARITY UR: CLEAR
CO2 SERPL-SCNC: 30 MMOL/L (ref 23–31)
COLOR UR AUTO: YELLOW
CREAT SERPL-MCNC: 1.17 MG/DL (ref 0.72–1.25)
CREAT/UREA NIT SERPL: 13
EOSINOPHIL # BLD AUTO: 0.12 X10(3)/MCL (ref 0–0.9)
EOSINOPHIL NFR BLD AUTO: 1.8 %
ERYTHROCYTE [DISTWIDTH] IN BLOOD BY AUTOMATED COUNT: 13.3 % (ref 11.5–17)
FLUAV AG UPPER RESP QL IA.RAPID: NOT DETECTED
FLUBV AG UPPER RESP QL IA.RAPID: NOT DETECTED
GFR SERPLBLD CREATININE-BSD FMLA CKD-EPI: >60 ML/MIN/1.73/M2
GLOBULIN SER-MCNC: 3.2 GM/DL (ref 2.4–3.5)
GLUCOSE SERPL-MCNC: 99 MG/DL (ref 82–115)
GLUCOSE UR QL STRIP: NEGATIVE
HCT VFR BLD AUTO: 40.2 % (ref 42–52)
HGB BLD-MCNC: 13.4 G/DL (ref 14–18)
HGB UR QL STRIP: NEGATIVE
IMM GRANULOCYTES # BLD AUTO: 0.01 X10(3)/MCL (ref 0–0.04)
IMM GRANULOCYTES NFR BLD AUTO: 0.2 %
KETONES UR QL STRIP: NEGATIVE
LEUKOCYTE ESTERASE UR QL STRIP: NEGATIVE
LYMPHOCYTES # BLD AUTO: 1.89 X10(3)/MCL (ref 0.6–4.6)
LYMPHOCYTES NFR BLD AUTO: 28.6 %
MCH RBC QN AUTO: 30.5 PG (ref 27–31)
MCHC RBC AUTO-ENTMCNC: 33.3 G/DL (ref 33–36)
MCV RBC AUTO: 91.6 FL (ref 80–94)
MONOCYTES # BLD AUTO: 0.71 X10(3)/MCL (ref 0.1–1.3)
MONOCYTES NFR BLD AUTO: 10.8 %
NEUTROPHILS # BLD AUTO: 3.82 X10(3)/MCL (ref 2.1–9.2)
NEUTROPHILS NFR BLD AUTO: 57.8 %
NITRITE UR QL STRIP: NEGATIVE
NRBC BLD AUTO-RTO: 0 %
OHS QRS DURATION: 98 MS
OHS QTC CALCULATION: 439 MS
PH UR STRIP: 7 [PH]
PLATELET # BLD AUTO: 153 X10(3)/MCL (ref 130–400)
PMV BLD AUTO: 9.8 FL (ref 7.4–10.4)
POTASSIUM SERPL-SCNC: 4.2 MMOL/L (ref 3.5–5.1)
PROT SERPL-MCNC: 6.7 GM/DL (ref 5.8–7.6)
PROT UR QL STRIP: NEGATIVE
RBC # BLD AUTO: 4.39 X10(6)/MCL (ref 4.7–6.1)
SARS-COV-2 RNA RESP QL NAA+PROBE: NOT DETECTED
SODIUM SERPL-SCNC: 143 MMOL/L (ref 136–145)
SP GR UR STRIP.AUTO: 1.01 (ref 1–1.03)
TROPONIN I SERPL-MCNC: <0.01 NG/ML (ref 0–0.04)
UROBILINOGEN UR STRIP-ACNC: 1
WBC # BLD AUTO: 6.6 X10(3)/MCL (ref 4.5–11.5)

## 2025-06-26 PROCEDURE — 81003 URINALYSIS AUTO W/O SCOPE: CPT | Performed by: EMERGENCY MEDICINE

## 2025-06-26 PROCEDURE — 25000242 PHARM REV CODE 250 ALT 637 W/ HCPCS

## 2025-06-26 PROCEDURE — 93005 ELECTROCARDIOGRAM TRACING: CPT

## 2025-06-26 PROCEDURE — 94761 N-INVAS EAR/PLS OXIMETRY MLT: CPT

## 2025-06-26 PROCEDURE — 93010 ELECTROCARDIOGRAM REPORT: CPT | Mod: ,,, | Performed by: INTERNAL MEDICINE

## 2025-06-26 PROCEDURE — 0240U COVID/FLU A&B PCR: CPT | Performed by: EMERGENCY MEDICINE

## 2025-06-26 PROCEDURE — 83880 ASSAY OF NATRIURETIC PEPTIDE: CPT

## 2025-06-26 PROCEDURE — 99285 EMERGENCY DEPT VISIT HI MDM: CPT | Mod: 25

## 2025-06-26 PROCEDURE — 84484 ASSAY OF TROPONIN QUANT: CPT | Performed by: EMERGENCY MEDICINE

## 2025-06-26 PROCEDURE — 85025 COMPLETE CBC W/AUTO DIFF WBC: CPT

## 2025-06-26 PROCEDURE — 94640 AIRWAY INHALATION TREATMENT: CPT

## 2025-06-26 PROCEDURE — 99900031 HC PATIENT EDUCATION (STAT)

## 2025-06-26 PROCEDURE — 80053 COMPREHEN METABOLIC PANEL: CPT

## 2025-06-26 RX ORDER — IPRATROPIUM BROMIDE AND ALBUTEROL SULFATE 2.5; .5 MG/3ML; MG/3ML
3 SOLUTION RESPIRATORY (INHALATION)
Status: COMPLETED | OUTPATIENT
Start: 2025-06-26 | End: 2025-06-26

## 2025-06-26 RX ADMIN — IPRATROPIUM BROMIDE AND ALBUTEROL SULFATE 3 ML: 2.5; .5 SOLUTION RESPIRATORY (INHALATION) at 12:06

## 2025-06-26 NOTE — ED PROVIDER NOTES
Encounter Date: 6/26/2025       History     Chief Complaint   Patient presents with    Shortness of Breath     SOB x 5 weeks. Hx of COPD. Unrelieved by inhaler. Worsened with activity. Denies cough/congestion     The history is provided by the patient.   Shortness of Breath  This is a chronic problem. The average episode lasts 3 years. The problem occurs intermittently.The problem has not changed since onset.Pertinent negatives include no fever, no sore throat, no chest pain and no rash.   Felt weak today and states he couldn't get out of bed.  Denies cough but notes rhinorrhea x 2 months.  States his doctors can't tell him why he is so weak.  Quit smoking 6 months ago.    Review of patient's allergies indicates:   Allergen Reactions    Nitrofurantoin monohyd/m-cryst      Past Medical History:   Diagnosis Date    AAA (abdominal aortic aneurysm)     Abdominal aortic aneurysm, without rupture, unspecified     Acute bilateral low back pain without sciatica     Aneurysm of descending thoracic aorta without rupture     Atherosclerosis of arteries of extremities     Benign prostatic hyperplasia without lower urinary tract symptoms     BPH (benign prostatic hyperplasia) 08/03/2022    Carotid artery stenosis     Chronic idiopathic constipation 08/03/2022    Compression fracture of L1 vertebra, initial encounter     COPD (chronic obstructive pulmonary disease)     Coronary artery disease involving native coronary artery of native heart without angina pectoris 08/03/2022    Disorder of kidney and ureter     Hyperlipidemia, unspecified     Impaired fasting glucose 08/03/2022    Leg weakness, bilateral     Mixed hyperlipidemia 08/03/2022    Neural foraminal stenosis of lumbar spine     Nonrheumatic mitral (valve) stenosis     Occlusion and stenosis of bilateral carotid arteries     Peripheral vascular disease, unspecified     Presence of other vascular implants and grafts     Primary insomnia     Raynaud disease 08/03/2022     S/P AAA repair 08/03/2022    Solitary pulmonary nodule     Spinal stenosis of lumbar region with neurogenic claudication     Spinal stenosis, lumbosacral region     Stage 3a chronic kidney disease      Past Surgical History:   Procedure Laterality Date    ABDOMINAL AORTIC ANEURYSM REPAIR  03/13/2020    Surgeon: Dr. Ovalles    AORTOGRAM W/ BLE RUNOFF Bilateral 05/30/2024    Mg Cullen MD    CARDIAC SURGERY  01/30/2014    CABG    COLONOSCOPY  11/12/2021    CARLOS MOHAMUD MD    HERNIA REPAIR Left 03/23/2016    PERIPHERAL ANGIOGRAPHY  12/18/2015    Femoral Popliteal revas w/stent    REPAIR OF HEART VALVE       Family History   Problem Relation Name Age of Onset    No Known Problems Mother      No Known Problems Father      No Known Problems Sister      No Known Problems Brother       Social History[1]  Review of Systems   Constitutional:  Negative for fever.   HENT:  Negative for sore throat.    Respiratory:  Positive for shortness of breath.    Cardiovascular:  Negative for chest pain.   Gastrointestinal:  Negative for nausea.   Genitourinary:  Negative for dysuria.   Musculoskeletal:  Negative for back pain.   Skin:  Negative for rash.   Neurological:  Negative for weakness.   Hematological:  Does not bruise/bleed easily.       Physical Exam     Initial Vitals   BP Pulse Resp Temp SpO2   06/26/25 1242 06/26/25 1242 06/26/25 1242 06/26/25 1251 06/26/25 1242   (!) 142/79 65 20 97.7 °F (36.5 °C) 96 %      MAP       --                Physical Exam    Nursing note and vitals reviewed.  Constitutional: He appears well-developed and well-nourished.   Cachectic-appearing   HENT:   Head: Normocephalic and atraumatic.   Right Ear: External ear normal.   Left Ear: External ear normal.   Nose: Nose normal.   Eyes: Conjunctivae and EOM are normal. Pupils are equal, round, and reactive to light.   Neck: Neck supple.   Normal range of motion.  Cardiovascular:  Normal rate, normal heart sounds and intact distal pulses. An irregular  rhythm present.           Pulmonary/Chest: He has rales in the right middle field and the right lower field.   Abdominal: Abdomen is soft. Bowel sounds are normal.   Musculoskeletal:         General: Normal range of motion.      Cervical back: Normal range of motion and neck supple.     Neurological: He is alert and oriented to person, place, and time. He has normal strength. GCS score is 15. GCS eye subscore is 4. GCS verbal subscore is 5. GCS motor subscore is 6.   Skin: Skin is warm and dry. Capillary refill takes less than 2 seconds.   Psychiatric: He has a normal mood and affect. His behavior is normal. Judgment and thought content normal.         ED Course   Procedures  Labs Reviewed   B-TYPE NATRIURETIC PEPTIDE - Abnormal       Result Value    Natriuretic Peptide 412.4 (*)    CBC WITH DIFFERENTIAL - Abnormal    WBC 6.60      RBC 4.39 (*)     Hgb 13.4 (*)     Hct 40.2 (*)     MCV 91.6      MCH 30.5      MCHC 33.3      RDW 13.3      Platelet 153      MPV 9.8      Neut % 57.8      Lymph % 28.6      Mono % 10.8      Eos % 1.8      Basophil % 0.8      Imm Grans % 0.2      Neut # 3.82      Lymph # 1.89      Mono # 0.71      Eos # 0.12      Baso # 0.05      Imm Gran # 0.01      NRBC% 0.0     TROPONIN I - Normal    Troponin-I <0.010     COVID/FLU A&B PCR - Normal    Influenza A PCR Not Detected      Influenza B PCR Not Detected      SARS-CoV-2 PCR Not Detected      Narrative:     The Xpert Xpress SARS-CoV-2/FLU/RSV plus is a rapid, multiplexed real-time PCR test intended for the simultaneous qualitative detection and differentiation of SARS-CoV-2, Influenza A, Influenza B, and respiratory syncytial virus (RSV) viral RNA in either nasopharyngeal swab or nasal swab specimens.         URINALYSIS, REFLEX TO URINE CULTURE - Normal    Color, UA Yellow      Appearance, UA Clear      Specific Gravity, UA 1.015      pH, UA 7.0      Protein, UA Negative      Glucose, UA Negative      Ketones, UA Negative      Blood, UA  Negative      Bilirubin, UA Negative      Urobilinogen, UA 1.0      Nitrites, UA Negative      Leukocyte Esterase, UA Negative     CBC W/ AUTO DIFFERENTIAL    Narrative:     The following orders were created for panel order CBC Auto Differential.  Procedure                               Abnormality         Status                     ---------                               -----------         ------                     CBC with Differential[[id number]]       Abnormal            Final result                 Please view results for these tests on the individual orders.   COMPREHENSIVE METABOLIC PANEL    Sodium 143      Potassium 4.2      Chloride 104      CO2 30      Glucose 99      Blood Urea Nitrogen 15.4      Creatinine 1.17      Calcium 8.9      Protein Total 6.7      Albumin 3.5      Globulin 3.2      Albumin/Globulin Ratio 1.1      Bilirubin Total 0.6      ALP 47      ALT 12      AST 19      eGFR >60      Anion Gap 9.0      BUN/Creatinine Ratio 13       EKG Readings: (Independently Interpreted)   Initial Reading: No STEMI. Rhythm: Normal Sinus Rhythm. Heart Rate: 69. Ectopy: PVCs. Conduction: Normal. ST Segments: Normal ST Segments. T Waves: Normal. Axis: Right Axis Deviation. Clinical Impression: Normal Sinus Rhythm with PVCs Other Impression: poor R-wave progression     ECG Results              EKG 12-lead (In process)        Collection Time Result Time QRS Duration OHS QTC Calculation    06/26/25 12:41:35 06/26/25 12:51:35 98 439                     In process by Interface, Lab In [facility] (06/26/25 12:51:41)                   Narrative:    Test Reason : R06.02,    Vent. Rate :  69 BPM     Atrial Rate :  69 BPM     P-R Int : 184 ms          QRS Dur :  98 ms      QT Int : 410 ms       P-R-T Axes :  94 103  46 degrees    QTcB Int : 439 ms    Sinus rhythm with occasional Premature ventricular complexes  Rightward axis  Anterior infarct (cited on or before 20-Mar-2025)  Abnormal ECG  When compared with ECG of  21-Mar-2025 17:07,  Premature ventricular complexes are now Present    Referred By: AAAREFERRAL SELF           Confirmed By:                                   Imaging Results              X-Ray Chest PA And Lateral (Final result)  Result time 06/26/25 13:42:48      Final result by Sathya Salmon MD (06/26/25 13:42:48)                   Impression:      No acute cardiopulmonary process identified.      Electronically signed by: Sathya Salmon  Date:    06/26/2025  Time:    13:42               Narrative:    EXAMINATION:  XR CHEST PA AND LATERAL    CLINICAL HISTORY:  SOB;    TECHNIQUE:  Two-view    COMPARISON:  March 20, 2020.    FINDINGS:  Cardiopericardial silhouette is within normal limits.  Sternotomy changes.  Hyperinflated lungs with chronic interstitial changes.  No convincing acute focal or segmental consolidation, congestion, pleural effusion or pneumothorax.                                       Medications   albuterol-ipratropium 2.5 mg-0.5 mg/3 mL nebulizer solution 3 mL (3 mLs Nebulization Given 6/26/25 1255)     Medical Decision Making  Amount and/or Complexity of Data Reviewed  Labs: ordered. Decision-making details documented in ED Course.  Radiology: ordered and independent interpretation performed. Decision-making details documented in ED Course.  ECG/medicine tests: ordered and independent interpretation performed. Decision-making details documented in ED Course.    Risk  Prescription drug management.    Differential includes:  COPD flare, infectious process, CHF, PTX, dehydration, electrolyte disturbance, anemia, angina.  Will obtain CBC, CMP, BNP, troponin, COVID/flu, UA, EKG, CXR and give DuoNeb.                                  Clinical Impression:  Final diagnoses:  [R06.02] SOB (shortness of breath)  [R53.1] Generalized weakness (Primary)  [J44.9] Chronic obstructive pulmonary disease, unspecified COPD type          ED Disposition Condition    Discharge Stable          ED Prescriptions     None       Follow-up Information       Follow up With Specialties Details Why Contact Info    Artur Pimentel MD Family Medicine Schedule an appointment as soon as possible for a visit   [address] [place] [address]  Jamari [place] [id number]  [phone]                     [1]   Social History  Tobacco Use    Smoking status: Former     Current packs/day: 0.00     Average packs/day: 0.9 packs/day for 58.5 years (54.6 ttl pk-yrs)     Types: Cigarettes     Start date:      Quit date: 10/4/2024     Years since quittin.7     Passive exposure: Past    Smokeless tobacco: Never    Tobacco comments:     Quit 10/4/24   Substance Use Topics    Alcohol use: Never    Drug use: Never        Richar Wilson MD  [phone]

## 2025-07-09 ENCOUNTER — TELEPHONE (OUTPATIENT)
Dept: PRIMARY CARE CLINIC | Facility: CLINIC | Age: 80
End: 2025-07-09
Payer: MEDICARE

## 2025-07-09 DIAGNOSIS — F51.01 PRIMARY INSOMNIA: ICD-10-CM

## 2025-07-09 RX ORDER — TRAZODONE HYDROCHLORIDE 150 MG/1
150 TABLET ORAL NIGHTLY
Qty: 90 TABLET | Refills: 1 | Status: SHIPPED | OUTPATIENT
Start: 2025-07-09

## 2025-07-09 NOTE — TELEPHONE ENCOUNTER
Copied from CRM #[id number]. Topic: Medications - New Medication Request  >> Jul 9, 2025  8:59 AM Stacy wrote:  .Who Called: Gustavo Lanza    Refill or New Rx:New Rx  RX Name and Strength:traZODone (DESYREL) 150 MG tablet  How is the patient currently taking it? (ex. 1XDay):Sig - Route: TAKE 1 TABLET(150 MG) BY MOUTH EVERY EVENING - Oral  Is this a 30 day or 90 day RX:90  Local or Mail Order:Local  List of preferred pharmacies on file (remove unneeded): [id number] - [place], [place] - [address] AT [facility] [address]   Phone: [phone]  Fax: [phone]        Ordering Provider:Dr. Pimentel      Preferred Method of Contact: Phone Call    Patient's Preferred Phone Number on File: [phone]     Best Call Back Number, if different:    Additional Information: N/A

## 2025-07-09 NOTE — PROGRESS NOTES
"    [facility] Vascular - Clinic Note  Gabriela Luna MD      Patient Name: Gustavo Lanza                   : 1945      MRN: [id number]   Visit Date: 7/10/2025       History Present Illness     Reason for Visit: Abdominal Aortic Aneurysm    Mr. Lanza presents to the clinic for 1 year surveillance of his abdominal aortic aneurysm s/p repair in  and descending thoracic aortic aneurysm. Aorta duplex obtained today.  CTA chest was done in March during hospitalization for COPD exacerbation. He denies acute onset of abdominal or back pain.     He additionally has some concerns of leg pain. Previously discussed coordination with his PCP to evaluate contribution from his low back. He has had neurosurgery follow up with Dr. Espana. He did present to [facility] in August after our last visit regarding his leg symptoms. He is s/p right superficial femoral artery atherectomy on 24. TERE and arterial ultrasounds were obtained in March. He is scheduled for follow up with Dr. Mcguire in September. He states he is having a lot of falls which has been present for him over the last 3 years.     His primary cardiologist is Dr. Jagdish Kelly.  He reports having a carotid ultrasound completed with him in March.    He has maintain complete smoking cessation for 1 year.      REVIEW OF SYSTEMS:  Review of systems conducted, negative except as stated in the history of present illness. See HPI for details.        Physical Exam      Vitals:    07/10/25 0856 07/10/25 0858   BP: 113/75 123/74   BP Location: Left arm Right arm   Patient Position: Sitting Sitting   Pulse: 77 71   Weight: 65.8 kg (145 lb)    Height: 5' 6" (1.676 m)           General: well-nourished, no acute distress, healthy appearing, and thin, ambulating normally, alert, pleasant, conversant, and oriented  Neurologic: cranial nerves are grossly intact, no neurologic deficits, no motor deficits, and no sensory deficits  HEENT: grossly normal and no scleral " icterus  Neck/Chest: normal  and soft without lymphadenopathy  Respiratory: breathing easily and without respiratory distress  Abdomen: normal, soft, and palpable pulsatile mass  Cardiology: regular rate and rhythm    Upper Extremity Arterial Exam:   Right - radial is palpable  Left - radial is palpable    Lower Extremity Arterial Exam:  Right - posterior tibial is palpable and dorsalis pedis is non-palpable  Left - posterior tibial is palpable and dorsalis pedis is non-palpable    Musculoskeletal:   Lower Extremity: no edema present to bilateral lower extremities          Assessment and Plan     Mr. Lanza is a 79 y.o. with an abdominal and descending thoracic aortic aneurysms.  He  is s/p endovascular AAA repair in 2020. He denies acute back or abdominal pain. His previous CTA abdomen/pelvis obtained 5/10/24 demonstrates a 4.6 cm AAA. Duplex obtained today reveals stable aneurysm measuring approximately 4.7 cm without evidence of endoleak. CTA chest demonstrates stable TAA measuring approximately 4.1 cm ( compared again similar images from 5/2024).    Recommend 1 year follow up with abdominal duplex.    Discussed with him again today the need to consolidate his care with one vascular surgeon (Told him that we are fine with whoever he chooses but no need to have two vascular surgeons - this can cause some confusion in care responsibility and follow up management).       1. Infrarenal abdominal aortic aneurysm (AAA) without rupture  - CV Abdominal Aorta; Future    2. Aneurysm of descending thoracic aorta without rupture    3. S/P AAA repair            Medical History     Past Medical History:   Diagnosis Date    AAA (abdominal aortic aneurysm)     Abdominal aortic aneurysm, without rupture, unspecified     Acute bilateral low back pain without sciatica     Aneurysm of descending thoracic aorta without rupture     Atherosclerosis of arteries of extremities     Benign prostatic hyperplasia without lower urinary  tract symptoms     BPH (benign prostatic hyperplasia) 08/03/2022    Carotid artery stenosis     Chronic idiopathic constipation 08/03/2022    Compression fracture of L1 vertebra, initial encounter     COPD (chronic obstructive pulmonary disease)     Coronary artery disease involving native coronary artery of native heart without angina pectoris 08/03/2022    Disorder of kidney and ureter     Hyperlipidemia, unspecified     Impaired fasting glucose 08/03/2022    Leg weakness, bilateral     Mixed hyperlipidemia 08/03/2022    Neural foraminal stenosis of lumbar spine     Nonrheumatic mitral (valve) stenosis     Occlusion and stenosis of bilateral carotid arteries     Peripheral vascular disease, unspecified     Presence of other vascular implants and grafts     Primary insomnia     Raynaud disease 08/03/2022    S/P AAA repair 08/03/2022    Solitary pulmonary nodule     Spinal stenosis of lumbar region with neurogenic claudication     Spinal stenosis, lumbosacral region     Stage 3a chronic kidney disease      Past Surgical History:   Procedure Laterality Date    ABDOMINAL AORTIC ANEURYSM REPAIR  03/13/2020    Surgeon: Dr. Ovalles    AORTOGRAM W/ BLE RUNOFF Bilateral 05/30/2024    Mg Cullen MD    CARDIAC SURGERY  01/30/2014    CABG    COLONOSCOPY  11/12/2021    CARLOS MOHAMUD MD    HERNIA REPAIR Left 03/23/2016    PERIPHERAL ANGIOGRAPHY  12/18/2015    Femoral Popliteal revas w/stent    REPAIR OF HEART VALVE      SUPERFICIAL FEMORAL ARTERY ATHERECTOMY Right 08/09/2024    Dr. Mcguire     Family History   Problem Relation Name Age of Onset    No Known Problems Mother      No Known Problems Father      No Known Problems Sister      No Known Problems Brother       Social History[1]  Current Outpatient Medications   Medication Instructions    acetaminophen (TYLENOL) 650 mg, Oral, Every 6 hours PRN    albuterol (PROVENTIL/VENTOLIN HFA) 90 mcg/actuation inhaler 2 puffs, Inhalation, Every 6 hours PRN    albuterol-ipratropium  (DUO-NEB) 2.5 mg-0.5 mg/3 mL nebulizer solution 3 mLs, Nebulization, Every 4 hours PRN, Rescue    alendronate (FOSAMAX) 70 mg, Oral, Every 7 days, Must be taken with 6-8 oz of water. Do not eat or take other medications for 30 minutes. Do not lay down for 30 minutes after taking.    aspirin (ECOTRIN) 81 mg, Daily    bisacodyL (DULCOLAX) 10 mg, Rectal, Daily PRN    carvediloL (COREG) 3.125 mg, Oral, 2 times daily    donepeziL (ARICEPT) 10 mg, Oral, Nightly    finasteride (PROSCAR) 5 mg, Oral, Daily    levocetirizine (XYZAL) 5 MG tablet 1 tablet, Every evening    multivitamin (THERAGRAN) per tablet 1 tablet, Oral, Daily    nicotine polacrilex 2 mg, Oral, As needed (PRN)    rosuvastatin (CRESTOR) 10 MG tablet     senna-docusate 8.6-50 mg (PERICOLACE) 8.6-50 mg per tablet 1 tablet, Oral, Daily PRN    tamsulosin (FLOMAX) 0.4 mg, Oral, Daily    traZODone (DESYREL) 150 mg, Oral, Nightly    TRUE METRIX GLUCOSE TEST STRIP Strp TEST BLOOD SUGAR EVERY DAY    TRUEPLUS LANCETS 33 gauge Misc TEST BLOOD SUGAR EVERY DAY     Review of patient's allergies indicates:   Allergen Reactions    Nitrofurantoin monohyd/m-cryst        Patient Care Team:  Gabino Pimentel MD as PCP - General (Internal Medicine)  Jagdish Kelly MD as Consulting Physician (Cardiology)  Gabriela Luna MD as Consulting Physician (Vascular Surgery)  Randall Dewey MD as Consulting Physician (Gastroenterology)        No follow-ups on file. In addition to their scheduled follow up, the patient has also been instructed to follow up on as needed basis.     Future Appointments   Date Time Provider Department Center   8/7/2025 10:00 AM Gabino Pimentel MD [facility]             [1]   Social History  Socioeconomic History    Marital status:    Tobacco Use    Smoking status: Former     Current packs/day: 0.00     Average packs/day: 0.9 packs/day for 58.5 years (54.6 ttl pk-yrs)     Types: Cigarettes     Start date: 1966     Quit date:  10/4/2024     Years since quittin.7     Passive exposure: Past    Smokeless tobacco: Never    Tobacco comments:     Quit 10/4/24   Substance and Sexual Activity    Alcohol use: Never    Drug use: Never    Sexual activity: Yes     Social Drivers of Health     Financial Resource Strain: Low Risk  (3/23/2025)    Overall Financial Resource Strain (CARDIA)     Difficulty of Paying Living Expenses: Not hard at all   Food Insecurity: No Food Insecurity (3/23/2025)    Hunger Vital Sign     Worried About Running Out of Food in the Last Year: Never true     Ran Out of Food in the Last Year: Never true   Transportation Needs: No Transportation Needs (2024)    TRANSPORTATION NEEDS     Transportation : No   Physical Activity: Insufficiently Active (2024)    Exercise Vital Sign     Days of Exercise per Week: 3 days     Minutes of Exercise per Session: 30 min   Stress: Stress Concern Present (3/23/2025)    [employer] - Occupational Stress Questionnaire     Feeling of Stress : Rather much   Housing Stability: Low Risk  (3/23/2025)    Housing Stability Vital Sign     Unable to Pay for Housing in the Last Year: No     Homeless in the Last Year: No

## 2025-07-10 ENCOUNTER — OFFICE VISIT (OUTPATIENT)
Dept: VASCULAR SURGERY | Facility: CLINIC | Age: 80
End: 2025-07-10
Attending: SPECIALIST
Payer: MEDICARE

## 2025-07-10 VITALS
DIASTOLIC BLOOD PRESSURE: 74 MMHG | HEART RATE: 71 BPM | SYSTOLIC BLOOD PRESSURE: 123 MMHG | WEIGHT: 145 LBS | HEIGHT: 66 IN | BODY MASS INDEX: 23.3 KG/M2

## 2025-07-10 DIAGNOSIS — Z98.890 S/P AAA REPAIR: ICD-10-CM

## 2025-07-10 DIAGNOSIS — Z86.79 S/P AAA REPAIR: ICD-10-CM

## 2025-07-10 DIAGNOSIS — I71.23 ANEURYSM OF DESCENDING THORACIC AORTA WITHOUT RUPTURE: ICD-10-CM

## 2025-07-10 DIAGNOSIS — I71.43 INFRARENAL ABDOMINAL AORTIC ANEURYSM (AAA) WITHOUT RUPTURE: Primary | ICD-10-CM

## 2025-08-07 ENCOUNTER — OFFICE VISIT (OUTPATIENT)
Dept: PRIMARY CARE CLINIC | Facility: CLINIC | Age: 80
End: 2025-08-07
Payer: MEDICARE

## 2025-08-07 VITALS
DIASTOLIC BLOOD PRESSURE: 72 MMHG | BODY MASS INDEX: 17.92 KG/M2 | SYSTOLIC BLOOD PRESSURE: 137 MMHG | HEART RATE: 63 BPM | OXYGEN SATURATION: 98 % | RESPIRATION RATE: 15 BRPM | WEIGHT: 111 LBS

## 2025-08-07 DIAGNOSIS — I65.1 BASILAR ARTERY STENOSIS: Primary | ICD-10-CM

## 2025-08-07 DIAGNOSIS — R35.0 URINARY FREQUENCY: ICD-10-CM

## 2025-08-07 PROBLEM — N39.0 COMPLICATED UTI (URINARY TRACT INFECTION): Status: RESOLVED | Noted: 2024-08-26 | Resolved: 2025-08-07

## 2025-08-07 LAB
BILIRUB SERPL-MCNC: ABNORMAL MG/DL
BLOOD URINE, POC: ABNORMAL
CLARITY, POC UA: CLEAR
COLOR, POC UA: YELLOW
GLUCOSE UR QL STRIP: ABNORMAL
KETONES UR QL STRIP: ABNORMAL
LEUKOCYTE ESTERASE URINE, POC: ABNORMAL
NITRITE, POC UA: ABNORMAL
PH, POC UA: 6.5
PROTEIN, POC: ABNORMAL
SPECIFIC GRAVITY, POC UA: 1.01
UROBILINOGEN, POC UA: 0.2

## 2025-08-07 PROCEDURE — 1100F PTFALLS ASSESS-DOCD GE2>/YR: CPT | Mod: CPTII,,, | Performed by: STUDENT IN AN ORGANIZED HEALTH CARE EDUCATION/TRAINING PROGRAM

## 2025-08-07 PROCEDURE — 3075F SYST BP GE 130 - 139MM HG: CPT | Mod: CPTII,,, | Performed by: STUDENT IN AN ORGANIZED HEALTH CARE EDUCATION/TRAINING PROGRAM

## 2025-08-07 PROCEDURE — 1159F MED LIST DOCD IN RCRD: CPT | Mod: CPTII,,, | Performed by: STUDENT IN AN ORGANIZED HEALTH CARE EDUCATION/TRAINING PROGRAM

## 2025-08-07 PROCEDURE — 3288F FALL RISK ASSESSMENT DOCD: CPT | Mod: CPTII,,, | Performed by: STUDENT IN AN ORGANIZED HEALTH CARE EDUCATION/TRAINING PROGRAM

## 2025-08-07 PROCEDURE — 81002 URINALYSIS NONAUTO W/O SCOPE: CPT | Mod: ,,, | Performed by: STUDENT IN AN ORGANIZED HEALTH CARE EDUCATION/TRAINING PROGRAM

## 2025-08-07 PROCEDURE — 3078F DIAST BP <80 MM HG: CPT | Mod: CPTII,,, | Performed by: STUDENT IN AN ORGANIZED HEALTH CARE EDUCATION/TRAINING PROGRAM

## 2025-08-07 PROCEDURE — 1160F RVW MEDS BY RX/DR IN RCRD: CPT | Mod: CPTII,,, | Performed by: STUDENT IN AN ORGANIZED HEALTH CARE EDUCATION/TRAINING PROGRAM

## 2025-08-07 PROCEDURE — 99214 OFFICE O/P EST MOD 30 MIN: CPT | Mod: ,,, | Performed by: STUDENT IN AN ORGANIZED HEALTH CARE EDUCATION/TRAINING PROGRAM

## 2025-08-07 NOTE — ASSESSMENT & PLAN NOTE
Chronic issue with progression.    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 Jolley (Interventional Neuro) by Neurosurgery in January but no-showed his appointment. Unfortunately falls, weakness, etc have worsened.     Re-sending referral to Dr. Jolley today.    Explained the serious nature of this issue to the patient. He will keep his phone close at all times and expect a call from Dr. Jolley's office.

## 2025-08-07 NOTE — Clinical Note
Dr. Jolley, patient with 70% basilar artery stenosis, falls, coordination problems, etc. He was supposed to see you in January but no-showed (has some cognitive deficits and poor healthcare literacy). Neurosurgery had referred him to you. Just sending his chart to make you aware. I did restart the referral process to your office.   Thank you.

## 2025-08-07 NOTE — PROGRESS NOTES
Subjective:       Patient ID: Gustavo Lanza is a 79 y.o. male.    -------------------------------------    AAA (abdominal aortic aneurysm)    Abdominal aortic aneurysm, without rupture, unspecified    Acute bilateral low back pain without sciatica    Aneurysm of descending thoracic aorta without rupture    Atherosclerosis of arteries of extremities    Benign prostatic hyperplasia without lower urinary tract symptoms    BPH (benign prostatic hyperplasia)    Carotid artery stenosis    Chronic idiopathic constipation    Compression fracture of L1 vertebra, initial encounter    COPD (chronic obstructive pulmonary disease)    Coronary artery disease involving native coronary artery of native heart without angina pectoris    Disorder of kidney and ureter    Hyperlipidemia, unspecified    Impaired fasting glucose    Leg weakness, bilateral    Mixed hyperlipidemia    Neural foraminal stenosis of lumbar spine    Nonrheumatic mitral (valve) stenosis    Occlusion and stenosis of bilateral carotid arteries    Peripheral vascular disease, unspecified    Presence of other vascular implants and grafts    Primary insomnia    Raynaud disease    S/P AAA repair    Solitary pulmonary nodule    Spinal stenosis of lumbar region with neurogenic claudication    Spinal stenosis, lumbosacral region    Stage 3a chronic kidney disease        History of Present Illness    CHIEF COMPLAINT:  Patient presents today for follow up of recurrent falls.    NEUROLOGICAL SYMPTOMS:  He experiences episodes where he feels like he is going to fall, during which he squats down for approximately 30-60 seconds before returning to normal. He is uncertain if he loses consciousness during these episodes. MRA from January neurosurgery visit showed 70% basilar artery stenosis. He missed scheduled follow-up with Dr. Jolley on January 27th and is willing to reschedule. He currently denies any other specific symptoms related to basilar artery  stenosis.    URINARY SYMPTOMS:  He reports significant urinary frequency with 15-16 episodes daily, particularly pronounced at night. He requires knowledge of bathroom locations everywhere, noting 6-7 bathroom visits during [employer] trips. He had a UTI 5 months ago with shaking and shivering, and reports similar symptoms continue. These symptoms significantly impact his daily activities.    BACK PAIN:  He reports ongoing back pain associated with difficulties in bowel movements and urination.    SLEEP:  He reports ongoing difficulty sleeping, though frequency and severity are not specified.       Review of Systems   Constitutional:  Negative for chills and fever.   HENT:  Negative for sinus pressure/congestion.    Respiratory:  Negative for cough and shortness of breath.    Cardiovascular:  Negative for chest pain.   Gastrointestinal:  Negative for abdominal pain and nausea.   Genitourinary:  Negative for dysuria.   Musculoskeletal:  Positive for back pain. Negative for arthralgias.   Integumentary:  Negative for rash.   Neurological:  Positive for weakness (generalized). Negative for headaches.        Falls   Psychiatric/Behavioral:  The patient is not nervous/anxious.            Objective:      Physical Exam  Vitals and nursing note reviewed.   Constitutional:       Appearance: He is underweight.   HENT:      Head: Normocephalic.      Nose: No rhinorrhea.   Eyes:      General: No scleral icterus.  Cardiovascular:      Rate and Rhythm: Normal rate.   Pulmonary:      Effort: Pulmonary effort is normal.   Musculoskeletal:         General: No deformity or signs of injury.   Skin:     General: Skin is dry.      Coloration: Skin is not jaundiced.   Neurological:      Mental Status: He is alert.      Comments: Kyphotic posture, slow to stand, gait is slow and slightly unsteady   Psychiatric:         Mood and Affect: Mood normal.         Behavior: Behavior normal.           Assessment & Plan:     1. Basilar artery  stenosis  Assessment & Plan:  Chronic issue with progression.    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 Jolley (Interventional Neuro) by Neurosurgery in January but no-showed his appointment. Unfortunately falls, weakness, etc have worsened.     Re-sending referral to Dr. Jolley today.    Explained the serious nature of this issue to the patient. He will keep his phone close at all times and expect a call from Dr. Jolley's office.    Orders:  -     Ambulatory referral/consult to Neurology    2. Urinary frequency  Comments:  UA in office clear today. Probably related to BPH but will not increase Flomax sec to dizziness/falls. See above.  Orders:  -     POCT URINE DIPSTICK WITHOUT MICROSCOPE         Follow up in about 2 months (around 10/7/2025) for Falls. In addition to their scheduled follow up, the patient has also been instructed to follow up on as needed basis.     This note was generated with the assistance of ambient listening technology. Verbal consent was obtained by the patient and accompanying visitor(s) for the recording of patient appointment to facilitate this note. I attest to having reviewed and edited the generated note for accuracy, though some syntax or spelling errors may persist. Please contact the author of this note for any clarification.

## 2025-08-11 ENCOUNTER — TELEPHONE (OUTPATIENT)
Dept: NEUROLOGY | Facility: CLINIC | Age: 80
End: 2025-08-11
Payer: MEDICARE

## 2025-08-13 ENCOUNTER — OFFICE VISIT (OUTPATIENT)
Dept: NEUROLOGY | Facility: CLINIC | Age: 80
End: 2025-08-13
Payer: MEDICARE

## 2025-08-13 VITALS — BODY MASS INDEX: 22.82 KG/M2 | HEIGHT: 66 IN | WEIGHT: 142 LBS

## 2025-08-13 DIAGNOSIS — R29.6 FALLS: ICD-10-CM

## 2025-08-13 DIAGNOSIS — I65.1 BASILAR ARTERY STENOSIS: Primary | ICD-10-CM

## 2025-08-13 PROCEDURE — 99999 PR PBB SHADOW E&M-EST. PATIENT-LVL III: CPT | Mod: PBBFAC,,, | Performed by: NURSE PRACTITIONER

## 2025-08-13 RX ORDER — IBUPROFEN 200 MG
1 TABLET ORAL DAILY
COMMUNITY

## 2025-08-20 ENCOUNTER — HOSPITAL ENCOUNTER (OUTPATIENT)
Dept: INTERVENTIONAL RADIOLOGY/VASCULAR | Facility: HOSPITAL | Age: 80
Discharge: HOME | End: 2025-08-20
Attending: NURSE PRACTITIONER | Admitting: PSYCHIATRY & NEUROLOGY
Payer: MEDICARE

## 2025-08-20 VITALS
DIASTOLIC BLOOD PRESSURE: 81 MMHG | BODY MASS INDEX: 17.75 KG/M2 | TEMPERATURE: 97 F | SYSTOLIC BLOOD PRESSURE: 168 MMHG | OXYGEN SATURATION: 96 % | HEIGHT: 67 IN | HEART RATE: 60 BPM | WEIGHT: 113.13 LBS

## 2025-08-20 DIAGNOSIS — I65.1 BASILAR ARTERY STENOSIS: ICD-10-CM

## 2025-08-20 LAB
ALBUMIN SERPL-MCNC: 3.6 G/DL (ref 3.4–4.8)
ALBUMIN/GLOB SERPL: 1 RATIO (ref 1.1–2)
ALP SERPL-CCNC: 54 UNIT/L (ref 40–150)
ALT SERPL-CCNC: 14 UNIT/L (ref 0–55)
ANION GAP SERPL CALC-SCNC: 7 MEQ/L
AST SERPL-CCNC: 23 UNIT/L (ref 11–45)
BASOPHILS # BLD AUTO: 0.05 X10(3)/MCL
BASOPHILS NFR BLD AUTO: 0.6 %
BILIRUB SERPL-MCNC: 0.5 MG/DL
BUN SERPL-MCNC: 16.1 MG/DL (ref 8.4–25.7)
CALCIUM SERPL-MCNC: 9.2 MG/DL (ref 8.8–10)
CHLORIDE SERPL-SCNC: 105 MMOL/L (ref 98–107)
CO2 SERPL-SCNC: 29 MMOL/L (ref 23–31)
CREAT SERPL-MCNC: 1.01 MG/DL (ref 0.72–1.25)
CREAT/UREA NIT SERPL: 16
EOSINOPHIL # BLD AUTO: 0.16 X10(3)/MCL (ref 0–0.9)
EOSINOPHIL NFR BLD AUTO: 2 %
ERYTHROCYTE [DISTWIDTH] IN BLOOD BY AUTOMATED COUNT: 13.9 % (ref 11.5–17)
GFR SERPLBLD CREATININE-BSD FMLA CKD-EPI: >60 ML/MIN/1.73/M2
GLOBULIN SER-MCNC: 3.6 GM/DL (ref 2.4–3.5)
GLUCOSE SERPL-MCNC: 93 MG/DL (ref 82–115)
HCT VFR BLD AUTO: 43.4 % (ref 42–52)
HGB BLD-MCNC: 14.1 G/DL (ref 14–18)
IMM GRANULOCYTES # BLD AUTO: 0.02 X10(3)/MCL (ref 0–0.04)
IMM GRANULOCYTES NFR BLD AUTO: 0.3 %
INR PPP: 1
LYMPHOCYTES # BLD AUTO: 2.01 X10(3)/MCL (ref 0.6–4.6)
LYMPHOCYTES NFR BLD AUTO: 25.6 %
MCH RBC QN AUTO: 30.2 PG (ref 27–31)
MCHC RBC AUTO-ENTMCNC: 32.5 G/DL (ref 33–36)
MCV RBC AUTO: 92.9 FL (ref 80–94)
MONOCYTES # BLD AUTO: 0.76 X10(3)/MCL (ref 0.1–1.3)
MONOCYTES NFR BLD AUTO: 9.7 %
NEUTROPHILS # BLD AUTO: 4.86 X10(3)/MCL (ref 2.1–9.2)
NEUTROPHILS NFR BLD AUTO: 61.8 %
NRBC BLD AUTO-RTO: 0 %
PLATELET # BLD AUTO: 155 X10(3)/MCL (ref 130–400)
PMV BLD AUTO: 10.4 FL (ref 7.4–10.4)
POCT GLUCOSE: 98 MG/DL (ref 70–110)
POTASSIUM SERPL-SCNC: 4.7 MMOL/L (ref 3.5–5.1)
PROT SERPL-MCNC: 7.2 GM/DL (ref 5.8–7.6)
PROTHROMBIN TIME: 13.4 SECONDS (ref 12.5–14.5)
RBC # BLD AUTO: 4.67 X10(6)/MCL (ref 4.7–6.1)
SODIUM SERPL-SCNC: 141 MMOL/L (ref 136–145)
WBC # BLD AUTO: 7.86 X10(3)/MCL (ref 4.5–11.5)

## 2025-08-20 PROCEDURE — C1887 CATHETER, GUIDING: HCPCS | Performed by: PSYCHIATRY & NEUROLOGY

## 2025-08-20 PROCEDURE — C1894 INTRO/SHEATH, NON-LASER: HCPCS | Performed by: PSYCHIATRY & NEUROLOGY

## 2025-08-20 PROCEDURE — C1769 GUIDE WIRE: HCPCS | Performed by: PSYCHIATRY & NEUROLOGY

## 2025-08-20 PROCEDURE — 85610 PROTHROMBIN TIME: CPT | Performed by: NURSE PRACTITIONER

## 2025-08-20 PROCEDURE — 80053 COMPREHEN METABOLIC PANEL: CPT | Performed by: NURSE PRACTITIONER

## 2025-08-20 PROCEDURE — 27201423 OPTIME MED/SURG SUP & DEVICES STERILE SUPPLY: Performed by: PSYCHIATRY & NEUROLOGY

## 2025-08-20 PROCEDURE — 25500020 PHARM REV CODE 255: Performed by: PSYCHIATRY & NEUROLOGY

## 2025-08-20 PROCEDURE — 99152 MOD SED SAME PHYS/QHP 5/>YRS: CPT | Performed by: PSYCHIATRY & NEUROLOGY

## 2025-08-20 PROCEDURE — 85025 COMPLETE CBC W/AUTO DIFF WBC: CPT | Performed by: NURSE PRACTITIONER

## 2025-08-20 PROCEDURE — 99153 MOD SED SAME PHYS/QHP EA: CPT | Performed by: PSYCHIATRY & NEUROLOGY

## 2025-08-20 PROCEDURE — 63600175 PHARM REV CODE 636 W HCPCS: Performed by: PSYCHIATRY & NEUROLOGY

## 2025-08-20 RX ORDER — ACETAMINOPHEN 325 MG/1
650 TABLET ORAL EVERY 4 HOURS PRN
Status: DISCONTINUED | OUTPATIENT
Start: 2025-08-20 | End: 2025-08-21 | Stop reason: HOSPADM

## 2025-08-20 RX ORDER — MIDAZOLAM HYDROCHLORIDE 2 MG/2ML
INJECTION, SOLUTION INTRAMUSCULAR; INTRAVENOUS
Status: COMPLETED | OUTPATIENT
Start: 2025-08-20 | End: 2025-08-20

## 2025-08-20 RX ORDER — ONDANSETRON 4 MG/1
8 TABLET, ORALLY DISINTEGRATING ORAL EVERY 8 HOURS PRN
Status: DISCONTINUED | OUTPATIENT
Start: 2025-08-20 | End: 2025-08-21 | Stop reason: HOSPADM

## 2025-08-20 RX ORDER — LIDOCAINE HYDROCHLORIDE 20 MG/ML
INJECTION, SOLUTION INFILTRATION; PERINEURAL
Status: COMPLETED | OUTPATIENT
Start: 2025-08-20 | End: 2025-08-20

## 2025-08-20 RX ORDER — FENTANYL CITRATE 50 UG/ML
INJECTION, SOLUTION INTRAMUSCULAR; INTRAVENOUS
Status: COMPLETED | OUTPATIENT
Start: 2025-08-20 | End: 2025-08-20

## 2025-08-20 RX ORDER — IOPAMIDOL 755 MG/ML
60 INJECTION, SOLUTION INTRAVASCULAR
Status: COMPLETED | OUTPATIENT
Start: 2025-08-20 | End: 2025-08-20

## 2025-08-20 RX ADMIN — LIDOCAINE HYDROCHLORIDE 5 ML: 20 INJECTION, SOLUTION INFILTRATION; PERINEURAL at 01:08

## 2025-08-20 RX ADMIN — IOPAMIDOL 60 ML: 755 INJECTION, SOLUTION INTRAVENOUS at 02:08

## 2025-08-20 RX ADMIN — MIDAZOLAM HYDROCHLORIDE 1 MG: 1 INJECTION, SOLUTION INTRAMUSCULAR; INTRAVENOUS at 01:08

## 2025-08-20 RX ADMIN — FENTANYL CITRATE 50 MCG: 50 INJECTION INTRAMUSCULAR; INTRAVENOUS at 01:08
